# Patient Record
Sex: FEMALE | Race: WHITE | NOT HISPANIC OR LATINO | Employment: UNEMPLOYED | URBAN - METROPOLITAN AREA
[De-identification: names, ages, dates, MRNs, and addresses within clinical notes are randomized per-mention and may not be internally consistent; named-entity substitution may affect disease eponyms.]

---

## 2019-02-05 ENCOUNTER — HOSPITAL ENCOUNTER (EMERGENCY)
Facility: HOSPITAL | Age: 44
Discharge: HOME | End: 2019-02-05
Attending: EMERGENCY MEDICINE
Payer: COMMERCIAL

## 2019-02-05 VITALS
DIASTOLIC BLOOD PRESSURE: 70 MMHG | SYSTOLIC BLOOD PRESSURE: 122 MMHG | RESPIRATION RATE: 18 BRPM | OXYGEN SATURATION: 95 % | TEMPERATURE: 99.3 F | HEART RATE: 92 BPM

## 2019-02-05 DIAGNOSIS — Z76.0 ENCOUNTER FOR MEDICATION REFILL: ICD-10-CM

## 2019-02-05 DIAGNOSIS — L02.419 AXILLARY ABSCESS: Primary | ICD-10-CM

## 2019-02-05 PROCEDURE — 99282 EMERGENCY DEPT VISIT SF MDM: CPT

## 2019-02-05 RX ORDER — TRAZODONE HYDROCHLORIDE 50 MG/1
50 TABLET ORAL
Qty: 14 TABLET | Refills: 0 | Status: SHIPPED | OUTPATIENT
Start: 2019-02-05 | End: 2019-03-08 | Stop reason: HOSPADM

## 2019-02-05 RX ORDER — SULFAMETHOXAZOLE AND TRIMETHOPRIM 800; 160 MG/1; MG/1
1 TABLET ORAL 2 TIMES DAILY
Qty: 14 TABLET | Refills: 0 | Status: SHIPPED | OUTPATIENT
Start: 2019-02-05 | End: 2019-02-12

## 2019-02-05 RX ORDER — GABAPENTIN 300 MG/1
300 CAPSULE ORAL 3 TIMES DAILY
Qty: 42 CAPSULE | Refills: 0 | Status: SHIPPED | OUTPATIENT
Start: 2019-02-05 | End: 2019-03-08 | Stop reason: HOSPADM

## 2019-02-05 RX ORDER — CEPHALEXIN 500 MG/1
500 CAPSULE ORAL 4 TIMES DAILY
Qty: 28 CAPSULE | Refills: 0 | Status: SHIPPED | OUTPATIENT
Start: 2019-02-05 | End: 2019-02-12

## 2019-02-05 RX ORDER — ARIPIPRAZOLE 5 MG/1
5 TABLET ORAL 2 TIMES DAILY
Qty: 28 TABLET | Refills: 0 | Status: SHIPPED | OUTPATIENT
Start: 2019-02-05 | End: 2019-02-19

## 2019-02-05 NOTE — ED PROVIDER NOTES
History  Chief Complaint   Patient presents with    Abscess     Pt states that she has abscesses on the right underarm  Pt is concerned for staff     44year old female presenting for evaluation of multiple lumps in the right axilla  Patient states that she 1st noticed a small red bump in the right axilla 4 days ago  She states that she thought that she had an ingrown hair but then noticed to more red lumps appear over next 2 days  She reports she has been using warm compresses with only minimal relief  She states that she has had similar bumps that turned into abscesses on the previously  She denies any fevers chills or sweats  She denies any drainage of the abscesses  No sob, cough or wheezing  In addition patient recently moved here from [place] and is requesting medication refill  Patient denies any SI HI auditory visual hallucinations  None       Past Medical History:   Diagnosis Date    Psychiatric disorder     anxiety, depression, PTSD       History reviewed  No pertinent surgical history  History reviewed  No pertinent family history  I have reviewed and agree with the history as documented  Social History   Substance Use Topics    Smoking status: Current Every Day Smoker    Smokeless tobacco: Never Used    Alcohol use Yes      Comment: SOCIALLY         Review of Systems   All other systems reviewed and are negative  Physical Exam  Physical Exam   Constitutional: She is oriented to person, place, and time  She appears well-developed and well-nourished  No distress  HENT:   Head: Normocephalic and atraumatic  Eyes: Conjunctivae are normal    EOM grossly intact   Neck: Normal range of motion  Neck supple  No JVD present  Cardiovascular: Normal rate  Pulmonary/Chest: Effort normal        Abdominal: Soft     Musculoskeletal:   FROM, steady gait, cap refill brisk, strength and sensation grossly intact throughout   Neurological: She is alert and oriented to person, place, and time  Skin: Skin is warm and dry  Capillary refill takes less than 2 seconds  Psychiatric: She has a normal mood and affect  Her behavior is normal    Nursing note and vitals reviewed  Vital Signs  ED Triage Vitals [02/05/19 1402]   Temperature Pulse Respirations Blood Pressure SpO2   99 3 °F (37 4 °C) 92 18 122/70 95 %      Temp Source Heart Rate Source Patient Position - Orthostatic VS BP Location FiO2 (%)   Oral Monitor Sitting Right arm --      Pain Score       4           Vitals:    02/05/19 1402   BP: 122/70   Pulse: 92   Patient Position - Orthostatic VS: Sitting       Visual Acuity      ED Medications  Medications - No data to display    Diagnostic Studies  Results Reviewed     None                 No orders to display              Procedures  Procedures       Phone Contacts  ED Phone Contact    ED Course                               MDM  Number of Diagnoses or Management Options  Axillary abscess:   Encounter for medication refill:   Diagnosis management comments: 71-year-old female presenting with concern for abscess in the right axilla, patient does have 3 area is less than 0 5 cm non fluctuant, will not be completing incision and drainage at this time, will start patient on Bactrim and Keflex to cover for MRSA, in addition patient is requesting medication refill, will give her 2 weeks wet that she finds a PCP and psych as she is new to the area, otherwise patient appears well nontoxic appearing no acute distress afebrile today, follow up with PCP as needed outpatient for further management    strict return to ED precautions discussed  Pt verbalizes understanding and agrees with plan  Pt is stable for discharge    Portions of the record may have been created with voice recognition software  Occasional wrong word or "sound a like" substitutions may have occurred due to the inherent limitations of voice recognition software   Read the chart carefully and recognize, using context, where substitutions have occurred  Disposition  Final diagnoses:   Axillary abscess   Encounter for medication refill     Time reflects when diagnosis was documented in both MDM as applicable and the Disposition within this note     Time User Action Codes Description Comment    2/5/2019  2:21 PM Ainsley Espinal Add [L02 419] Axillary abscess     2/5/2019  2:21 PM Ainsley Espinal Add [Z76 0] Encounter for medication refill       ED Disposition     ED Disposition Condition Date/Time Comment    Discharge  Tue Feb 5, 2019  2:22 PM Pablo Cunningham discharge to home/self care  Condition at discharge: Good        Follow-up Information     Follow up With Specialties Details Why Contact Info Additional [address] Family Medicine Schedule an appointment as soon as possible for a visit As needed [address]  [facility] [address] [id number]  [address], [address] [facility],[address] [place], [place], [address]          Patient's Medications   Discharge Prescriptions    ARIPIPRAZOLE (ABILIFY) 5 MG TABLET    Take 1 tablet (5 mg total) by mouth 2 (two) times a day for 14 days       Start Date: 2/5/2019  End Date: 2/19/2019       Order Dose: 5 mg       Quantity: 28 tablet    Refills: 0    CEPHALEXIN (KEFLEX) 500 MG CAPSULE    Take 1 capsule (500 mg total) by mouth 4 (four) times a day for 7 days       Start Date: 2/5/2019  End Date: 2/12/2019       Order Dose: 500 mg       Quantity: 28 capsule    Refills: 0    GABAPENTIN (NEURONTIN) 300 MG CAPSULE    Take 1 capsule (300 mg total) by mouth 3 (three) times a day for 14 days For post-herpetic neuralgia: Take 1 tablet on day 1,  Then take 2 tablets on day 2, Then take 3 tablets on day 3 and every day after that as instructed by your doctor         Start Date: 2/5/2019  End Date: 2/19/2019       Order Dose: 300 mg       Quantity: 42 capsule    Refills: 0    SULFAMETHOXAZOLE-TRIMETHOPRIM (BACTRIM DS) 800-160 MG PER TABLET    Take 1 tablet by mouth 2 (two) times a day for 7 days smx-tmp DS (BACTRIM) 800-160 mg tabs (1tab q12 D10)       Start Date: 2/5/2019  End Date: 2/12/2019       Order Dose: 1 tablet       Quantity: 14 tablet    Refills: 0    TRAZODONE (DESYREL) 50 MG TABLET    Take 1 tablet (50 mg total) by mouth daily at bedtime for 14 days       Start Date: 2/5/2019  End Date: 2/19/2019       Order Dose: 50 mg       Quantity: 14 tablet    Refills: 0     No discharge procedures on file      ED Provider  Electronically Signed by           Migdalia Michelle PA-C  02/05/19 1814

## 2019-02-05 NOTE — DISCHARGE INSTRUCTIONS
Abscess   WHAT YOU NEED TO KNOW:   A warm compress may help your abscess drain  Your healthcare provider may make a cut in the abscess so it can drain  You may need surgery to remove an abscess that is on your hands or buttocks  DISCHARGE INSTRUCTIONS:   Return to the emergency department if:   · The area around your abscess becomes very painful, warm, or has red streaks  · You have a fever and chills  · Your heart is beating faster than usual      · You feel faint or confused  Contact your healthcare provider if:   · Your abscess gets bigger or does not get better  · Your abscess returns  · You have questions or concerns about your condition or care  Medicines: You may  need any of the following:  · Antibiotics  help treat a bacterial infection  · Acetaminophen  decreases pain and fever  It is available without a doctor's order  Ask how much to take and how often to take it  Follow directions  Acetaminophen can cause liver damage if not taken correctly  · NSAIDs , such as ibuprofen, help decrease swelling, pain, and fever  This medicine is available with or without a doctor's order  NSAIDs can cause stomach bleeding or kidney problems in certain people  If you take blood thinner medicine, always ask your healthcare provider if NSAIDs are safe for you  Always read the medicine label and follow directions  · Take your medicine as directed  Contact your healthcare provider if you think your medicine is not helping or if you have side effects  Tell him or her if you are allergic to any medicine  Keep a list of the medicines, vitamins, and herbs you take  Include the amounts, and when and why you take them  Bring the list or the pill bottles to follow-up visits  Carry your medicine list with you in case of an emergency  Self-care:   · Apply a warm compress to your abscess  This will help it open and drain  Wet a washcloth in warm, but not hot, water  Apply the compress for 10 minutes  Repeat this 4 times each day  Do not  press on an abscess or try to open it with a needle  You may push the bacteria deeper or into your blood  · Do not share your clothes, towels, or sheets with anyone  This can spread the infection to others  · Wash your hands often  This can help prevent the spread of germs  Use soap and water or an alcohol-based hand rub  Care for your wound after it is drained:   · Care for your wound as directed  If your healthcare provider says it is okay, carefully remove the bandage and gauze packing  You may need to soak the gauze to get it out of your wound  Clean your wound and the area around it as directed  Dry the area and put on new, clean bandages  Change your bandages when they get wet or dirty  · Ask your healthcare provider how to change the gauze in your wound  Keep track of how many pieces of gauze are placed inside the wound  Do not put too much packing in the wound  Do not pack the gauze too tightly in your wound  Follow up with your healthcare provider in 1 to 3 days: You may need to have your packing removed or your bandage changed  Write down your questions so you remember to ask them during your visits  © 2017 [address]  Information is for End User's use only and may not be sold, redistributed or otherwise used for commercial purposes  All illustrations and images included in CareNotes® are the copyrighted property of [employer] , [employer]  or Raudel Oliveira  The above information is an  only  It is not intended as medical advice for individual conditions or treatments  Talk to your doctor, nurse or pharmacist before following any medical regimen to see if it is safe and effective for you

## 2019-02-19 ENCOUNTER — HOSPITAL ENCOUNTER (INPATIENT)
Facility: HOSPITAL | Age: 44
LOS: 17 days | Discharge: HOME | DRG: 885 | End: 2019-03-08
Attending: HOSPITALIST | Admitting: HOSPITALIST
Payer: COMMERCIAL

## 2019-02-19 ENCOUNTER — HOSPITAL ENCOUNTER (EMERGENCY)
Facility: HOSPITAL | Age: 44
End: 2019-02-19
Attending: EMERGENCY MEDICINE
Payer: COMMERCIAL

## 2019-02-19 VITALS
WEIGHT: 170 LBS | RESPIRATION RATE: 18 BRPM | SYSTOLIC BLOOD PRESSURE: 115 MMHG | BODY MASS INDEX: 31.28 KG/M2 | DIASTOLIC BLOOD PRESSURE: 69 MMHG | OXYGEN SATURATION: 97 % | HEIGHT: 62 IN | TEMPERATURE: 97.9 F | HEART RATE: 75 BPM

## 2019-02-19 DIAGNOSIS — Z00.00 PHYSICAL EXAM: ICD-10-CM

## 2019-02-19 DIAGNOSIS — R45.851 SUICIDAL IDEATIONS: ICD-10-CM

## 2019-02-19 DIAGNOSIS — F31.81 BIPOLAR II DISORDER (HCC): Primary | ICD-10-CM

## 2019-02-19 DIAGNOSIS — Z00.00 PHYSICAL EXAM: Primary | ICD-10-CM

## 2019-02-19 LAB
ALBUMIN SERPL BCP-MCNC: 4.3 G/DL (ref 3.5–5.7)
ALP SERPL-CCNC: 85 U/L (ref 40–150)
ALT SERPL W P-5'-P-CCNC: 11 U/L (ref 7–52)
AMPHETAMINES SERPL QL SCN: NEGATIVE
ANION GAP SERPL CALCULATED.3IONS-SCNC: 10 MMOL/L (ref 4–13)
AST SERPL W P-5'-P-CCNC: 11 U/L (ref 13–39)
BARBITURATES UR QL: NEGATIVE
BASOPHILS # BLD AUTO: 0.1 THOUSANDS/ΜL (ref 0–0.1)
BASOPHILS NFR BLD AUTO: 1 % (ref 0–1)
BENZODIAZ UR QL: NEGATIVE
BILIRUB SERPL-MCNC: 0.2 MG/DL (ref 0.2–1)
BUN SERPL-MCNC: 13 MG/DL (ref 7–25)
CALCIUM SERPL-MCNC: 9.3 MG/DL (ref 8.6–10.5)
CHLORIDE SERPL-SCNC: 104 MMOL/L (ref 98–107)
CO2 SERPL-SCNC: 23 MMOL/L (ref 21–31)
COCAINE UR QL: NEGATIVE
CREAT SERPL-MCNC: 0.71 MG/DL (ref 0.6–1.2)
EOSINOPHIL # BLD AUTO: 0.4 THOUSAND/ΜL (ref 0–0.61)
EOSINOPHIL NFR BLD AUTO: 5 % (ref 0–6)
ERYTHROCYTE [DISTWIDTH] IN BLOOD BY AUTOMATED COUNT: 14.1 % (ref 11.6–15.1)
ETHANOL SERPL-MCNC: <10 MG/DL
EXT PREG TEST URINE: NEGATIVE
GFR SERPL CREATININE-BSD FRML MDRD: 105 ML/MIN/1.73SQ M
GLUCOSE SERPL-MCNC: 125 MG/DL (ref 65–140)
HCT VFR BLD AUTO: 40.9 % (ref 37–47)
HGB BLD-MCNC: 13.7 G/DL (ref 11.5–15.4)
LYMPHOCYTES # BLD AUTO: 3 THOUSANDS/ΜL (ref 0.6–4.47)
LYMPHOCYTES NFR BLD AUTO: 32 % (ref 14–44)
MCH RBC QN AUTO: 30.9 PG (ref 26.8–34.3)
MCHC RBC AUTO-ENTMCNC: 33.6 G/DL (ref 31.4–37.4)
MCV RBC AUTO: 92 FL (ref 82–98)
METHADONE UR QL: NEGATIVE
MONOCYTES # BLD AUTO: 0.6 THOUSAND/ΜL (ref 0.17–1.22)
MONOCYTES NFR BLD AUTO: 6 % (ref 4–12)
NEUTROPHILS # BLD AUTO: 5.3 THOUSANDS/ΜL (ref 1.85–7.62)
NEUTS SEG NFR BLD AUTO: 56 % (ref 43–75)
NRBC BLD AUTO-RTO: 0 /100 WBCS
OPIATES UR QL SCN: NEGATIVE
PCP UR QL: NEGATIVE
PLATELET # BLD AUTO: 373 THOUSANDS/UL (ref 149–390)
PMV BLD AUTO: 7.2 FL (ref 8.9–12.7)
POTASSIUM SERPL-SCNC: 4.3 MMOL/L (ref 3.5–5.5)
PROT SERPL-MCNC: 6.9 G/DL (ref 6.4–8.9)
RBC # BLD AUTO: 4.44 MILLION/UL (ref 3.81–5.12)
SODIUM SERPL-SCNC: 137 MMOL/L (ref 134–143)
THC UR QL: NEGATIVE
TSH SERPL DL<=0.05 MIU/L-ACNC: 3.23 UIU/ML (ref 0.45–5.33)
WBC # BLD AUTO: 9.3 THOUSAND/UL (ref 4.31–10.16)

## 2019-02-19 PROCEDURE — 80320 DRUG SCREEN QUANTALCOHOLS: CPT | Performed by: EMERGENCY MEDICINE

## 2019-02-19 PROCEDURE — 85025 COMPLETE CBC W/AUTO DIFF WBC: CPT | Performed by: EMERGENCY MEDICINE

## 2019-02-19 PROCEDURE — 80053 COMPREHEN METABOLIC PANEL: CPT | Performed by: EMERGENCY MEDICINE

## 2019-02-19 PROCEDURE — 99285 EMERGENCY DEPT VISIT HI MDM: CPT

## 2019-02-19 PROCEDURE — 36415 COLL VENOUS BLD VENIPUNCTURE: CPT | Performed by: EMERGENCY MEDICINE

## 2019-02-19 PROCEDURE — 80307 DRUG TEST PRSMV CHEM ANLYZR: CPT | Performed by: EMERGENCY MEDICINE

## 2019-02-19 PROCEDURE — 84443 ASSAY THYROID STIM HORMONE: CPT | Performed by: EMERGENCY MEDICINE

## 2019-02-19 PROCEDURE — 81025 URINE PREGNANCY TEST: CPT | Performed by: EMERGENCY MEDICINE

## 2019-02-19 RX ORDER — HALOPERIDOL 5 MG
5 TABLET ORAL EVERY 6 HOURS PRN
Status: DISCONTINUED | OUTPATIENT
Start: 2019-02-19 | End: 2019-03-08 | Stop reason: HOSPADM

## 2019-02-19 RX ORDER — ACETAMINOPHEN 325 MG/1
650 TABLET ORAL ONCE
Status: COMPLETED | OUTPATIENT
Start: 2019-02-19 | End: 2019-02-19

## 2019-02-19 RX ORDER — NICOTINE 21 MG/24HR
1 PATCH, TRANSDERMAL 24 HOURS TRANSDERMAL DAILY
Status: CANCELLED | OUTPATIENT
Start: 2019-02-19

## 2019-02-19 RX ORDER — LORAZEPAM 2 MG/ML
2 INJECTION INTRAMUSCULAR EVERY 6 HOURS PRN
Status: CANCELLED | OUTPATIENT
Start: 2019-02-19

## 2019-02-19 RX ORDER — ARIPIPRAZOLE 10 MG/1
10 TABLET ORAL DAILY
COMMUNITY
End: 2019-03-08 | Stop reason: HOSPADM

## 2019-02-19 RX ORDER — ACETAMINOPHEN 325 MG/1
975 TABLET ORAL EVERY 6 HOURS PRN
Status: CANCELLED | OUTPATIENT
Start: 2019-02-19

## 2019-02-19 RX ORDER — LORAZEPAM 2 MG/ML
2 INJECTION INTRAMUSCULAR EVERY 6 HOURS PRN
Status: DISCONTINUED | OUTPATIENT
Start: 2019-02-19 | End: 2019-02-25

## 2019-02-19 RX ORDER — ACETAMINOPHEN 325 MG/1
975 TABLET ORAL EVERY 6 HOURS PRN
Status: DISCONTINUED | OUTPATIENT
Start: 2019-02-19 | End: 2019-03-08 | Stop reason: HOSPADM

## 2019-02-19 RX ORDER — HYDROXYZINE HYDROCHLORIDE 25 MG/1
50 TABLET, FILM COATED ORAL EVERY 6 HOURS PRN
Status: CANCELLED | OUTPATIENT
Start: 2019-02-19

## 2019-02-19 RX ORDER — BENZTROPINE MESYLATE 1 MG/ML
1 INJECTION INTRAMUSCULAR; INTRAVENOUS EVERY 6 HOURS PRN
Status: DISCONTINUED | OUTPATIENT
Start: 2019-02-19 | End: 2019-02-25

## 2019-02-19 RX ORDER — NICOTINE 21 MG/24HR
1 PATCH, TRANSDERMAL 24 HOURS TRANSDERMAL DAILY
Status: DISCONTINUED | OUTPATIENT
Start: 2019-02-20 | End: 2019-02-26

## 2019-02-19 RX ORDER — RISPERIDONE 1 MG/1
1 TABLET, ORALLY DISINTEGRATING ORAL
Status: DISCONTINUED | OUTPATIENT
Start: 2019-02-19 | End: 2019-03-08 | Stop reason: HOSPADM

## 2019-02-19 RX ORDER — BENZTROPINE MESYLATE 1 MG/ML
1 INJECTION INTRAMUSCULAR; INTRAVENOUS EVERY 6 HOURS PRN
Status: CANCELLED | OUTPATIENT
Start: 2019-02-19

## 2019-02-19 RX ORDER — HALOPERIDOL 5 MG/ML
5 INJECTION INTRAMUSCULAR EVERY 6 HOURS PRN
Status: DISCONTINUED | OUTPATIENT
Start: 2019-02-19 | End: 2019-02-25

## 2019-02-19 RX ORDER — RISPERIDONE 0.5 MG/1
1 TABLET, ORALLY DISINTEGRATING ORAL
Status: CANCELLED | OUTPATIENT
Start: 2019-02-19

## 2019-02-19 RX ORDER — ACETAMINOPHEN 325 MG/1
650 TABLET ORAL EVERY 4 HOURS PRN
Status: CANCELLED | OUTPATIENT
Start: 2019-02-19

## 2019-02-19 RX ORDER — HYDROXYZINE HYDROCHLORIDE 25 MG/1
50 TABLET, FILM COATED ORAL EVERY 6 HOURS PRN
Status: DISCONTINUED | OUTPATIENT
Start: 2019-02-19 | End: 2019-02-25

## 2019-02-19 RX ORDER — BENZTROPINE MESYLATE 1 MG/1
1 TABLET ORAL EVERY 6 HOURS PRN
Status: DISCONTINUED | OUTPATIENT
Start: 2019-02-19 | End: 2019-03-08 | Stop reason: HOSPADM

## 2019-02-19 RX ORDER — ACETAMINOPHEN 325 MG/1
650 TABLET ORAL EVERY 4 HOURS PRN
Status: DISCONTINUED | OUTPATIENT
Start: 2019-02-19 | End: 2019-03-08 | Stop reason: HOSPADM

## 2019-02-19 RX ORDER — TRAZODONE HYDROCHLORIDE 50 MG/1
50 TABLET ORAL
Status: CANCELLED | OUTPATIENT
Start: 2019-02-19

## 2019-02-19 RX ORDER — TRAZODONE HYDROCHLORIDE 50 MG/1
50 TABLET ORAL
Status: DISCONTINUED | OUTPATIENT
Start: 2019-02-19 | End: 2019-02-20

## 2019-02-19 RX ORDER — MAGNESIUM HYDROXIDE/ALUMINUM HYDROXICE/SIMETHICONE 120; 1200; 1200 MG/30ML; MG/30ML; MG/30ML
30 SUSPENSION ORAL EVERY 4 HOURS PRN
Status: CANCELLED | OUTPATIENT
Start: 2019-02-19

## 2019-02-19 RX ORDER — HALOPERIDOL 5 MG/ML
5 INJECTION INTRAMUSCULAR EVERY 6 HOURS PRN
Status: CANCELLED | OUTPATIENT
Start: 2019-02-19

## 2019-02-19 RX ORDER — BENZTROPINE MESYLATE 0.5 MG/1
1 TABLET ORAL EVERY 6 HOURS PRN
Status: CANCELLED | OUTPATIENT
Start: 2019-02-19

## 2019-02-19 RX ORDER — ACETAMINOPHEN 325 MG/1
650 TABLET ORAL EVERY 6 HOURS PRN
Status: DISCONTINUED | OUTPATIENT
Start: 2019-02-19 | End: 2019-03-08 | Stop reason: HOSPADM

## 2019-02-19 RX ORDER — HALOPERIDOL 5 MG
5 TABLET ORAL EVERY 6 HOURS PRN
Status: CANCELLED | OUTPATIENT
Start: 2019-02-19

## 2019-02-19 RX ORDER — ACETAMINOPHEN 325 MG/1
650 TABLET ORAL EVERY 6 HOURS PRN
Status: CANCELLED | OUTPATIENT
Start: 2019-02-19

## 2019-02-19 RX ORDER — MAGNESIUM HYDROXIDE/ALUMINUM HYDROXICE/SIMETHICONE 120; 1200; 1200 MG/30ML; MG/30ML; MG/30ML
30 SUSPENSION ORAL EVERY 4 HOURS PRN
Status: DISCONTINUED | OUTPATIENT
Start: 2019-02-19 | End: 2019-03-08 | Stop reason: HOSPADM

## 2019-02-19 RX ADMIN — RISPERIDONE 1 MG: 1 TABLET, ORALLY DISINTEGRATING ORAL at 20:12

## 2019-02-19 RX ADMIN — ACETAMINOPHEN 650 MG: 325 TABLET ORAL at 14:16

## 2019-02-19 RX ADMIN — HYDROXYZINE HYDROCHLORIDE 50 MG: 25 TABLET ORAL at 20:12

## 2019-02-19 NOTE — ED NOTES
Call placed to [facility] at [phone]  Patient is currently fee for service    Patient believes that she had insurance, call placed to [facility]  Spoke with Prasanna Blum who stated patient insurance termed with [facility] on Jan 31, 2019 and will be active with [address] [place] [address] on March 1, 2019

## 2019-02-19 NOTE — ED PROVIDER NOTES
History  Chief Complaint   Patient presents with    Psychiatric Evaluation     Patient reports SI with no plan, has been about 1 week but worse with SI thoughts in last 3 days  Patient 71-year-old female with a psychiatric history was been off her medications for approximately 2 weeks  Patient reports that since she has been off her medication she has been feeling suicidal   Patient reports having thoughts of suicide but no plan  Patient states she does not feel she would go through with a suicide attempt but it concerned her that these thoughts keep coming in her mind  She denies any difficulty sleeping or eating  Patient denies any alcohol or drug use  Prior to Admission Medications   Prescriptions Last Dose Informant Patient Reported? Taking? ARIPiprazole (ABILIFY) 10 mg tablet   Yes Yes   Sig: Take 10 mg by mouth daily   DULoxetine HCl (CYMBALTA PO)   Yes Yes   Sig: Take by mouth daily Patient unsure of dosage  gabapentin (NEURONTIN) 300 mg capsule   No Yes   Sig: Take 1 capsule (300 mg total) by mouth 3 (three) times a day for 14 days For post-herpetic neuralgia: Take 1 tablet on day 1,  Then take 2 tablets on day 2, Then take 3 tablets on day 3 and every day after that as instructed by your doctor  traZODone (DESYREL) 50 mg tablet   No Yes   Sig: Take 1 tablet (50 mg total) by mouth daily at bedtime for 14 days      Facility-Administered Medications: None       Past Medical History:   Diagnosis Date    Anxiety     Major depressive disorder     Psychiatric disorder     anxiety, depression, PTSD    PTSD (post-traumatic stress disorder)        History reviewed  No pertinent surgical history  History reviewed  No pertinent family history  I have reviewed and agree with the history as documented      Social History     Tobacco Use    Smoking status: Current Every Day Smoker     Packs/day: 0 50     Types: Cigarettes    Smokeless tobacco: Never Used   Substance Use Topics    Alcohol use: Yes     Comment: SOCIALLY     Drug use: No        Review of Systems   Constitutional: Negative  HENT: Negative  Respiratory: Negative  Cardiovascular: Negative  Gastrointestinal: Negative  Musculoskeletal: Negative  Skin: Negative  Psychiatric/Behavioral: Positive for dysphoric mood and suicidal ideas  Negative for behavioral problems, confusion and hallucinations  All other systems reviewed and are negative  Physical Exam  Physical Exam   Constitutional: She is oriented to person, place, and time  She appears well-developed and well-nourished  HENT:   Head: Normocephalic and atraumatic  Eyes: Pupils are equal, round, and reactive to light  EOM are normal    Cardiovascular: Normal rate and regular rhythm  Pulmonary/Chest: Effort normal and breath sounds normal    Neurological: She is alert and oriented to person, place, and time  Skin: Skin is dry  Capillary refill takes less than 2 seconds  Psychiatric: She has a normal mood and affect  Her behavior is normal    Nursing note and vitals reviewed        Vital Signs  ED Triage Vitals [02/19/19 1037]   Temperature Pulse Respirations Blood Pressure SpO2   (!) 97 1 °F (36 2 °C) 90 18 118/61 98 %      Temp Source Heart Rate Source Patient Position - Orthostatic VS BP Location FiO2 (%)   Tympanic Monitor Sitting Left arm --      Pain Score       No Pain           Vitals:    02/19/19 1037   BP: 118/61   Pulse: 90   Patient Position - Orthostatic VS: Sitting       Visual Acuity      ED Medications  Medications - No data to display    Diagnostic Studies  Results Reviewed     Procedure Component Value Units Date/Time    TSH [[id number]]  (Normal) Collected:  02/19/19 1047    Lab Status:  Final result Specimen:  Blood from Arm, Left Updated:  02/19/19 1154     TSH 3RD GENERATON 3 230 uIU/mL     Narrative:       Patients undergoing fluorescein dye angiography may retain small amounts of fluorescein in the body for 48-72 hours post procedure  Samples containing fluorescein can produce falsely depressed TSH values  If the patient had this procedure,a specimen should be resubmitted post fluorescein clearance  Comprehensive metabolic panel [[id number]]  (Abnormal) Collected:  02/19/19 1047    Lab Status:  Final result Specimen:  Blood from Arm, Left Updated:  02/19/19 1139     Sodium 137 mmol/L      Potassium 4 3 mmol/L      Chloride 104 mmol/L      CO2 23 mmol/L      ANION GAP 10 mmol/L      BUN 13 mg/dL      Creatinine 0 71 mg/dL      Glucose 125 mg/dL      Calcium 9 3 mg/dL      AST 11 U/L      ALT 11 U/L      Alkaline Phosphatase 85 U/L      Total Protein 6 9 g/dL      Albumin 4 3 g/dL      Total Bilirubin 0 20 mg/dL      eGFR 105 ml/min/1 73sq m     Narrative:       National Kidney Disease Education Program recommendations are as follows:  GFR calculation is accurate only with a steady state creatinine  Chronic Kidney disease less than 60 ml/min/1 73 sq  meters  Kidney failure less than 15 ml/min/1 73 sq  meters  Ethanol [[id number]]  (Normal) Collected:  02/19/19 1047    Lab Status:  Final result Specimen:  Blood from Arm, Left Updated:  02/19/19 1139     Ethanol Lvl <10 mg/dL     Rapid drug screen, urine [[id number]]  (Normal) Collected:  02/19/19 1100    Lab Status:  Final result Specimen:  Urine, Clean Catch Updated:  02/19/19 1123     Amph/Meth UR Negative     Barbiturate Ur Negative     Benzodiazepine Urine Negative     Cocaine Urine Negative     Methadone Urine Negative     Opiate Urine Negative     PCP Ur Negative     THC Urine Negative    Narrative:       FOR MEDICAL PURPOSES ONLY  IF CONFIRMATION NEEDED PLEASE CONTACT THE LAB WITHIN 5 DAYS    Drug Screen Cutoff Levels:  AMPHETAMINE/METHAMPHETAMINES  1000 ng/mL  BARBITURATES     200 ng/mL  BENZODIAZEPINES     200 ng/mL  COCAINE      300 ng/mL  METHADONE      300 ng/mL  OPIATES      300 ng/mL  PHENCYCLIDINE     25 ng/mL  THC       50 ng/mL    CBC and differential [[id number]] (Abnormal) Collected:  02/19/19 1047    Lab Status:  Final result Specimen:  Blood from Arm, Left Updated:  02/19/19 1109     WBC 9 30 Thousand/uL      RBC 4 44 Million/uL      Hemoglobin 13 7 g/dL      Hematocrit 40 9 %      MCV 92 fL      MCH 30 9 pg      MCHC 33 6 g/dL      RDW 14 1 %      MPV 7 2 fL      Platelets 688 Thousands/uL      nRBC 0 /100 WBCs      Neutrophils Relative 56 %      Lymphocytes Relative 32 %      Monocytes Relative 6 %      Eosinophils Relative 5 %      Basophils Relative 1 %      Neutrophils Absolute 5 30 Thousands/µL      Lymphocytes Absolute 3 00 Thousands/µL      Monocytes Absolute 0 60 Thousand/µL      Eosinophils Absolute 0 40 Thousand/µL      Basophils Absolute 0 10 Thousands/µL     POCT pregnancy, urine [[id number]]  (Normal) Resulted:  02/19/19 1100    Lab Status:  Final result Updated:  02/19/19 1100     EXT PREG TEST UR (Ref: Negative) negative                 No orders to display              Procedures  Procedures       Phone Contacts  ED Phone Contact    ED Course      patient is medically cleared for psychiatric evaluation and inpatient psychiatric treatment                          MDM    Disposition  Final diagnoses:   None     ED Disposition     None      MD Documentation      Most Recent Value   Sending MD Dr Mariela Mccann    None         Patient's Medications   Discharge Prescriptions    No medications on file     No discharge procedures on file      ED Provider  Electronically Signed by           Yasmin Lima MD  02/19/19 1635

## 2019-02-19 NOTE — ED NOTES
Pt states that she is having discomfort in her chest but she does not want to refer to it as chest pains  Nurse aware at this time       Latasha Merritt  02/19/19 2764

## 2019-02-19 NOTE — LETTER
[address]  [address] [place] [address]  [phone]  Dept: [address]  CONSENT    NAME Aubry Phoenix                                         1975                              MRN [id number]    I have been informed of my rights regarding examination, treatment, and transfer   by Dr Gui Jaramillo MD    Benefits: Specialized equipment and/or services available at the receiving facility (Include comment)________________________    Risks: Potential for delay in receiving treatment      Transfer Request   I acknowledge that my medical condition has been evaluated and explained to me by the emergency department physician or other qualified medical person and/or my attending physician who has recommended and offered to me further medical examination and treatment  I understand the Hospital's obligation with respect to the treatment and stabilization of my emergency medical condition  I nevertheless request to be transferred  I release the Hospital, the doctor, and any other persons caring for me from all responsibility or liability for any injury or ill effects that may result from my transfer and agree to accept all responsibility for the consequences of my choice to transfer, rather than receive stabilizing treatment at the Hospital  I understand that because the transfer is my request, my insurance may not provide reimbursement for the services  The Hospital will assist and direct me and my family in how to make arrangements for transfer, but the hospital is not liable for any fees charged by the transport service  In spite of this understanding, I refuse to consent to further medical examination and treatment which has been offered to me, and request transfer to [address] [place] [address] Name, [facility] : Jesse Steele   I authorize the performance of emergency medical procedures and treatments upon me in both transit and upon arrival at the receiving facility  Additionally, I authorize the release of any and all medical records to the receiving facility and request they be transported with me, if possible  I authorize the performance of emergency medical procedures and treatments upon me in both transit and upon arrival at the receiving facility  Additionally, I authorize the release of any and all medical records to the receiving facility and request they be transported with me, if possible  I understand that the safest mode of transportation during a medical emergency is an ambulance and that the Hospital advocates the use of this mode of transport  Risks of traveling to the receiving facility by car, including absence of medical control, life sustaining equipment, such as oxygen, and medical personnel has been explained to me and I fully understand them  (JASVIR CORRECT BOX BELOW)  [  ]  I consent to the stated transfer and to be transported by ambulance/helicopter  [  ]  I consent to the stated transfer, but refuse transportation by ambulance and accept full responsibility for my transportation by car  I understand the risks of non-ambulance transfers and I exonerate the Hospital and its staff from any deterioration in my condition that results from this refusal     X___________________________________________    DATE  19  TIME________  Signature of patient or legally responsible individual signing on patient behalf           RELATIONSHIP TO PATIENT_________________________          Provider Certification    NAME Yaya Ann                                        [facility] 1975                              MRN [id number]    A medical screening exam was performed on the above named patient    Based on the examination:    Condition Necessitating Transfer The encounter diagnosis was Physical exam     Patient Condition: The patient has been stabilized such that within reasonable medical probability, no material deterioration of the patient condition or the condition of the unborn child(mariana) is likely to result from the transfer    Reason for Transfer: Level of Care needed not available at this facility    Transfer Requirements: [address]   · Space available and qualified personnel available for treatment as acknowledged by Thomas Olea MA  · Agreed to accept transfer and to provide appropriate medical treatment as acknowledged by       Tita Whitman  · Appropriate medical records of the examination and treatment of the patient are provided at the time of transfer   [address] [facility], [address] _______  · Transfer will be performed by qualified personnel from [address] [place] [address]   and appropriate transfer equipment as required, including the use of necessary and appropriate life support measures  Provider Certification: I have examined the patient and explained the following risks and benefits of being transferred/refusing transfer to the patient/family:  General risk, such as traffic hazards, adverse weather conditions, rough terrain or turbulence, possible failure of equipment (including vehicle or aircraft), or consequences of actions of persons outside the control of the transport personnel      Based on these reasonable risks and benefits to the patient and/or the unborn child(mariana), and based upon the information available at the time of the patients examination, I certify that the medical benefits reasonably to be expected from the provision of appropriate medical treatments at another medical facility outweigh the increasing risks, if any, to the individuals medical condition, and in the case of labor to the unborn child, from effecting the transfer      X____________________________________________ DATE 02/19/19        TIME_______      ORIGINAL - SEND TO MEDICAL RECORDS   COPY - SEND WITH PATIENT DURING TRANSFER

## 2019-02-19 NOTE — ED NOTES
Patient is accepted at [facility]  Patient is accepted by Biju thomason [facility] - SANTIAGO ASHER  [facility]          Nurse report is to be called to [phone] prior to patient transfer

## 2019-02-19 NOTE — EMTALA/ACUTE CARE TRANSFER
[facility] 1076  [address] [place] [address]  [phone]  Dept: [address] [place] [address] CONSENT    NAME Lesley Lucero                                         1975                              MRN [id number]    I have been informed of my rights regarding examination, treatment, and transfer   by Dr Jeff Graham MD    Benefits: Specialized equipment and/or services available at the receiving facility (Include comment)________________________specialized inpatient care    Risks: Potential for delay in receiving treatment  Transport related    Transfer Request   I acknowledge that my medical condition has been evaluated and explained to me by the emergency department physician or other qualified medical person and/or my attending physician who has recommended and offered to me further medical examination and treatment  I understand the Hospital's obligation with respect to the treatment and stabilization of my emergency medical condition  I nevertheless request to be transferred  I release the Hospital, the doctor, and any other persons caring for me from all responsibility or liability for any injury or ill effects that may result from my transfer and agree to accept all responsibility for the consequences of my choice to transfer, rather than receive stabilizing treatment at the Hospital  I understand that because the transfer is my request, my insurance may not provide reimbursement for the services  The Hospital will assist and direct me and my family in how to make arrangements for transfer, but the hospital is not liable for any fees charged by the transport service  In spite of this understanding, I refuse to consent to further medical examination and treatment which has been offered to me, and request transfer to [address] [place] [address] Name, [facility] : Prudence Nobles   I authorize the performance of emergency medical procedures and treatments upon me in both transit and upon arrival at the receiving facility  Additionally, I authorize the release of any and all medical records to the receiving facility and request they be transported with me, if possible  I authorize the performance of emergency medical procedures and treatments upon me in both transit and upon arrival at the receiving facility  Additionally, I authorize the release of any and all medical records to the receiving facility and request they be transported with me, if possible  I understand that the safest mode of transportation during a medical emergency is an ambulance and that the Hospital advocates the use of this mode of transport  Risks of traveling to the receiving facility by car, including absence of medical control, life sustaining equipment, such as oxygen, and medical personnel has been explained to me and I fully understand them  (JASVIR CORRECT BOX BELOW)  [  ]  I consent to the stated transfer and to be transported by ambulance/helicopter  [  ]  I consent to the stated transfer, but refuse transportation by ambulance and accept full responsibility for my transportation by car  I understand the risks of non-ambulance transfers and I exonerate the Hospital and its staff from any deterioration in my condition that results from this refusal     X___________________________________________    DATE  19  TIME________  Signature of patient or legally responsible individual signing on patient behalf           RELATIONSHIP TO PATIENT_________________________          Provider Certification    NAME Ted Salas                                         1975                              MRN [id number]    A medical screening exam was performed on the above named patient    Based on the examination:    Condition Necessitating Transfer The primary encounter diagnosis was Physical exam  A diagnosis of Suicidal ideations was also pertinent to this visit  Patient Condition: The patient has been stabilized such that within reasonable medical probability, no material deterioration of the patient condition or the condition of the unborn child(mariana) is likely to result from the transfer    Reason for Transfer: Level of Care needed not available at this facility    Transfer Requirements: [address]   · Space available and qualified personnel available for treatment as acknowledged by Paula Allen MA  · Agreed to accept transfer and to provide appropriate medical treatment as acknowledged by       Barbi Howell  · Appropriate medical records of the examination and treatment of the patient are provided at the time of transfer   [address] [facility], [address] _______  · Transfer will be performed by qualified personnel from [address] [place] [address]   and appropriate transfer equipment as required, including the use of necessary and appropriate life support measures  Provider Certification: I have examined the patient and explained the following risks and benefits of being transferred/refusing transfer to the patient/family:  General risk, such as traffic hazards, adverse weather conditions, rough terrain or turbulence, possible failure of equipment (including vehicle or aircraft), or consequences of actions of persons outside the control of the transport personnel      Based on these reasonable risks and benefits to the patient and/or the unborn child(mariana), and based upon the information available at the time of the patients examination, I certify that the medical benefits reasonably to be expected from the provision of appropriate medical treatments at another medical facility outweigh the increasing risks, if any, to the individuals medical condition, and in the case of labor to the unborn child, from effecting the transfer      X____________________________________________ DATE 02/19/19        TIME_______      ORIGINAL - SEND TO MEDICAL RECORDS   COPY - SEND WITH PATIENT DURING TRANSFER

## 2019-02-19 NOTE — ED NOTES
Transportation is arranged with [employer] is scheduled for 1800   Patient may go to the floor at 1800          Nurse report is to be called to [phone] prior to patient transfer

## 2019-02-19 NOTE — ED NOTES
Met with patient and completed the crisis intake assessment  Patient arrived private vehicle  Patient appears depressed and tearful  Patient admits to SI (worsening over the last 2 days) no plan  Disturbances with sleep and appetite( no weight loss noted) as well as lack of concentration and motivation  " I have no energy, to complete my daily activities" Recent move to area and out of medications x 3 days  Patient recently moved with friends, lacks support, no contact with her children ( ages 25 and 12 for 2 years both in foster care), father passed away in August and since her relationship with her mother has worsened  Patient signed a voluntary admission  Bed search and insurance authorization in progress

## 2019-02-20 PROBLEM — F33.2 SEVERE EPISODE OF RECURRENT MAJOR DEPRESSIVE DISORDER (HCC): Status: ACTIVE | Noted: 2019-02-20

## 2019-02-20 LAB
ATRIAL RATE: 72 BPM
CHOLEST SERPL-MCNC: 193 MG/DL (ref 0–200)
HDLC SERPL-MCNC: 28 MG/DL (ref 40–60)
LDLC SERPL CALC-MCNC: 100 MG/DL (ref 75–193)
NONHDLC SERPL-MCNC: 165 MG/DL
P AXIS: 40 DEGREES
PR INTERVAL: 142 MS
QRS AXIS: 4 DEGREES
QRSD INTERVAL: 100 MS
QT INTERVAL: 402 MS
QTC INTERVAL: 440 MS
T WAVE AXIS: 18 DEGREES
TRIGL SERPL-MCNC: 323 MG/DL (ref 44–166)
VENTRICULAR RATE: 72 BPM

## 2019-02-20 PROCEDURE — 80061 LIPID PANEL: CPT | Performed by: NURSE PRACTITIONER

## 2019-02-20 PROCEDURE — 93005 ELECTROCARDIOGRAM TRACING: CPT

## 2019-02-20 PROCEDURE — 93010 ELECTROCARDIOGRAM REPORT: CPT | Performed by: INTERNAL MEDICINE

## 2019-02-20 PROCEDURE — 86592 SYPHILIS TEST NON-TREP QUAL: CPT | Performed by: NURSE PRACTITIONER

## 2019-02-20 PROCEDURE — 99222 1ST HOSP IP/OBS MODERATE 55: CPT | Performed by: HOSPITALIST

## 2019-02-20 RX ORDER — DULOXETIN HYDROCHLORIDE 30 MG/1
30 CAPSULE, DELAYED RELEASE ORAL DAILY
Status: DISCONTINUED | OUTPATIENT
Start: 2019-02-20 | End: 2019-02-21

## 2019-02-20 RX ORDER — TRAZODONE HYDROCHLORIDE 50 MG/1
50 TABLET ORAL
Status: DISCONTINUED | OUTPATIENT
Start: 2019-02-20 | End: 2019-02-22

## 2019-02-20 RX ORDER — ARIPIPRAZOLE 2 MG/1
2 TABLET ORAL DAILY
Status: DISCONTINUED | OUTPATIENT
Start: 2019-02-20 | End: 2019-02-22

## 2019-02-20 RX ORDER — GABAPENTIN 300 MG/1
300 CAPSULE ORAL 3 TIMES DAILY
Status: DISCONTINUED | OUTPATIENT
Start: 2019-02-20 | End: 2019-02-24

## 2019-02-20 RX ADMIN — GABAPENTIN 300 MG: 300 CAPSULE ORAL at 21:00

## 2019-02-20 RX ADMIN — GABAPENTIN 300 MG: 300 CAPSULE ORAL at 16:45

## 2019-02-20 RX ADMIN — ARIPIPRAZOLE 2 MG: 2 TABLET ORAL at 09:10

## 2019-02-20 RX ADMIN — DULOXETINE HYDROCHLORIDE 30 MG: 30 CAPSULE, DELAYED RELEASE ORAL at 09:10

## 2019-02-20 RX ADMIN — RISPERIDONE 1 MG: 1 TABLET, ORALLY DISINTEGRATING ORAL at 21:00

## 2019-02-20 RX ADMIN — HYDROXYZINE HYDROCHLORIDE 50 MG: 25 TABLET ORAL at 21:00

## 2019-02-20 RX ADMIN — GABAPENTIN 300 MG: 300 CAPSULE ORAL at 09:10

## 2019-02-20 NOTE — CONSULTS
Consultation - Ministerio Cross 44 y o  female MRN: [id number]    Unit/Bed#: [facility] 251-01 Encounter: [id number]      Assessment/Plan     Assessment/Plan:  Anxiety, major depressive disorder, PTSD  - patient will be treated and evaluated by behavior health while inpatient      History of Present Illness     HPI: Ministerio Cross is a 44y o  year old female who presented to the emergency department, with suicidal ideations without a plan  Patient claims that she stopped taking her medication approximately 2 weeks ago prior to arrival  Patient admits to intermittent substernal chest pain  That has been going on for weeks  It comes on spontaneously and go away on its own as well  Patient has been attributing it to indigestion  No cardiac history but does have family history of HTN  I advised if pain returns, to follow up with PCP outpatient  If any worsening of symptoms or if in any distress, please let nursing know right away and it will be addressed while in patient  Inpatient consult for [facility]  Consult performed by: Fara Francis PA-C  Consult ordered by: SAMUEL Billings          Review of Systems   Constitutional: Negative for fever  HENT: Negative for ear discharge, sinus pressure, sinus pain and sore throat  Respiratory: Negative for cough  Gastrointestinal: Negative for abdominal pain, diarrhea, nausea and vomiting  Genitourinary: Negative for dysuria, hematuria and urgency  Historical Information   Past Medical History:   Diagnosis Date    Anxiety     Major depressive disorder     Psychiatric disorder     anxiety, depression, PTSD    PTSD (post-traumatic stress disorder)      No past surgical history on file    Social History   Social History     Substance and Sexual Activity   Alcohol Use Yes    Frequency: Monthly or less    Drinks per session: 3 or 4    Binge frequency: Less than monthly    Comment: SOCIALLY      Social History     Substance and Sexual Activity   Drug Use No     Social History     Tobacco Use   Smoking Status Current Every Day Smoker    Packs/day: 0 50    Types: Cigarettes   Smokeless Tobacco Never Used     Family History:   Family History   Problem Relation Age of Onset    No Known Problems Mother     No Known Problems Father     No Known Problems Sister     No Known Problems Brother     No Known Problems Maternal Aunt     No Known Problems Paternal Aunt     No Known Problems Maternal Uncle     No Known Problems Paternal Uncle     No Known Problems Maternal Grandfather     No Known Problems Maternal Grandmother     No Known Problems Paternal Grandfather     No Known Problems Paternal Grandmother     No Known Problems Cousin     ADD / ADHD Neg Hx     Alcohol abuse Neg Hx     Anxiety disorder Neg Hx     Bipolar disorder Neg Hx     Completed Suicide  Neg Hx     Dementia Neg Hx     Depression Neg Hx     Drug abuse Neg Hx     OCD Neg Hx     Psychiatric Illness Neg Hx     Psychosis Neg Hx     Schizoaffective Disorder  Neg Hx     Schizophrenia Neg Hx     Self-Injury Neg Hx     Suicide Attempts Neg Hx        Meds/Allergies   current meds:   Current Facility-Administered Medications   Medication Dose Route Frequency    acetaminophen (TYLENOL) tablet 650 mg  650 mg Oral Q6H PRN    acetaminophen (TYLENOL) tablet 650 mg  650 mg Oral Q4H PRN    acetaminophen (TYLENOL) tablet 975 mg  975 mg Oral Q6H PRN    aluminum-magnesium hydroxide-simethicone (MYLANTA) 200-200-20 mg/5 mL oral suspension 30 mL  30 mL Oral Q4H PRN    ARIPiprazole (ABILIFY) tablet 2 mg  2 mg Oral Daily    benztropine (COGENTIN) injection 1 mg  1 mg Intramuscular Q6H PRN    benztropine (COGENTIN) tablet 1 mg  1 mg Oral Q6H PRN    DULoxetine (CYMBALTA) delayed release capsule 30 mg  30 mg Oral Daily    gabapentin (NEURONTIN) capsule 300 mg  300 mg Oral TID    haloperidol (HALDOL) tablet 5 mg  5 mg Oral Q6H PRN    haloperidol lactate (HALDOL) injection 5 mg  5 mg Intramuscular Q6H PRN    hydrOXYzine HCL (ATARAX) tablet 50 mg  50 mg Oral Q6H PRN    LORazepam (ATIVAN) 2 mg/mL injection 2 mg  2 mg Intramuscular Q6H PRN    magnesium hydroxide (MILK OF MAGNESIA) 400 mg/5 mL oral suspension 30 mL  30 mL Oral Daily PRN    nicotine (NICODERM CQ) 21 mg/24 hr TD 24 hr patch 1 patch  1 patch Transdermal Daily    risperiDONE (RisperDAL M-TABS) dispersible tablet 1 mg  1 mg Oral Q3H PRN    traZODone (DESYREL) tablet 50 mg  50 mg Oral HS    and PTA meds:   Prior to Admission Medications   Prescriptions Last Dose Informant Patient Reported? Taking? ARIPiprazole (ABILIFY) 10 mg tablet   Yes Yes   Sig: Take 10 mg by mouth daily   DULoxetine HCl (CYMBALTA PO)   Yes Yes   Sig: Take by mouth daily Patient unsure of dosage  gabapentin (NEURONTIN) 300 mg capsule   No Yes   Sig: Take 1 capsule (300 mg total) by mouth 3 (three) times a day for 14 days For post-herpetic neuralgia: Take 1 tablet on day 1,  Then take 2 tablets on day 2, Then take 3 tablets on day 3 and every day after that as instructed by your doctor  traZODone (DESYREL) 50 mg tablet   No Yes   Sig: Take 1 tablet (50 mg total) by mouth daily at bedtime for 14 days      Facility-Administered Medications: None     No Known Allergies    Objective   Vitals: Blood pressure 90/52, pulse 66, temperature 97 8 °F (36 6 °C), temperature source Temporal, resp  rate 16, height 5' 2" (1 575 m), weight 90 3 kg (199 lb), SpO2 95 %, not currently breastfeeding  No intake or output data in the 24 hours ending 02/20/19 1010  Invasive Devices          None          Physical Exam   Constitutional: She is oriented to person, place, and time  She appears well-developed and well-nourished  No distress  HENT:   Head: Normocephalic and atraumatic  Cardiovascular: Normal rate, regular rhythm and normal heart sounds  Pulmonary/Chest: Effort normal and breath sounds normal  No respiratory distress  She has no wheezes   She has no rales  Neurological: She is alert and oriented to person, place, and time  No cranial nerve deficit or sensory deficit  She exhibits normal muscle tone  5/5 motor, nl sens   Skin: Skin is warm and dry  She is not diaphoretic  Psychiatric: She has a normal mood and affect  Her behavior is normal    Nursing note and vitals reviewed  Lab Results: I have personally reviewed pertinent reports  Imaging Studies: I have personally reviewed pertinent reports  EKG, Pathology, and Other Studies: I have personally reviewed pertinent reports  VTE Prophylaxis: Reason for no pharmacologic prophylaxis Ambulatory    Code Status: Level 1 - Full Code  Advance Directive and Living Will:      Power of :    POLST:      Counseling / Coordination of Care  Total floor / unit time spent today 30 minutes  Greater than 50% of total time was spent with the patient and / or family counseling and / or coordination of care
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
## 2019-02-20 NOTE — PROGRESS NOTES
Adjusting to unit well  Patient observed sleeping during most Q 7 minute safety checks (second portion of shift)  No suicidal ideations, acting out or homicidal behaviors  No change in medical condition or complaints voiced  Fluids maintained at bedside to promote hydration

## 2019-02-20 NOTE — PROGRESS NOTES
Patient alert ate breakfast then back to bed after eating, to read  Patient came to nursing station for ordered medications when prompted and was compliant and then attended group  She is tearful when speaking to this nurse, denies si hi and hallucinations  Patient tells this nurse reason for admission is because she is suffering from uncontrolled depression and doesn't even "want to bother anymore" she feels hopeless and helpless and has a very negative outlook on the her future  She has suffered from depression for many years and states some medications can help to keep it somewhat controlled but she never feels 100% better and she has no doctor to get her meds filled  She was also seeing a therapist but just recent;y obtained insurance so hopes to set up an appointment  Big stressors are her 2 children in foster care that will not speak with her and she has no place to live and is staying with a friend  Will maintain on safety precautions and 7 minute checks, no needs identified

## 2019-02-20 NOTE — PROGRESS NOTES
Adjusting well to unit  Atarax 50 mg and Risperdal 1 mg given at 2012 for anxiety and agitation  Effective at present  Remains on q 7 minute safety checks

## 2019-02-20 NOTE — PLAN OF CARE
PT attended the morning art therapy group where she elected to sit with peers and did engage in group directive   PT was mostly quiet until prompted, was cooperative

## 2019-02-20 NOTE — NURSING NOTE
Patient admitted to [facility], [address] [place] [address] from [address]  with a valid 201  Physical assessment completed, no wounds or weapons noted  Shower, no signs of infestation noted  Bill of Rights and HIPPA regulations reviewed  Admission medication and diet ordered  Oriented to unit  Started on Q 7 minute safety checks  Fluids and food given  Appetite fair  Personal property recorded  Affect flat  Appears depressed

## 2019-02-20 NOTE — PLAN OF CARE
Problem: Risk for Self Injury/Neglect  Goal: Treatment Goal: Remain safe during length of stay, learn and adopt new coping skills, and be free of self-injurious ideation, impulses and acts at the time of discharge  Outcome: Progressing  Goal: Verbalize thoughts and feelings  Description  Interventions:  - Assess and re-assess patient's lethality and potential for self-injury  - Engage patient in 1:1 interactions, daily, for a minimum of 15 minutes  - Encourage patient to express feelings, fears, frustrations, hopes  - Establish rapport/trust with patient   Outcome: Progressing  Goal: Refrain from harming self  Description  Interventions:  - Monitor patient closely, per order  - Develop a trusting relationship  - Supervise medication ingestion, monitor effects and side effects   Outcome: Progressing  Goal: Attend and participate in unit activities, including therapeutic, recreational, and educational groups  Description  Interventions:  - Provide therapeutic and educational activities daily, encourage attendance and participation, and document same in the medical record  - Obtain collateral information, encourage visitation and family involvement in care   Outcome: Progressing  Goal: Recognize maladaptive responses and adopt new coping mechanisms  Outcome: Progressing  Goal: Complete daily ADLs, including personal hygiene independently, as able  Description  Interventions:  - Observe, teach, and assist patient with ADLS  - Monitor and promote a balance of rest/activity, with adequate nutrition and elimination  Outcome: Progressing     Problem: Depression  Goal: Treatment Goal: Demonstrate behavioral control of depressive symptoms, verbalize feelings of improved mood/affect, and adopt new coping skills prior to discharge  Outcome: Progressing  Goal: Verbalize thoughts and feelings  Description  Interventions:  - Assess and re-assess patient's level of risk   - Engage patient in 1:1 interactions, daily, for a minimum of 15 minutes   - Encourage patient to express feelings, fears, frustrations, hopes   Outcome: Progressing  Goal: Refrain from harming self  Description  Interventions:  - Monitor patient closely, per order   - Supervise medication ingestion, monitor effects and side effects   Outcome: Progressing  Goal: Refrain from isolation  Description  Interventions:  - Develop a trusting relationship   - Encourage socialization   Outcome: Progressing  Goal: Refrain from self-neglect  Outcome: Progressing  Goal: Attend and participate in unit activities, including therapeutic, recreational, and educational groups  Description  Interventions:  - Provide therapeutic and educational activities daily, encourage attendance and participation, and document same in the medical record   Outcome: Progressing  Goal: Complete daily ADLs, including personal hygiene independently, as able  Description  Interventions:  - Observe, teach, and assist patient with ADLS  -  Monitor and promote a balance of rest/activity, with adequate nutrition and elimination   Outcome: Progressing

## 2019-02-21 LAB — RPR SER QL: NORMAL

## 2019-02-21 PROCEDURE — 99232 SBSQ HOSP IP/OBS MODERATE 35: CPT | Performed by: NURSE PRACTITIONER

## 2019-02-21 RX ORDER — DULOXETIN HYDROCHLORIDE 60 MG/1
60 CAPSULE, DELAYED RELEASE ORAL DAILY
Status: DISCONTINUED | OUTPATIENT
Start: 2019-02-22 | End: 2019-02-25

## 2019-02-21 RX ADMIN — ARIPIPRAZOLE 2 MG: 2 TABLET ORAL at 08:32

## 2019-02-21 RX ADMIN — GABAPENTIN 300 MG: 300 CAPSULE ORAL at 21:33

## 2019-02-21 RX ADMIN — GABAPENTIN 300 MG: 300 CAPSULE ORAL at 08:32

## 2019-02-21 RX ADMIN — GABAPENTIN 300 MG: 300 CAPSULE ORAL at 16:59

## 2019-02-21 RX ADMIN — HYDROXYZINE HYDROCHLORIDE 50 MG: 25 TABLET ORAL at 21:34

## 2019-02-21 RX ADMIN — RISPERIDONE 1 MG: 1 TABLET, ORALLY DISINTEGRATING ORAL at 21:34

## 2019-02-21 RX ADMIN — DULOXETINE HYDROCHLORIDE 30 MG: 30 CAPSULE, DELAYED RELEASE ORAL at 08:32

## 2019-02-21 RX ADMIN — TRAZODONE HYDROCHLORIDE 50 MG: 50 TABLET ORAL at 22:55

## 2019-02-21 NOTE — PROGRESS NOTES
Progress Note - Behavioral Health     Kathy Heredia 44 y o  female MRN: [id number]   Unit/Bed#: [facility] 251-01 Encounter: [id number]    Behavior over the last 24 hours: Isabel was seen for follow-up inpatient psychiatric visit this date  She relates she is still feeling extremely depressed and anxious and expressed that she still feels at times and she would be better off no longer living  She denies any plan  Per nursing report, she remains depressed and somewhat withdrawn  ROS: no complaints    Mental Status Evaluation:    Appearance:  disheveled   Behavior:  cooperative, calm   Speech:  slow, soft   Mood:  depressed, dysphoric, anxious   Affect:  flat   Thought Process:  coherent   Associations: intact associations   Thought Content:  ruminating thoughts   Perceptual Disturbances: none   Risk Potential: Suicidal ideation - yes; fleeting death wishes without plan  Homicidal ideation - None  Potential for aggression - No   Sensorium:  oriented to person, place and time/date   Memory:  recent and remote memory grossly intact   Consciousness:  alert and awake   Attention: decreased concentration and decreased attention span   Insight:  fair   Judgment: fair   Gait/Station: normal gait/station and normal balance   Motor Activity: no abnormal movements     Vital signs in last 24 hours:    Temp:  [98 3 °F (36 8 °C)-98 8 °F (37 1 °C)] 98 8 °F (37 1 °C)  HR:  [75-78] 78  Resp:  [16] 16  BP: ()/(48-80) 117/54    Laboratory results:  I have personally reviewed all pertinent laboratory/tests results  Progress Toward Goals: progressing    Assessment/Plan   Principal Problem:    Severe episode of recurrent major depressive disorder (HCC)    Recommended Treatment:   Will increase Cymbalta to 60 mg daily and continue other medications as prescribed  Continue to monitor patient and adjust medications as needed    Discharge disposition and planning are ongoing; however, Isabel requires continued hospitalization until her mood stabilizes due to profound depression and suicidal ideation  All current active medications have been reviewed  Encourage group therapy, milieu therapy and occupational therapy  [address] checks every 7 minutes      Current Facility-Administered Medications:  acetaminophen 650 mg Oral Q6H PRN Oksana Flynn, CRNP   acetaminophen 650 mg Oral Q4H PRN Oksana Isidroics, CRNP   acetaminophen 975 mg Oral Q6H PRN Oksana Flynn, CRNP   aluminum-magnesium hydroxide-simethicone 30 mL Oral Q4H PRN Oksana Flynn, CRNP   ARIPiprazole 2 mg Oral Daily Viktoria Pierce MD   benztropine 1 mg Intramuscular Q6H PRN Oksana Flynn, CRNP   benztropine 1 mg Oral Q6H PRN Oksana Flynn, CRNP   [START ON 2/22/2019] DULoxetine 60 mg Oral Daily Oksana Flynn, CRNP   gabapentin 300 mg Oral TID Viktoria Pierce MD   haloperidol 5 mg Oral Q6H PRN Oksana Flynn, CRNP   haloperidol lactate 5 mg Intramuscular Q6H PRN Oksana Flynn, CRNP   hydrOXYzine HCL 50 mg Oral Q6H PRN Oksana Flynn, CRNP   LORazepam 2 mg Intramuscular Q6H PRN Oksana Flynn, CRNP   magnesium hydroxide 30 mL Oral Daily PRN Oksana Flynn, CRNP   nicotine 1 patch Transdermal Daily Oksana Flynn, CRNP   risperiDONE 1 mg Oral Q3H PRN Oksana Flynn, CRNP   traZODone 50 mg Oral HS Viktoria Pierce MD       Risks / Benefits of Treatment:    Risks, benefits, and possible side effects of medications explained to patient and patient verbalizes understanding and agreement for treatment  Counseling / Coordination of Care:      Patient's progress discussed with staff in treatment team meeting  Medications, treatment progress and treatment plan reviewed with patient
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
## 2019-02-21 NOTE — PROGRESS NOTES
Patient pleasant during assessment  Anxiety has decreased tonight, however she remains depressed  Occasional suicidal ideations noted, this have decreased since admission yesterday  Withdraws to room to read book  Refused Trazodone tonight  Prefer to take Risperdal 1 mg for mild agitation and Atarax 50 mg for anxiety, both at [facility]  "They were both effective last night " Agreed to come to staff if she started feeling suicidal again or if she cannot go to sleep

## 2019-02-21 NOTE — PROGRESS NOTES
Clinical Pharmacy Note: Antipsychotic Monitoring Requirements     Marcio Todd is a 44 y o  female who presents with  depression, anxiety, unstable mood and suicidal ideation and is currently taking  aripiprazole 2 mg once daily  Performing metabolic monitoring on patients receiving any antipsychotics is a Centers for [employer] (CMS) requirement  Antipsychotic treatment increases the risk of developing type 2 diabetes mellitus, hypertension, and hyperlipidemia  According to the [facility]  [facility] (NICE) guidelines, baseline weight, waist circumference, pulse, blood pressure, fasting blood glucose, hemoglobin A1c, lipid profile, and prolactin level (if initiating risperidone or paliperidone) should be collected  These guidelines coincide with Centers and Medicare & Medicaid Services (CMS) requirements including baseline Body Mass Index, blood pressure, fasting glucose, hemoglobin A1c, and fasting lipid panel  CMS states laboratory values collected 12 months from discharge date are acceptable for evaluation  CMS Checklist:     BMI: yes  BP: yes  Fasting glucose: yes  A1c within last 12 months: no   Lipid panel within last 12 months: yes  Nicotine Replacement Therapy: yes    The following values have been collected:  Value Status Result    BMI Body mass index is 36 4 kg/m²  Blood pressure BP (!) 86/48 (BP Location: Left arm)   Pulse 75   Temp 98 6 °F (37 °C) (Temporal)   Resp 16   Ht 5' 2" (1 575 m)   Wt 90 3 kg (199 lb)   LMP  (LMP Unknown)   SpO2 95%   Breastfeeding?  No   BMI 36 40 kg/m²    Fasting glucose 0   Lab Value Date/Time    GLUC 125 02/19/2019 1047        Hemoglobin A1c No results found for: HGBA1C   Lipid panel 0   Lab Value Date/Time    CHOLESTEROL 193 02/20/2019 1313       0   Lab Value Date/Time    HDL 28 (L) 02/20/2019 1313       0   Lab Value Date/Time    LDLCALC 100 02/20/2019 1313        0   Lab Value Date/Time TRIG 323 (H) 02/20/2019 1313          ASCVD risk score: 3 6%  History of ACS, MI, stable angina, stroke, TIA, PAD, coronary/aterial revascularization:  no  LDL cholesterol =>190 mg/dL: no  Age: 44 y o  Diabetes: no  Sex: female  Race: other  HDL Cholesterol: 28  Systolic Blood Pressure: 86  Treatment for Hypertension: no  Smoker: yes  (score reflects the risk of cardiovascular events such as stroke or myocardial infarction in the next 10 years)    Recommendations    Based on the results of hemoglobin A1c, lipid panel, and blood pressure monitoring, Isabel is an potential candidate for no pharmacological interventions and routine monitoring based on  ASCVD<7 5% and BP within goal       Based on the above information, pharmacy recommends the following: Continue yearly metabolic monitoring         Pharmacy will continue to follow patient with team   Electronically signed by: Anahi Dean, Pharmacist BCPP

## 2019-02-21 NOTE — PROGRESS NOTES
Clinical Pharmacy Note: Medication Education Group     Intervention:  Open Forum    Length of Group: 45 min     Methods/resources used: verbal discussion     Topics Discussed: Medication adherence, side effect management, nonpharmacologic strategies, medication effectiveness and indications      Time spent in group: Presented late to group      Patient Specific concerns: Sunsonny presented to group today dressed in street clothing and appeared somewhat disheveled and alert and oriented to person, place and time  Sunshine seemed dysphoric and anxious  Patient's affect was mainly incongruent and she actively participated in group today  Patient was respectful of others throughout and interacted appropriately with peers  Edelstein did have specific concerns about treatments for depression  She stated concern and frustration about running out of pharmaceutical options since the team currently wants her to try a different regimen that has kept her stable for years  She stated that getting a terminal illness would actually be a relief since she would be able to live how she wanted to live (I e  Self-treating with recreational drugs)  She has a lot of anxiety about switching medications and wishes to increase her duloxetine instead  She referred to her aripiprazole as her "don't kill myself pill "  Writer discussed various options to treat depression with the patient and group, emphasizing different pharmalogical and non-pharmalogical options including bupropion, mirtazapine, antipsychotic supplementation, ECT, and [facility]  Writer also discussed how typically 60 mg is thought to be the maximum dose of duloxetine, but for some people, higher doses do provide benefit  However, informed the patient that a plateau effect is not uncommon in depression which could be due to her body adjusting to the medications or her illness worsening    Edelstein seemed to understand information, but still looked frustrated and exhausted      Patient understood counseling: yes     Electronically signed by: Anita Montiel, Pharmacist

## 2019-02-21 NOTE — TREATMENT PLAN
TREATMENT PLAN REVIEW - [address] [facility] 44 y o  1975 female MRN: [id number]    [address] Room / Bed: [address] 920-12 Encounter: [id number]          Admit Date/Time:  2/19/2019  7:19 PM    Treatment Team: Attending Provider: Elsie Stevenson MD; Patient Care Assistant: Lázaro Cueva; Registered Nurse: Trace Alvarez RN; Patient Care Assistant: Elizabeth Cook; Patient Care Assistant: Marilee Medina; Patient Care Assistant: Esau Ramírez    Diagnosis: Principal Problem:    Severe episode of recurrent major depressive disorder [facility])      Patient Strengths/Assets: good past treatment response, motivation for treatment/growth, patient is on a voluntary commitment    Patient Barriers/Limitations: homeless, lack of financial means, lack of social/family support    Short Term Goals: decrease in depressive symptoms, decrease in anxiety symptoms, sleep improvement    Long Term Goals: resolution of depressive symptoms, stable living arrangements upon discharge, resolve employment issues    Progress Towards Goals: starting psychiatric medications as prescribed    Recommended Treatment: medication management, patient medication education, group therapy, milieu therapy, continued Behavioral Health psychiatric evaluation/assessment process    Treatment Frequency: daily medication monitoring, group and milieu therapy daily, monitoring through interdisciplinary rounds, monitoring through weekly patient care conferences    Expected Discharge Date:  2/25/19    Discharge Plan: placement in alternative living arrangement, referral for outpatient medication management with a psychiatrist, referral for outpatient psychotherapy    Treatment Plan Created/Updated By: Elsie Stevenson MD

## 2019-02-21 NOTE — PLAN OF CARE
PT attended her first art therapy group this morning since the time she was admitted to the unit  PT came to group late, then elected to sit separately from peers and chose to work on a project of choice  PT was mostly quiet and withdrawn until prompted

## 2019-02-22 PROCEDURE — 99232 SBSQ HOSP IP/OBS MODERATE 35: CPT | Performed by: NURSE PRACTITIONER

## 2019-02-22 RX ORDER — ARIPIPRAZOLE 5 MG/1
5 TABLET ORAL DAILY
Status: DISCONTINUED | OUTPATIENT
Start: 2019-02-23 | End: 2019-02-23

## 2019-02-22 RX ORDER — TRAZODONE HYDROCHLORIDE 150 MG/1
150 TABLET ORAL
Status: DISCONTINUED | OUTPATIENT
Start: 2019-02-22 | End: 2019-02-26

## 2019-02-22 RX ADMIN — RISPERIDONE 1 MG: 1 TABLET, ORALLY DISINTEGRATING ORAL at 20:49

## 2019-02-22 RX ADMIN — GABAPENTIN 300 MG: 300 CAPSULE ORAL at 16:50

## 2019-02-22 RX ADMIN — GABAPENTIN 300 MG: 300 CAPSULE ORAL at 20:50

## 2019-02-22 RX ADMIN — ARIPIPRAZOLE 2 MG: 2 TABLET ORAL at 08:14

## 2019-02-22 RX ADMIN — DULOXETINE HYDROCHLORIDE 60 MG: 60 CAPSULE, DELAYED RELEASE ORAL at 08:14

## 2019-02-22 RX ADMIN — GABAPENTIN 300 MG: 300 CAPSULE ORAL at 08:14

## 2019-02-22 RX ADMIN — HYDROXYZINE HYDROCHLORIDE 50 MG: 25 TABLET ORAL at 20:49

## 2019-02-22 NOTE — PLAN OF CARE
PT attends about 50% of offered art therapy groups where she usually comes to the start of group and listens to group opening and directive  PT appears very withdrawn and guarded, with minimal social interactions until prompted  PT does seems to enjoy working with offered art materials

## 2019-02-22 NOTE — PROGRESS NOTES
Progress Note - Behavioral Health     Beulah Heart 44 y o  female MRN: [id number]   Unit/Bed#: [facility] 251-01 Encounter: [id number]    Behavior over the last 24 hours:  Isabel was seen for an inpatient follow-up psychiatric visit this date  She relates she is still feeling very depressed but denies any suicidal or homicidal ideation at this time  Per nursing report, she is more visible in the community but remains dysphoric  At today's visit, she relates she is not in a good place yet but notes slight improvement  ROS: no complaints    Mental Status Evaluation:    Appearance:  casually dressed, dressed appropriately   Behavior:  cooperative   Speech:  normal rate and volume   Mood:  depressed, dysphoric   Affect:  flat   Thought Process:  logical, coherent   Associations: intact associations   Thought Content:  no overt delusions   Perceptual Disturbances: none   Risk Potential: Suicidal ideation - None at present  Homicidal ideation - None  Potential for aggression - No   Sensorium:  oriented to person, place and time/date   Memory:  recent and remote memory grossly intact   Consciousness:  alert and awake   Attention: poor concentration and poor attention span   Insight:  fair   Judgment: fair   Gait/Station: normal gait/station and normal balance   Motor Activity: no abnormal movements     Vital signs in last 24 hours:    Temp:  [98 6 °F (37 °C)-99 3 °F (37 4 °C)] 99 3 °F (37 4 °C)  HR:  [75-85] 75  Resp:  [16] 16  BP: (100-110)/(58-62) 110/62    Laboratory results:  I have personally reviewed all pertinent laboratory/tests results  Progress Toward Goals: progressing    Assessment/Plan   Principal Problem:    Severe episode of recurrent major depressive disorder (HCC)    Recommended Treatment:   Abilify increased to 5 mg daily  Will continue all other psychiatric medications as prescribed  Discharge disposition and planning are ongoing  All current active medications have been reviewed    Encourage group therapy, milieu therapy and occupational therapy  Behavioral Health checks every 7 minutes      Current Facility-Administered Medications:  acetaminophen 650 mg Oral Q6H PRN Oksana Isidroics, CRNP   acetaminophen 650 mg Oral Q4H PRN Oksana Isidroics, CRNP   acetaminophen 975 mg Oral Q6H PRN Oksana Isidroics, CRNP   aluminum-magnesium hydroxide-simethicone 30 mL Oral Q4H PRN Harrison Flynn, CRNP   [START ON 2/23/2019] ARIPiprazole 5 mg Oral Daily Oksana Flynn, CRNP   benztropine 1 mg Intramuscular Q6H PRN Oksana Flynn, CRNP   benztropine 1 mg Oral Q6H PRN Oksana Flynn, CRNP   DULoxetine 60 mg Oral Daily Oksana Flynn, CRNP   gabapentin 300 mg Oral TID Dima See MD   haloperidol 5 mg Oral Q6H PRN Oksana Isidroics, CRNP   haloperidol lactate 5 mg Intramuscular Q6H PRN Oksana Flynn, CRNP   hydrOXYzine HCL 50 mg Oral Q6H PRN Oksana Flynn, CRNP   LORazepam 2 mg Intramuscular Q6H PRN Oksana Flynn, CRNP   magnesium hydroxide 30 mL Oral Daily PRN Oksana Flynn, CRNP   nicotine 1 patch Transdermal Daily Oksana Flynn, CRNP   risperiDONE 1 mg Oral Q3H PRN Oksana Flynn, CRNP   traZODone 150 mg Oral HS Oksana Flynn, CRNP       Risks / Benefits of Treatment:    Risks, benefits, and possible side effects of medications explained to patient and patient verbalizes understanding and agreement for treatment  Counseling / Coordination of Care:      Patient's progress discussed with staff in treatment team meeting  Medications, treatment progress and treatment plan reviewed with patient

## 2019-02-22 NOTE — PROGRESS NOTES
Anxiety has lessoned  Maintained on Q 7 minute safety checks  No acting out, suicidal ideations, and/or homicidal behaviors  No changes in medical condition or complaints voiced  Fluids maintained at bedside to promote hydration

## 2019-02-22 NOTE — PROGRESS NOTES
Patient took Trazodone 50 mg at [facility] for complaints of insomnia  Effective at present  Patient observed sleeping during most Q 7 minute safety checks (second portion of shift)  No suicidal ideations, acting out or homicidal behaviors  No change in medical condition or further complaints  Fluids maintained at bedside to promote hydration

## 2019-02-22 NOTE — PLAN OF CARE
Need for ongoing medication management and therapy but first pt needs to determine where she will [place] living [place]

## 2019-02-23 PROBLEM — F33.2 SEVERE EPISODE OF RECURRENT MAJOR DEPRESSIVE DISORDER (HCC): Chronic | Status: ACTIVE | Noted: 2019-02-20

## 2019-02-23 PROCEDURE — 99232 SBSQ HOSP IP/OBS MODERATE 35: CPT | Performed by: NURSE PRACTITIONER

## 2019-02-23 RX ORDER — ARIPIPRAZOLE 10 MG/1
10 TABLET ORAL DAILY
Status: DISCONTINUED | OUTPATIENT
Start: 2019-02-24 | End: 2019-02-27

## 2019-02-23 RX ADMIN — ARIPIPRAZOLE 5 MG: 5 TABLET ORAL at 09:18

## 2019-02-23 RX ADMIN — TRAZODONE HYDROCHLORIDE 150 MG: 150 TABLET ORAL at 21:44

## 2019-02-23 RX ADMIN — GABAPENTIN 300 MG: 300 CAPSULE ORAL at 09:18

## 2019-02-23 RX ADMIN — HYDROXYZINE HYDROCHLORIDE 50 MG: 25 TABLET ORAL at 23:01

## 2019-02-23 RX ADMIN — GABAPENTIN 300 MG: 300 CAPSULE ORAL at 17:35

## 2019-02-23 RX ADMIN — DULOXETINE HYDROCHLORIDE 60 MG: 60 CAPSULE, DELAYED RELEASE ORAL at 09:17

## 2019-02-23 RX ADMIN — RISPERIDONE 1 MG: 1 TABLET, ORALLY DISINTEGRATING ORAL at 23:01

## 2019-02-23 RX ADMIN — GABAPENTIN 300 MG: 300 CAPSULE ORAL at 21:44

## 2019-02-23 NOTE — PLAN OF CARE
Problem: Risk for Self Injury/Neglect  Goal: Treatment Goal: Remain safe during length of stay, learn and adopt new coping skills, and be free of self-injurious ideation, impulses and acts at the time of discharge  Outcome: Progressing  Goal: Recognize maladaptive responses and adopt new coping mechanisms  Outcome: Progressing     Problem: Depression  Goal: Treatment Goal: Demonstrate behavioral control of depressive symptoms, verbalize feelings of improved mood/affect, and adopt new coping skills prior to discharge  Outcome: Progressing  Goal: Verbalize thoughts and feelings  Description  Interventions:  - Assess and re-assess patient's level of risk   - Engage patient in 1:1 interactions, daily, for a minimum of 15 minutes   - Encourage patient to express feelings, fears, frustrations, hopes   Outcome: Progressing  Goal: Refrain from harming self  Description  Interventions:  - Monitor patient closely, per order   - Supervise medication ingestion, monitor effects and side effects   Outcome: Progressing  Goal: Refrain from isolation  Description  Interventions:  - Develop a trusting relationship   - Encourage socialization   Outcome: Progressing  Goal: Refrain from self-neglect  Outcome: Progressing  Goal: Complete daily ADLs, including personal hygiene independently, as able  Description  Interventions:  - Observe, teach, and assist patient with ADLS  -  Monitor and promote a balance of rest/activity, with adequate nutrition and elimination   Outcome: Progressing     Problem: Anxiety  Goal: Anxiety is at manageable level  Description  Interventions:  - Assess and monitor patient's anxiety level  - Monitor for signs and symptoms of anxiety both physical and emotional (heart palpitations, chest pain, shortness of breath, headaches, nausea, feeling jumpy, restlessness, irritable, apprehensive)  - Collaborate with interdisciplinary team and initiate plan and interventions as ordered    - [place] patient to unit/surroundings  - Explain treatment plan  - Encourage participation in care  - Encourage verbalization of concerns/fears  - Identify coping mechanisms  - Assist in developing anxiety-reducing skills  - Administer/offer alternative therapies  - Limit or eliminate stimulants  Outcome: Progressing     Problem: Ineffective Coping  Goal: Participates in unit activities  Description  Interventions:  - Provide therapeutic environment   - Provide required programming   - Redirect inappropriate behaviors   Outcome: Progressing     Problem: DISCHARGE PLANNING - CARE MANAGEMENT  Goal: Discharge to post-acute care or home with appropriate resources  Description  INTERVENTIONS:  - Conduct assessment to determine patient/family and health care team treatment goals, and need for post-acute services based on payer coverage, community resources, and patient preferences, and barriers to discharge  - Address psychosocial, clinical, and financial barriers to discharge as identified in assessment in conjunction with the patient/family and health care team  - Arrange appropriate level of post-acute services according to patient's   needs and preference and payer coverage in collaboration with the physician and health care team  - Communicate with and update the patient/family, physician, and health care team regarding progress on the discharge plan  - Arrange appropriate transportation to post-acute venues   Outcome: Progressing     Problem: Nutrition/Hydration-ADULT  Goal: Nutrient/Hydration intake appropriate for improving, restoring or maintaining nutritional needs  Description  Monitor and assess patient's nutrition/hydration status for malnutrition (ex- brittle hair, bruises, dry skin, pale skin and conjunctiva, muscle wasting, smooth red tongue, and disorientation)  Collaborate with interdisciplinary team and initiate plan and interventions as ordered  Monitor patient's weight and dietary intake as ordered or per policy  Utilize nutrition screening tool and intervene per policy  Determine patient's food preferences and provide high-protein, high-caloric foods as appropriate       INTERVENTIONS:  - Monitor oral intake, urinary output, labs, and treatment plans  - Assess nutrition and hydration status and recommend course of action  - Evaluate amount of meals eaten  - Assist patient with eating if necessary   - Allow adequate time for meals  - Recommend/ encourage appropriate diets, oral nutritional supplements, and vitamin/mineral supplements  - Order, calculate, and assess calorie counts as needed  - Recommend, monitor, and adjust tube feedings and TPN/PPN based on assessed needs  - Assess need for intravenous fluids  - Provide specific nutrition/hydration education as appropriate  - Include patient/family/caregiver in decisions related to nutrition  Outcome: Progressing

## 2019-02-23 NOTE — PROGRESS NOTES
Pt minimally visible on unit  Present for meals  Spends most of day reading in room  Guarded; scant in conversation  Reports stressors, "everything in my life " Denies current SI, HI, A/T/V  Compliant with meds  No behavioral issues  Monitoring continues

## 2019-02-23 NOTE — PROGRESS NOTES
Pt was visible on the unit  Non-social with peers  Quietly walking in the halls  Pt reports that she does have passive SI,  Pt states she has thoughts of "what is the point of going on"  Pt states the only things that help with these thoughts are reading or doing things to occupy her mind  The minute she is not busy these thoughts return  Pt states she is not in a good place in her life  Denies HI/AH/VH  Able to make needs known  Will continue to monitor

## 2019-02-23 NOTE — PROGRESS NOTES
Progress Note - Behavioral Health   Jimmye Merlin 44 y o  female MRN: [id number]  Unit/Bed#: [facility] 251-01 Encounter: [id number]    The patient was seen for continuing care and reviewed with treatment team     Mental Status Evaluation:  Appearance:  Marginal/poor hygiene and Poor eye contact   Behavior:  cooperative   Mood:  depressed   Affect: blunted and Flat   Speech: Normal rate and Normal volume   Thought Process:  Goal directed and coherent   Thought Content:  Does not verbalize delusional material   Perceptual Disturbances: Denies hallucinations and does not appear to be responding to internal stimuli   Risk Potential: Suicidal Ideations without plan and Hopeless with death wishes   Attention/Concentration attention span and concentration were age appropriate   Orientation:   Oriented x 3   Gait/Station: normal gait/station and normal balance   Motor Activity: No abnormal movement noted     Progress Toward Goals: Patient requesting an increase in her Abilify and frustrated because she is still not feeling any better  Continues with some intermittent suicidal thoughts without plan or intent, and does feels safe here in the hospital   Will increase her Abilify to start tomorrow, she is tolerating so far  Assessment/Plan    Principal Problem:    Severe episode of recurrent major depressive disorder ([facility] [address] )      Recommended Treatment: Continue with pharmacotherapy, group therapy, milieu therapy and occupational therapy    The patient will be maintained on the following medications:    Current Facility-Administered Medications:  acetaminophen 650 mg Oral Q6H PRN SAMUEL Nunez   acetaminophen 650 mg Oral Q4H PRN SAMUEL Nunez   acetaminophen 975 mg Oral Q6H PRN SAMUEL Nunez   aluminum-magnesium hydroxide-simethicone 30 mL Oral Q4H PRN SAMUEL Nunez   [START ON 2/24/2019] ARIPiprazole 10 mg Oral Daily SAMUEL Leyva   benztropine 1 mg Intramuscular Q6H PRN Idalia Orosco Gee, ZACKNP   benztropine 1 mg Oral Q6H PRN Oksana Flynn, ZACKNP   DULoxetine 60 mg Oral Daily Oksana Flynn, ZACKNP   gabapentin 300 mg Oral TID Vanessa Eduardo MD   haloperidol 5 mg Oral Q6H PRN Oksana Flynn, CRNP   haloperidol lactate 5 mg Intramuscular Q6H PRN Oksana Flynn, CRNP   hydrOXYzine HCL 50 mg Oral Q6H PRN Oksana Flynn, ZACKNP   LORazepam 2 mg Intramuscular Q6H PRN Oksana Flynn, ZACKNP   magnesium hydroxide 30 mL Oral Daily PRN Oksana Flynn, CRNP   nicotine 1 patch Transdermal Daily Oksana Flynn, CRNP   risperiDONE 1 mg Oral Q3H PRN Oksana Flynn, ZACKNP   traZODone 150 mg Oral HS Oksana Flynn, SAMUEL       Risks, benefits and possible side effects of Medications:   Risks, benefits, and possible side effects of medications explained to patient and patient verbalizes understanding

## 2019-02-23 NOTE — PROGRESS NOTES
Pt denies any current si/hi at this time, pt remains pleasant and cooperative, pt still appears flat at this time  Pt has been medication and meal compliant  Pt denies any additional issues at this time

## 2019-02-23 NOTE — PROGRESS NOTES
Pt received PRN Risperdal and atarax prior to sleep for agitation and anxiety  Medication appeared effective and pt has been sleeping comfortably through out the night  No distress noted on visual assessment  Will continue to monitor

## 2019-02-24 PROCEDURE — 99232 SBSQ HOSP IP/OBS MODERATE 35: CPT | Performed by: NURSE PRACTITIONER

## 2019-02-24 RX ORDER — GABAPENTIN 300 MG/1
300 CAPSULE ORAL 2 TIMES DAILY
Status: DISCONTINUED | OUTPATIENT
Start: 2019-02-24 | End: 2019-03-08 | Stop reason: HOSPADM

## 2019-02-24 RX ORDER — GABAPENTIN 300 MG/1
600 CAPSULE ORAL
Status: DISCONTINUED | OUTPATIENT
Start: 2019-02-24 | End: 2019-03-08 | Stop reason: HOSPADM

## 2019-02-24 RX ADMIN — TRAZODONE HYDROCHLORIDE 150 MG: 150 TABLET ORAL at 22:01

## 2019-02-24 RX ADMIN — DULOXETINE HYDROCHLORIDE 60 MG: 60 CAPSULE, DELAYED RELEASE ORAL at 09:13

## 2019-02-24 RX ADMIN — ARIPIPRAZOLE 10 MG: 10 TABLET ORAL at 09:13

## 2019-02-24 RX ADMIN — GABAPENTIN 600 MG: 300 CAPSULE ORAL at 22:01

## 2019-02-24 RX ADMIN — GABAPENTIN 300 MG: 300 CAPSULE ORAL at 16:00

## 2019-02-24 RX ADMIN — GABAPENTIN 300 MG: 300 CAPSULE ORAL at 09:13

## 2019-02-24 NOTE — PROGRESS NOTES
Progress Note - Behavioral Health   Ami Aguilera 44 y o  female MRN: [id number]  Unit/Bed#: [facility] 251-01 Encounter: [id number]    The patient was seen for continuing care and reviewed with treatment team     Mental Status Evaluation:  Appearance:  Adequate hygiene and grooming   Behavior:  cooperative   Mood:  anxious and depressed   Affect: Flat   Speech: Normal rate and Normal volume   Thought Process:  Goal directed and coherent   Thought Content:  Does not verbalize delusional material   Perceptual Disturbances: Denies hallucinations and does not appear to be responding to internal stimuli   Risk Potential: Suicidal Ideations without plan   Attention/Concentration attention span and concentration were age appropriate   Orientation:   Oriented x 3   Gait/Station: normal gait/station and normal balance   Motor Activity: No abnormal movement noted     Progress Toward Goals: Patient continues to have trouble sleeping at night, cannot stop the worrying thoughts  Will increase her HS dose of gabapentin  She did require PRN Risperdal last night which was effective - but she is already on scheduled Abilify 10 mg  She still feels "no better"  Her increased dose of Abilify started this morning  Continues with intermittent passive SI  She keeps to herself and is reading in her room  Assessment/Plan    Principal Problem:    Severe episode of recurrent major depressive disorder, without psychotic features ([facility] [address] )      Recommended Treatment: Continue with pharmacotherapy, group therapy, milieu therapy and occupational therapy    The patient will be maintained on the following medications:    Current Facility-Administered Medications:  acetaminophen 650 mg Oral Q6H PRN Oksana Flynn, CRNP   acetaminophen 650 mg Oral Q4H PRN Oksana Flynn, CRNP   acetaminophen 975 mg Oral Q6H PRN Oksana Flynn, CRNP   aluminum-magnesium hydroxide-simethicone 30 mL Oral Q4H PRN Oksana Flynn, CRNP   ARIPiprazole 10 mg Oral Daily Kelly Paul, CRNP   benztropine 1 mg Intramuscular Q6H PRN Oksana Flynn, CRNP   benztropine 1 mg Oral Q6H PRN Courtney Flynn, CRNP   DULoxetine 60 mg Oral Daily Oksana Flynn, CRNP   gabapentin 300 mg Oral BID Kellyfernando Paul, CRNP   gabapentin 600 mg Oral HS Kelly Paul, CRNP   haloperidol 5 mg Oral Q6H PRN Oksana Flynn, CRNP   haloperidol lactate 5 mg Intramuscular Q6H PRN Oksana Flynn, CRNP   hydrOXYzine HCL 50 mg Oral Q6H PRN Oksana Flynn, CRNP   LORazepam 2 mg Intramuscular Q6H PRN Oksana Flynn, CRNP   magnesium hydroxide 30 mL Oral Daily PRN kOsana Flynn, CRNP   nicotine 1 patch Transdermal Daily Oksana Flynn, CRNP   risperiDONE 1 mg Oral Q3H PRN Oksana Flynn, CRNP   traZODone 150 mg Oral HS Oksana Flynn, CRNP       Risks, benefits and possible side effects of Medications:   Risks, benefits, and possible side effects of medications explained to patient and patient verbalizes understanding

## 2019-02-24 NOTE — PROGRESS NOTES
Patient withdrawn to room but positive for meals  Patient rates anxiety and depression 4/10  Denies SI and HI currently  Flat affect and poor eye contact  Patient encouraged to attend group but responded "people irritate me so I rather not " Poor insight, unaware where she's going upon discharge and does not have a job to return to  Pt is dismissive when answering questions, vague answers  Poor motivation, encouraged patient to shower and participate in ADLs, patient declined  Patient cooperative with medication schedule, no behavioral problems    Patient able to make needs known  No signs or symptoms of distress  Alert and oriented  SP 1 and Q 7 minute checks will be maintained for patient safety  Will continue to monitor

## 2019-02-24 NOTE — PROGRESS NOTES
Patient has been in and out of her room for most of evening shift  Reports she has been having passive s/i but the frequency is decreasing  Contracts for safety  Appears flat and depressed but brightens on approach  Says she has noticed some improvement since being admitted  Reports her anxiety has remained controlled with distraction  Identified reading as a distraction technique  Asked patient if she has considered talking with the other patients as a distraction technique but she says she tends to get irritated easily and at this point she feels better keeping to herself  Took HS meds at 2144 and retired to her room  At 2300 she came out and reported mild anxiety and agitation and requested prn risperdal and atarax  Prns provided    Currently sleeping

## 2019-02-24 NOTE — PROGRESS NOTES
Patient withdrawn to room for most of the day but positive for meals and group  Poor eye contact  Patient encouraged to shower but declined  Cooperative with medication schedule  Will continue to monitor

## 2019-02-24 NOTE — PLAN OF CARE
Problem: Risk for Self Injury/Neglect  Goal: Treatment Goal: Remain safe during length of stay, learn and adopt new coping skills, and be free of self-injurious ideation, impulses and acts at the time of discharge  Outcome: Progressing  Goal: Recognize maladaptive responses and adopt new coping mechanisms  Outcome: Progressing     Problem: Depression  Goal: Treatment Goal: Demonstrate behavioral control of depressive symptoms, verbalize feelings of improved mood/affect, and adopt new coping skills prior to discharge  Outcome: Progressing  Goal: Verbalize thoughts and feelings  Description  Interventions:  - Assess and re-assess patient's level of risk   - Engage patient in 1:1 interactions, daily, for a minimum of 15 minutes   - Encourage patient to express feelings, fears, frustrations, hopes   Outcome: Progressing  Goal: Refrain from harming self  Description  Interventions:  - Monitor patient closely, per order   - Supervise medication ingestion, monitor effects and side effects   Outcome: Progressing  Goal: Refrain from isolation  Description  Interventions:  - Develop a trusting relationship   - Encourage socialization   Outcome: Progressing  Goal: Refrain from self-neglect  Outcome: Progressing  Goal: Complete daily ADLs, including personal hygiene independently, as able  Description  Interventions:  - Observe, teach, and assist patient with ADLS  -  Monitor and promote a balance of rest/activity, with adequate nutrition and elimination   Outcome: Progressing     Problem: Anxiety  Goal: Anxiety is at manageable level  Description  Interventions:  - Assess and monitor patient's anxiety level  - Monitor for signs and symptoms of anxiety both physical and emotional (heart palpitations, chest pain, shortness of breath, headaches, nausea, feeling jumpy, restlessness, irritable, apprehensive)  - Collaborate with interdisciplinary team and initiate plan and interventions as ordered    - [place] patient to unit/surroundings  - Explain treatment plan  - Encourage participation in care  - Encourage verbalization of concerns/fears  - Identify coping mechanisms  - Assist in developing anxiety-reducing skills  - Administer/offer alternative therapies  - Limit or eliminate stimulants  Outcome: Progressing     Problem: Ineffective Coping  Goal: Participates in unit activities  Description  Interventions:  - Provide therapeutic environment   - Provide required programming   - Redirect inappropriate behaviors   Outcome: Progressing     Problem: DISCHARGE PLANNING - CARE MANAGEMENT  Goal: Discharge to post-acute care or home with appropriate resources  Description  INTERVENTIONS:  - Conduct assessment to determine patient/family and health care team treatment goals, and need for post-acute services based on payer coverage, community resources, and patient preferences, and barriers to discharge  - Address psychosocial, clinical, and financial barriers to discharge as identified in assessment in conjunction with the patient/family and health care team  - Arrange appropriate level of post-acute services according to patient's   needs and preference and payer coverage in collaboration with the physician and health care team  - Communicate with and update the patient/family, physician, and health care team regarding progress on the discharge plan  - Arrange appropriate transportation to post-acute venues   Outcome: Progressing

## 2019-02-25 PROCEDURE — 99232 SBSQ HOSP IP/OBS MODERATE 35: CPT | Performed by: NURSE PRACTITIONER

## 2019-02-25 RX ORDER — HYDROXYZINE HYDROCHLORIDE 25 MG/1
50 TABLET, FILM COATED ORAL EVERY 4 HOURS PRN
Status: DISCONTINUED | OUTPATIENT
Start: 2019-02-25 | End: 2019-03-08 | Stop reason: HOSPADM

## 2019-02-25 RX ORDER — HYDROXYZINE HYDROCHLORIDE 25 MG/1
25 TABLET, FILM COATED ORAL EVERY 6 HOURS PRN
Status: DISCONTINUED | OUTPATIENT
Start: 2019-02-25 | End: 2019-03-08 | Stop reason: HOSPADM

## 2019-02-25 RX ORDER — HYDROXYZINE HYDROCHLORIDE 25 MG/1
50 TABLET, FILM COATED ORAL EVERY 6 HOURS PRN
Status: DISCONTINUED | OUTPATIENT
Start: 2019-02-25 | End: 2019-03-08 | Stop reason: HOSPADM

## 2019-02-25 RX ORDER — OLANZAPINE 10 MG/1
10 INJECTION, POWDER, LYOPHILIZED, FOR SOLUTION INTRAMUSCULAR
Status: DISCONTINUED | OUTPATIENT
Start: 2019-02-25 | End: 2019-03-08 | Stop reason: HOSPADM

## 2019-02-25 RX ADMIN — GABAPENTIN 300 MG: 300 CAPSULE ORAL at 10:00

## 2019-02-25 RX ADMIN — DULOXETINE HYDROCHLORIDE 90 MG: 60 CAPSULE, DELAYED RELEASE ORAL at 13:43

## 2019-02-25 RX ADMIN — ARIPIPRAZOLE 10 MG: 10 TABLET ORAL at 10:00

## 2019-02-25 RX ADMIN — GABAPENTIN 600 MG: 300 CAPSULE ORAL at 20:59

## 2019-02-25 RX ADMIN — TRAZODONE HYDROCHLORIDE 150 MG: 150 TABLET ORAL at 21:00

## 2019-02-25 RX ADMIN — GABAPENTIN 300 MG: 300 CAPSULE ORAL at 17:00

## 2019-02-25 NOTE — PROGRESS NOTES
Patient is more visible on the unit today than previous days  Continues to have poor eye contact but attending groups  Patient denies SI and HI currently, no behavioral problems  Patient verbalized feeling a little better on the medications but states " I probably need them increase still "  Patient slightly more social  Affect flat  Patient shower and participating in ADLs  Alert and oriented  Able to make needs known  No signs or symptoms of distress  SP 1 and Q 7 minute checks will be maintained for patient safety  Will continue to monitor

## 2019-02-25 NOTE — PROGRESS NOTES
Patient has been in and out of her room this evening  Spent a little more time in the community than she did on previous night's shift  Denies any current s/i but rates depression as 6/10  Anxiety has been controlled with continued use of coping skills  No observed interactions with peers

## 2019-02-25 NOTE — PLAN OF CARE
Problem: Depression  Goal: Treatment Goal: Demonstrate behavioral control of depressive symptoms, verbalize feelings of improved mood/affect, and adopt new coping skills prior to discharge  Outcome: Progressing  Goal: Verbalize thoughts and feelings  Description  Interventions:  - Assess and re-assess patient's level of risk   - Engage patient in 1:1 interactions, daily, for a minimum of 15 minutes   - Encourage patient to express feelings, fears, frustrations, hopes   Outcome: Progressing  Goal: Refrain from harming self  Description  Interventions:  - Monitor patient closely, per order   - Supervise medication ingestion, monitor effects and side effects   Outcome: Progressing  Goal: Refrain from isolation  Description  Interventions:  - Develop a trusting relationship   - Encourage socialization   Outcome: Progressing  Goal: Refrain from self-neglect  Outcome: Progressing  Goal: Complete daily ADLs, including personal hygiene independently, as able  Description  Interventions:  - Observe, teach, and assist patient with ADLS  -  Monitor and promote a balance of rest/activity, with adequate nutrition and elimination   Outcome: Progressing     Problem: Anxiety  Goal: Anxiety is at manageable level  Description  Interventions:  - Assess and monitor patient's anxiety level  - Monitor for signs and symptoms of anxiety both physical and emotional (heart palpitations, chest pain, shortness of breath, headaches, nausea, feeling jumpy, restlessness, irritable, apprehensive)  - Collaborate with interdisciplinary team and initiate plan and interventions as ordered    - [place] patient to unit/surroundings  - Explain treatment plan  - Encourage participation in care  - Encourage verbalization of concerns/fears  - Identify coping mechanisms  - Assist in developing anxiety-reducing skills  - Administer/offer alternative therapies  - Limit or eliminate stimulants  Outcome: Progressing     Problem: Ineffective Coping  Goal: Participates in unit activities  Description  Interventions:  - Provide therapeutic environment   - Provide required programming   - Redirect inappropriate behaviors   Outcome: Progressing     Problem: DISCHARGE PLANNING - CARE MANAGEMENT  Goal: Discharge to post-acute care or home with appropriate resources  Description  INTERVENTIONS:  - Conduct assessment to determine patient/family and health care team treatment goals, and need for post-acute services based on payer coverage, community resources, and patient preferences, and barriers to discharge  - Address psychosocial, clinical, and financial barriers to discharge as identified in assessment in conjunction with the patient/family and health care team  - Arrange appropriate level of post-acute services according to patient's   needs and preference and payer coverage in collaboration with the physician and health care team  - Communicate with and update the patient/family, physician, and health care team regarding progress on the discharge plan  - Arrange appropriate transportation to post-acute venues   Outcome: Progressing

## 2019-02-25 NOTE — PROGRESS NOTES
Progress Note - Behavioral Health     Mouna Nazario 44 y o  female MRN: [id number]   Unit/Bed#: [facility] 251-01 Encounter: [id number]    Behavior over the last 24 hours:  Otto Andrea was seen for an inpatient follow-up psychiatric visit this date  She relates she is still feeling depressed, hopeless, and is not ready to be discharged  She feels her medications are somewhat helpful but still need to be adjusted  Per nursing report, she has been withdrawn, depressed, and vague  At today's visit, medications as well as treatment plan was discussed  She continues to require inpatient hospitalization due to feelings of hopelessness and depression with change in medication  ROS: no complaints    Mental Status Evaluation:    Appearance:  casually dressed, dressed appropriately   Behavior:  cooperative, calm   Speech:  normal rate and volume   Mood:  depressed, dysphoric   Affect:  flat   Thought Process:  coherent   Associations: intact associations   Thought Content:  normal, no overt delusions   Perceptual Disturbances: none   Risk Potential: Suicidal ideation - Yes, fleeting suicidal thoughts  Homicidal ideation - None  Potential for aggression - No   Sensorium:  oriented to person, place and time/date   Memory:  recent and remote memory grossly intact   Consciousness:  alert and awake   Attention: decreased concentration and decreased attention span   Insight:  fair   Judgment: fair   Gait/Station: normal gait/station and normal balance   Motor Activity: no abnormal movements     Vital signs in last 24 hours:    Temp:  [98 7 °F (37 1 °C)-98 9 °F (37 2 °C)] 98 9 °F (37 2 °C)  HR:  [60-83] 60  Resp:  [16] 16  BP: (109)/(51-55) 109/55    Laboratory results:  I have personally reviewed all pertinent laboratory/tests results      Progress Toward Goals: progressing    Assessment/Plan   Principal Problem:    Severe episode of recurrent major depressive disorder, without psychotic features ([facility]    Recommended Treatment: Cymbalta increased to 90 mg daily for continued depression  Continue other psychiatric medications as prescribed and adjust as necessary  Discharge disposition and planning are ongoing  All current active medications have been reviewed  Encourage group therapy, milieu therapy and occupational therapy  [address] checks every 7 minutes      Current Facility-Administered Medications:  acetaminophen 650 mg Oral Q6H PRN Oksana N Lodics, CRNP   acetaminophen 650 mg Oral Q4H PRN Oksana N Lodics, CRNP   acetaminophen 975 mg Oral Q6H PRN Oksana N Dwaineics, CRNP   aluminum-magnesium hydroxide-simethicone 30 mL Oral Q4H PRN Oksana N Lodics, CRNP   ARIPiprazole 10 mg Oral Daily Michael Allison, CRNP   benztropine 1 mg Intramuscular Q6H PRN Oksana RAMSEY Lodics, CRNP   benztropine 1 mg Oral Q6H PRN Alma Isidroics, CRNP   DULoxetine 90 mg Oral Daily Daniel Dhaliwal MD   gabapentin 300 mg Oral BID Michael Allison, CRNP   gabapentin 600 mg Oral HS Michael Cooper, CRNP   haloperidol 5 mg Oral Q6H PRN Oksana N Lodics, CRNP   haloperidol lactate 5 mg Intramuscular Q6H PRN Oksana N Lodics, CRNP   hydrOXYzine HCL 50 mg Oral Q6H PRN Oksana N Lodics, CRNP   LORazepam 2 mg Intramuscular Q6H PRN Oksana N Lodics, CRNP   magnesium hydroxide 30 mL Oral Daily PRN Oksana N Lodics, CRNP   nicotine 1 patch Transdermal Daily Oksana Isidroics, CRNP   risperiDONE 1 mg Oral Q3H PRN Oksana N Lodics, CRNP   traZODone 150 mg Oral HS Oksana Flynn, CRNP       Risks / Benefits of Treatment:    Risks, benefits, and possible side effects of medications explained to patient and patient verbalizes understanding and agreement for treatment  Counseling / Coordination of Care:      Patient's progress discussed with staff in treatment team meeting  Medications, treatment progress and treatment plan reviewed with patient

## 2019-02-26 PROCEDURE — 99232 SBSQ HOSP IP/OBS MODERATE 35: CPT | Performed by: PSYCHIATRY & NEUROLOGY

## 2019-02-26 RX ORDER — TRAZODONE HYDROCHLORIDE 50 MG/1
100 TABLET ORAL
Status: DISCONTINUED | OUTPATIENT
Start: 2019-02-26 | End: 2019-03-02

## 2019-02-26 RX ORDER — MIRTAZAPINE 15 MG/1
7.5 TABLET, FILM COATED ORAL
Status: DISCONTINUED | OUTPATIENT
Start: 2019-02-26 | End: 2019-02-27

## 2019-02-26 RX ADMIN — GABAPENTIN 600 MG: 300 CAPSULE ORAL at 21:48

## 2019-02-26 RX ADMIN — GABAPENTIN 300 MG: 300 CAPSULE ORAL at 16:59

## 2019-02-26 RX ADMIN — HYDROXYZINE HYDROCHLORIDE 50 MG: 25 TABLET ORAL at 02:23

## 2019-02-26 RX ADMIN — ARIPIPRAZOLE 10 MG: 10 TABLET ORAL at 08:31

## 2019-02-26 RX ADMIN — DULOXETINE HYDROCHLORIDE 90 MG: 60 CAPSULE, DELAYED RELEASE ORAL at 08:31

## 2019-02-26 RX ADMIN — MIRTAZAPINE 7.5 MG: 15 TABLET, FILM COATED ORAL at 21:49

## 2019-02-26 RX ADMIN — GABAPENTIN 300 MG: 300 CAPSULE ORAL at 08:31

## 2019-02-26 NOTE — PROGRESS NOTES
Patient has been visible in the community  Attended and participated in evening unit activities  She has been socializing with peers which she had previously not been  Seems much improved  Brighter with each passing day  Insight has improved greatly since Friday

## 2019-02-26 NOTE — PROGRESS NOTES
Patient out at approximately 0230 with complaints about her roommate  Anxiety at 8/10  Received prn atarax 50mg  Medication effective  Currently sleeping

## 2019-02-26 NOTE — PROGRESS NOTES
Pt was seen on the unit at breakfast, pt made fleeting eye contact, but appeared more cheerful at this interview  Pt denies current si/hi att his time  Pt has been able to let her needs be n=known to staff  Will continue to monitor

## 2019-02-26 NOTE — SOCIAL WORK
Aftercare Planning:     [facility]: message left  Dr Ozzy Oneill: line busy  Dr Anna Francisco: out of the office for today

## 2019-02-26 NOTE — PROGRESS NOTES
Progress Note - Behavioral Health     Javier Points 44 y o  female MRN: [id number]   Unit/Bed#: -01 Encounter: [id number]    Behavior over the last 24 hours: improved  Sunshine is looking a little bit better today  She credits the medication adjustments, the milieu, nursing support and being able to work with Case Management on an aftercare plan  She still struggles with sleep and anergia, and has a difficult time thinking about her future without overwhelming anxiety  Sleep: insomnia  Appetite: decreased  Medication side effects: No   ROS: no complaints    Mental Status Evaluation:    Appearance:  age appropriate   Behavior:  slow responses   Speech:  delayed   Mood:  depressed, dysphoric   Affect:  constricted   Thought Process:  decreased rate of thoughts   Associations: intact associations   Thought Content:  obsessions, negative thinking   Perceptual Disturbances: none   Risk Potential: Suicidal ideation - Yes, fleeting suicidal thoughts  Homicidal ideation - None  Potential for aggression - No   Sensorium:  oriented to person, place and time/date   Memory:  recent and remote memory grossly intact   Consciousness:  alert and awake   Attention: attention span and concentration are age appropriate   Insight:  age appropriate   Judgment: improving   Gait/Station: normal gait/station   Motor Activity: no abnormal movements     Vital signs in last 24 hours:    Temp:  [98 2 °F (36 8 °C)-98 7 °F (37 1 °C)] 98 7 °F (37 1 °C)  HR:  [61-74] 74  Resp:  [16] 16  BP: ()/(55) 106/55    Laboratory results:    I have personally reviewed all pertinent laboratory/tests results    Most Recent Labs:   Lab Results   Component Value Date    WBC 9 30 02/19/2019    RBC 4 44 02/19/2019    HGB 13 7 02/19/2019    HCT 40 9 02/19/2019     02/19/2019    RDW 14 1 02/19/2019    NEUTROABS 5 30 02/19/2019    SODIUM 137 02/19/2019    K 4 3 02/19/2019     02/19/2019    CO2 23 02/19/2019    BUN 13 02/19/2019 CREATININE 0 71 02/19/2019    GLUC 125 02/19/2019    CALCIUM 9 3 02/19/2019    AST 11 (L) 02/19/2019    ALT 11 02/19/2019    ALKPHOS 85 02/19/2019    TP 6 9 02/19/2019    ALB 4 3 02/19/2019    TBILI 0 20 02/19/2019    CHOLESTEROL 193 02/20/2019    HDL 28 (L) 02/20/2019    TRIG 323 (H) 02/20/2019    LDLCALC 100 02/20/2019    [place] 165 02/20/2019    [id number] 3 230 02/19/2019    RPR Non-Reactive 02/20/2019       Progress Toward Goals: improving    But still struggles to maintain sleep with trazodone  Feels that she is waking up overwhelmed about thoughts about her future  Will substitute mirtazapine  Continue trazodone as a prn if that fails  Also could use Atarax  But probably not all three  Assessment/Plan   Principal Problem:    Severe episode of recurrent major depressive disorder, without psychotic features ([facility] )    Recommended Treatment:     Planned medication and treatment changes:  As above  All current active medications have been reviewed    Encourage group therapy, milieu therapy and occupational therapy  [address] checks every 7 minutes      Current Facility-Administered Medications:  acetaminophen 650 mg Oral Q6H PRN Oksana N Lodics, CRNP   acetaminophen 650 mg Oral Q4H PRN Oksana N Lodics, CRNP   acetaminophen 975 mg Oral Q6H PRN Oksana N Lodics, CRNP   aluminum-magnesium hydroxide-simethicone 30 mL Oral Q4H PRN Oksana N Lodics, CRNP   ARIPiprazole 10 mg Oral Daily Gera Backer, CRNP   benztropine 1 mg Oral Q6H PRN Micah Oziel Lodics, CRNP   DULoxetine 90 mg Oral Daily Bao Beck MD   gabapentin 300 mg Oral BID Gera Backer, CRNP   gabapentin 600 mg Oral HS Gera Backer, CRNP   haloperidol 5 mg Oral Q6H PRN Oksana N Lodics, CRNP   hydrOXYzine HCL 25 mg Oral Q6H PRN Oksana N Lodics, CRNP   hydrOXYzine HCL 50 mg Oral Q4H PRN Oksana N Lodics, CRNP   hydrOXYzine HCL 50 mg Oral Q6H PRN Oksana N Lodics, CRNP   magnesium hydroxide 30 mL Oral Daily PRN Micah Arciniegaz SAMUEL Flynn   mirtazapine 7 5 mg Oral HS Sherita Meigs, MD   OLANZapine 10 mg Intramuscular Q3H PRN SAMUEL Nunez   risperiDONE 1 mg Oral Q3H PRN SAMUEL Nunez   traZODone 100 mg Oral HS PRN Sherita Meigs, MD       Risks / Benefits of Treatment:    Risks, benefits, and possible side effects of medications explained to patient and patient verbalizes understanding and agreement for treatment  Counseling / Coordination of Care:    Patient's progress discussed with staff in treatment team meeting  Medications, treatment progress and treatment plan reviewed with patient      Sherita Meigs, MD 02/26/19

## 2019-02-27 PROCEDURE — 99231 SBSQ HOSP IP/OBS SF/LOW 25: CPT | Performed by: PSYCHIATRY & NEUROLOGY

## 2019-02-27 RX ORDER — MIRTAZAPINE 15 MG/1
7.5 TABLET, FILM COATED ORAL
Status: DISCONTINUED | OUTPATIENT
Start: 2019-02-27 | End: 2019-03-01

## 2019-02-27 RX ADMIN — ARIPIPRAZOLE 10 MG: 10 TABLET ORAL at 08:45

## 2019-02-27 RX ADMIN — GABAPENTIN 300 MG: 300 CAPSULE ORAL at 08:45

## 2019-02-27 RX ADMIN — GABAPENTIN 300 MG: 300 CAPSULE ORAL at 16:53

## 2019-02-27 RX ADMIN — MIRTAZAPINE 7.5 MG: 15 TABLET, FILM COATED ORAL at 21:13

## 2019-02-27 RX ADMIN — DULOXETINE HYDROCHLORIDE 90 MG: 60 CAPSULE, DELAYED RELEASE ORAL at 08:45

## 2019-02-27 RX ADMIN — GABAPENTIN 600 MG: 300 CAPSULE ORAL at 21:12

## 2019-02-27 NOTE — PLAN OF CARE
PT actively participates in art therapy groups and has positive social interactions with peers   During group processing, PT does display fair insight into her illness and recovery

## 2019-02-27 NOTE — CASE MANAGEMENT
CM met with patient to review the Treatment Plan  The patient was observed in her room, reading; responded readily  Stated her depression has been improving, rating the level at 5 or 6 out of 10 as compared to 10 out of 10 on admission  She did state that her anxiety was more likely to rise, especially when thinking about d/c  Currently uncertain if she will stay with the gentleman with whom she had been living, or relocate to [facility] with another "old friend from school " She stated that she needs assistance as provided by a  in "getting back on my feet  Patient informed that her residence would determine what area would be contacted for a referral  Current MA had been transferred to [facility] when she moved; another transfer would be required if moving to [facility]  Patient reported understanding and stated she would need to return to her current residence at least initially but would work on making a firm decision  She denied psychotic sx  Stated she had not had suicidal thoughts for "2 whole days " Mood more stable, with no periods of tearfullness  Has been involved in groups and activities  Goals and Objectives of the Treatment Plan reviewed  Patient indicated agreement and signed the Treatment Plan

## 2019-02-27 NOTE — PLAN OF CARE
Problem: Depression  Goal: Refrain from harming self  Description  Interventions:  - Monitor patient closely, per order   - Supervise medication ingestion, monitor effects and side effects   Outcome: Completed  Goal: Refrain from isolation  Description  Interventions:  - Develop a trusting relationship   - Encourage socialization   Outcome: Completed  Goal: Refrain from self-neglect  Outcome: Completed  Goal: Complete daily ADLs, including personal hygiene independently, as able  Description  Interventions:  - Observe, teach, and assist patient with ADLS  -  Monitor and promote a balance of rest/activity, with adequate nutrition and elimination   Outcome: Completed   Pt has displayed only safe behaviors on the unit  Pt reports she has not experienced thoughts of SI for 2 days now      Problem: Depression  Goal: Treatment Goal: Demonstrate behavioral control of depressive symptoms, verbalize feelings of improved mood/affect, and adopt new coping skills prior to discharge  Outcome: Progressing  Goal: Verbalize thoughts and feelings  Description  Interventions:  - Assess and re-assess patient's level of risk   - Engage patient in 1:1 interactions, daily, for a minimum of 15 minutes   - Encourage patient to express feelings, fears, frustrations, hopes   Outcome: Progressing     Problem: Anxiety  Goal: Anxiety is at manageable level  Description  Interventions:  - Assess and monitor patient's anxiety level  - Monitor for signs and symptoms of anxiety both physical and emotional (heart palpitations, chest pain, shortness of breath, headaches, nausea, feeling jumpy, restlessness, irritable, apprehensive)  - Collaborate with interdisciplinary team and initiate plan and interventions as ordered    - [place] patient to unit/surroundings  - Explain treatment plan  - Encourage participation in care  - Encourage verbalization of concerns/fears  - Identify coping mechanisms  - Assist in developing anxiety-reducing skills  - Administer/offer alternative therapies  - Limit or eliminate stimulants  Outcome: Progressing     Problem: Ineffective Coping  Goal: Participates in unit activities  Description  Interventions:  - Provide therapeutic environment   - Provide required programming   - Redirect inappropriate behaviors   Outcome: Progressing     Problem: DISCHARGE PLANNING - CARE MANAGEMENT  Goal: Discharge to post-acute care or home with appropriate resources  Description  INTERVENTIONS:  - Conduct assessment to determine patient/family and health care team treatment goals, and need for post-acute services based on payer coverage, community resources, and patient preferences, and barriers to discharge  - Address psychosocial, clinical, and financial barriers to discharge as identified in assessment in conjunction with the patient/family and health care team  - Arrange appropriate level of post-acute services according to patient's   needs and preference and payer coverage in collaboration with the physician and health care team  - Communicate with and update the patient/family, physician, and health care team regarding progress on the discharge plan  - Arrange appropriate transportation to post-acute venues   Outcome: Progressing   Pt continues to work on coping with anxiety/depression   No change in plan at this time

## 2019-02-27 NOTE — PROGRESS NOTES
Patient has been sleeping well through the night  No changes or problems  Q 7 minute safety checks maintained

## 2019-02-27 NOTE — PROGRESS NOTES
Progress Note - Behavioral Health     Irene Petit 44 y o  female MRN: [id number]   Unit/Bed#: -01 Encounter: [id number]    Behavior over the last 24 hours: improved  [place] continues to benefit from milieu support, medication adjustments and aftercare considerations  We increased her medications again to optimize her antidepressant effect  Sleep: improved  Appetite: increased  Medication side effects: sedation and weight gain   ROS: no complaints    Mental Status Evaluation:    Appearance:  age appropriate   Behavior:  calm   Speech:  slow, scant   Mood:  improved   Affect:  brighter   Thought Process:  goal directed   Associations: intact associations   Thought Content:  negative thoughts   Perceptual Disturbances: none   Risk Potential: Suicidal ideation - Yes, without plan  Homicidal ideation - None  Potential for aggression - No   Sensorium:  oriented to person, place and time/date   Memory:  recent and remote memory grossly intact   Consciousness:  alert and awake   Attention: attention span and concentration are age appropriate   Insight:  age appropriate   Judgment: improving   Gait/Station: normal gait/station   Motor Activity: no abnormal movements     Vital signs in last 24 hours:    Temp:  [97 9 °F (36 6 °C)-99 2 °F (37 3 °C)] 97 9 °F (36 6 °C)  HR:  [70-84] 70  Resp:  [18] 18  BP: ()/(57-62) 128/62    Laboratory results:    I have personally reviewed all pertinent laboratory/tests results    Most Recent Labs:   Lab Results   Component Value Date    WBC 9 30 02/19/2019    RBC 4 44 02/19/2019    HGB 13 7 02/19/2019    HCT 40 9 02/19/2019     02/19/2019    RDW 14 1 02/19/2019    NEUTROABS 5 30 02/19/2019    SODIUM 137 02/19/2019    K 4 3 02/19/2019     02/19/2019    CO2 23 02/19/2019    BUN 13 02/19/2019    CREATININE 0 71 02/19/2019    GLUC 125 02/19/2019    CALCIUM 9 3 02/19/2019    AST 11 (L) 02/19/2019    ALT 11 02/19/2019    ALKPHOS 85 02/19/2019    TP 6 9 02/19/2019    ALB 4 3 02/19/2019    TBILI 0 20 02/19/2019    CHOLESTEROL 193 02/20/2019    HDL 28 (L) 02/20/2019    TRIG 323 (H) 02/20/2019    LDLCALC 100 02/20/2019    [place] 165 02/20/2019    [id number] 3 230 02/19/2019    RPR Non-Reactive 02/20/2019       Progress Toward Goals: improving    Assessment/Plan   Principal Problem:    Severe episode of recurrent major depressive disorder, without psychotic features ([facility] [address] )    Recommended Treatment:     Planned medication and treatment changes: All current active medications have been reviewed  Encourage group therapy, milieu therapy and occupational therapy  Behavioral Health checks every 7 minutes    Titrate aripiprazole as tolerated         Current Facility-Administered Medications:  acetaminophen 650 mg Oral Q6H PRN Oksana Flynn, CRNP   acetaminophen 650 mg Oral Q4H PRN Oksana Flynn, CRNP   acetaminophen 975 mg Oral Q6H PRN Oksana Flynn, CRNP   aluminum-magnesium hydroxide-simethicone 30 mL Oral Q4H PRN Rikki Drew Lodics, CRNP   [START ON 2/28/2019] ARIPiprazole 12 5 mg Oral Daily Aretha Carbone MD   benztropine 1 mg Oral Q6H PRN Rikki Drew Lodics, SAMUEL   DULoxetine 90 mg Oral Daily Aretha Carbone MD   gabapentin 300 mg Oral BID Maryann Valery, CRNP   gabapentin 600 mg Oral HS Maryann Valery, CRNP   haloperidol 5 mg Oral Q6H PRN Oksana Flynn, ZACKNP   hydrOXYzine HCL 25 mg Oral Q6H PRN Oksana Flynn, CRNP   hydrOXYzine HCL 50 mg Oral Q4H PRN Oksana Flynn, CRNP   hydrOXYzine HCL 50 mg Oral Q6H PRN Oksana Flynn, CRNP   magnesium hydroxide 30 mL Oral Daily PRN Oksana Flynn, ZACKNP   mirtazapine 7 5 mg Oral HS Aretha Carbone MD   OLANZapine 10 mg Intramuscular Q3H PRN Oksana Flynn, CRNP   risperiDONE 1 mg Oral Q3H PRN Oksana Flynn, ZACKNP   traZODone 100 mg Oral HS PRN Aretha Carbone MD       Risks / Benefits of Treatment:    Risks, benefits, and possible side effects of medications explained to patient and patient verbalizes understanding and agreement for treatment  Counseling / Coordination of Care:    Patient's progress discussed with staff in treatment team meeting  Medications, treatment progress and treatment plan reviewed with patient      Cherelle Rollins MD 02/27/19

## 2019-02-27 NOTE — PLAN OF CARE
Remains compliant with participation in aftercare planning  Continues to express motivation in recovery

## 2019-02-27 NOTE — SOCIAL WORK
SW met with pt to discuss case management referral   Pt signed referral for [facility] and then informed SW that she still doesn't know what she is doing  Pt is still considering moving to [facility]  SW explained to pt that if she moves out of [facility] it will be difficult to set up services as her insurance will not be accepted in that county  SW explained that they offer the same type of services in each county  SW informed pt that she should be close to discharge and therefore she needs to make a decision soon so we know how to proceed; pt agreed  [facility] referral faxed to [facility]

## 2019-02-27 NOTE — PROGRESS NOTES
Patient has been visible on the unit this evening  Bright, pleasant and social with peers  Denies any SI/HI/AH/VH  Interacts appropriately with peers  Med compliant

## 2019-02-27 NOTE — PROGRESS NOTES
Pt presents for assessment following am meal  Roused from nap in bed  Presents for assessment clean but disheveled  Offering fleeting eye contact  Affect is flat but brightens on approach and at times during assessment  Pt reports overall improvement in condition since admission  Does continue to report anxiety and depression but that both have decreased with medication  Pt also states, "This is my second day without suicidal thoughts"  Pt seems reluctant to feel good about feeling better  Does reports she will require outpatient services after d/c in order to be successful-treatment team made aware  Pt is compliant with medication and denies questions  Agrees to make needs known through the day   Will continue to monitor

## 2019-02-28 PROCEDURE — 99231 SBSQ HOSP IP/OBS SF/LOW 25: CPT | Performed by: NURSE PRACTITIONER

## 2019-02-28 RX ADMIN — GABAPENTIN 600 MG: 300 CAPSULE ORAL at 21:04

## 2019-02-28 RX ADMIN — MIRTAZAPINE 7.5 MG: 15 TABLET, FILM COATED ORAL at 21:04

## 2019-02-28 RX ADMIN — GABAPENTIN 300 MG: 300 CAPSULE ORAL at 18:08

## 2019-02-28 RX ADMIN — ARIPIPRAZOLE 12.5 MG: 10 TABLET ORAL at 08:13

## 2019-02-28 RX ADMIN — DULOXETINE HYDROCHLORIDE 90 MG: 60 CAPSULE, DELAYED RELEASE ORAL at 08:13

## 2019-02-28 RX ADMIN — GABAPENTIN 300 MG: 300 CAPSULE ORAL at 08:13

## 2019-02-28 NOTE — PROGRESS NOTES
Patient has been visible on unit  Social and interactive with select peers  In attendance for group and snack  Reports feeling improvement since admission  Rates depression 5/10 and anxiety 7/10  Reports anxiety is worse at night than during the day  Denies SI reporting "this is Day 3 with no thoughts " Patient appears hopeful when stating this  Reports working with CM on outside resources  Denies any needs at this time  Will continue to monitor

## 2019-02-28 NOTE — PROGRESS NOTES
Pt approached for the day's assessment  Pt reports she is having a "down day" but that overall everything is still ok  Pt denies any specific reason for this change aside from being anxious about uncertainties regarding discharge  Pt denies any active SI  Contracts for safety on the unit  Pt does discuss options for discharge and though still uncertain, shares that she feels her best option will be to return to the home she came from that "isn't ideal" but that will allow her to start receiving services in [facility]AL [facility]ICINE [facility] then possibly move out of [facility]  Pt denies acute concerns at this time  Informs she continues to use reading as a distraction from anxiety  Agrees to report changes in needs   Will continue to monitor

## 2019-02-28 NOTE — PLAN OF CARE
CM referral submitted  Pt will stay in BE[facility]AL MEDICINE AT [facility] so SW can make referral for psych/therapy

## 2019-02-28 NOTE — PLAN OF CARE
Problem: Depression  Goal: Treatment Goal: Demonstrate behavioral control of depressive symptoms, verbalize feelings of improved mood/affect, and adopt new coping skills prior to discharge  Outcome: Progressing  Goal: Verbalize thoughts and feelings  Description  Interventions:  - Assess and re-assess patient's level of risk   - Engage patient in 1:1 interactions, daily, for a minimum of 15 minutes   - Encourage patient to express feelings, fears, frustrations, hopes   Outcome: Progressing     Problem: Anxiety  Goal: Anxiety is at manageable level  Description  Interventions:  - Assess and monitor patient's anxiety level  - Monitor for signs and symptoms of anxiety both physical and emotional (heart palpitations, chest pain, shortness of breath, headaches, nausea, feeling jumpy, restlessness, irritable, apprehensive)  - Collaborate with interdisciplinary team and initiate plan and interventions as ordered    - [place] patient to unit/surroundings  - Explain treatment plan  - Encourage participation in care  - Encourage verbalization of concerns/fears  - Identify coping mechanisms  - Assist in developing anxiety-reducing skills  - Administer/offer alternative therapies  - Limit or eliminate stimulants  Outcome: Progressing     Problem: Ineffective Coping  Goal: Participates in unit activities  Description  Interventions:  - Provide therapeutic environment   - Provide required programming   - Redirect inappropriate behaviors   Outcome: Progressing     Problem: DISCHARGE PLANNING - CARE MANAGEMENT  Goal: Discharge to post-acute care or home with appropriate resources  Description  INTERVENTIONS:  - Conduct assessment to determine patient/family and health care team treatment goals, and need for post-acute services based on payer coverage, community resources, and patient preferences, and barriers to discharge  - Address psychosocial, clinical, and financial barriers to discharge as identified in assessment in conjunction with the patient/family and health care team  - Arrange appropriate level of post-acute services according to patient's   needs and preference and payer coverage in collaboration with the physician and health care team  - Communicate with and update the patient/family, physician, and health care team regarding progress on the discharge plan  - Arrange appropriate transportation to post-acute venues   Outcome: Progressing   Pt continues to show progress with the above listed goals   No change in plan at this time

## 2019-02-28 NOTE — PROGRESS NOTES
Presents for assessment after am meal  Offers flat affect, irritable mood and fleeting eye contact  Accepted scheduled medication  With conversation, pt informed she is not a morning person and, "I hate everyone and everything in the morning"  This writer asked patient about current suicidality and mood  Pt replied, "like I said, I am not a morning person"  Pt ultimately requested to revisit her assessment later today  On approach to encourage patient to attend morning pharmacy group, pt declined while informing she has already had that group and remained in bed   Will continue to monitor for change

## 2019-03-01 PROCEDURE — 99232 SBSQ HOSP IP/OBS MODERATE 35: CPT | Performed by: PSYCHIATRY & NEUROLOGY

## 2019-03-01 RX ORDER — DULOXETIN HYDROCHLORIDE 60 MG/1
60 CAPSULE, DELAYED RELEASE ORAL DAILY
Status: DISCONTINUED | OUTPATIENT
Start: 2019-03-02 | End: 2019-03-05

## 2019-03-01 RX ORDER — MIRTAZAPINE 15 MG/1
15 TABLET, FILM COATED ORAL
Status: DISCONTINUED | OUTPATIENT
Start: 2019-03-01 | End: 2019-03-02

## 2019-03-01 RX ORDER — LITHIUM CARBONATE 300 MG/1
300 TABLET, FILM COATED, EXTENDED RELEASE ORAL EVERY 12 HOURS SCHEDULED
Status: DISCONTINUED | OUTPATIENT
Start: 2019-03-01 | End: 2019-03-08 | Stop reason: HOSPADM

## 2019-03-01 RX ADMIN — MIRTAZAPINE 15 MG: 15 TABLET, FILM COATED ORAL at 21:02

## 2019-03-01 RX ADMIN — ARIPIPRAZOLE 12.5 MG: 10 TABLET ORAL at 08:18

## 2019-03-01 RX ADMIN — GABAPENTIN 600 MG: 300 CAPSULE ORAL at 21:02

## 2019-03-01 RX ADMIN — GABAPENTIN 300 MG: 300 CAPSULE ORAL at 08:19

## 2019-03-01 RX ADMIN — DULOXETINE HYDROCHLORIDE 90 MG: 60 CAPSULE, DELAYED RELEASE ORAL at 08:18

## 2019-03-01 RX ADMIN — GABAPENTIN 300 MG: 300 CAPSULE ORAL at 16:54

## 2019-03-01 RX ADMIN — LITHIUM CARBONATE EXTENDED-RELEASE TABLET 300 MG: 300 TABLET, FILM COATED, EXTENDED RELEASE ORAL at 21:02

## 2019-03-01 NOTE — PLAN OF CARE
Problem: Depression  Goal: Treatment Goal: Demonstrate behavioral control of depressive symptoms, verbalize feelings of improved mood/affect, and adopt new coping skills prior to discharge  Outcome: Progressing  Goal: Verbalize thoughts and feelings  Description  Interventions:  - Assess and re-assess patient's level of risk   - Engage patient in 1:1 interactions, daily, for a minimum of 15 minutes   - Encourage patient to express feelings, fears, frustrations, hopes   Outcome: Progressing     Problem: Anxiety  Goal: Anxiety is at manageable level  Description  Interventions:  - Assess and monitor patient's anxiety level  - Monitor for signs and symptoms of anxiety both physical and emotional (heart palpitations, chest pain, shortness of breath, headaches, nausea, feeling jumpy, restlessness, irritable, apprehensive)  - Collaborate with interdisciplinary team and initiate plan and interventions as ordered    - [place] patient to unit/surroundings  - Explain treatment plan  - Encourage participation in care  - Encourage verbalization of concerns/fears  - Identify coping mechanisms  - Assist in developing anxiety-reducing skills  - Administer/offer alternative therapies  - Limit or eliminate stimulants  Outcome: Progressing     Problem: Ineffective Coping  Goal: Participates in unit activities  Description  Interventions:  - Provide therapeutic environment   - Provide required programming   - Redirect inappropriate behaviors   Outcome: Progressing     Problem: DISCHARGE PLANNING - CARE MANAGEMENT  Goal: Discharge to post-acute care or home with appropriate resources  Description  INTERVENTIONS:  - Conduct assessment to determine patient/family and health care team treatment goals, and need for post-acute services based on payer coverage, community resources, and patient preferences, and barriers to discharge  - Address psychosocial, clinical, and financial barriers to discharge as identified in assessment in conjunction with the patient/family and health care team  - Arrange appropriate level of post-acute services according to patient's   needs and preference and payer coverage in collaboration with the physician and health care team  - Communicate with and update the patient/family, physician, and health care team regarding progress on the discharge plan  - Arrange appropriate transportation to post-acute venues   Outcome: Progressing   Pt continues to report anxiety and depression but that are manageable  Continues to display appropriate use of coping skills

## 2019-03-01 NOTE — PROGRESS NOTES
Patient observed social on unit  Admits to depression but no suicidal ideations  Complaint with medications  Anxiety remains, however she admits that it is controlled  Stated the two previous days were much better than today  She would not explain this statement  States that she is tired from reading all day  Had snack and drink  Maintained on q 7 minutes

## 2019-03-01 NOTE — SOCIAL WORK
CM placed a call to Dr Cori Fish office  to schedule a follow up psych appointment  CM spoke with March 13th at 2:15pm  PT to arrive 13 early, bring picture ID and insurance card  [place] [address] [facility] [address]  CM called and followed up with [facility] at [phone]  Spoke with Daniel Durbin, transferred to [place]  Keisha indicated she did not receive the fax, CM to refax to   Att  Keisha and include the psych eval      CM refaxed case management referral to [place] with [facility]  CM placed call back to [place] with [facility] to confirm receipt of the fax  Left message requesting return call

## 2019-03-01 NOTE — PROGRESS NOTES
Progress Note - Behavioral Health     Shavonne Tirado 44 y o  female MRN: [id number]   Unit/Bed#: U 251-01 Encounter: [id number]    Behavior over the last 24 hours: regressed  Sunshine is feeling a bit worse, as she thinks about her extended separation from her children  Tearful, hopeless, depressed, demoralized, feeling that she would seriously regress and think of suicide when she leaves the hospital    She appears miserable and cries during the entire interview  We discuss how she was considered to have Bipolar depression as a child, and had a brief trial of lithium at that time  Sleep: insomnia  Appetite: poor  Medication side effects: sedation/activation   ROS: no complaints    Mental Status Evaluation:    Appearance:  disheveled   Behavior:  agitated   Speech:  hypertalkative   Mood:  anxious, irritable, angry   Affect:  constricted, tearful, labile   Thought Process:  racing of thoughts   Associations: tangential associations   Thought Content:  negative thinking   Perceptual Disturbances: none   Risk Potential: Suicidal ideation - Yes  Homicidal ideation - None  Potential for aggression - No   Sensorium:  oriented to person, place and time/date   Memory:  recent and remote memory grossly intact   Consciousness:  alert and awake   Attention: attention span and concentration appear shorter than expected for age   Insight:  partial   Judgment: partial   Gait/Station: normal gait/station   Motor Activity: no abnormal movements     Vital signs in last 24 hours:    Temp:  [98 °F (36 7 °C)-98 1 °F (36 7 °C)] 98 °F (36 7 °C)  HR:  [66-88] 88  Resp:  [16] 16  BP: ()/(56-64) 98/56    Laboratory results:    I have personally reviewed all pertinent laboratory/tests results    Most Recent Labs:   Lab Results   Component Value Date    WBC 9 30 02/19/2019    RBC 4 44 02/19/2019    HGB 13 7 02/19/2019    HCT 40 9 02/19/2019     02/19/2019    RDW 14 1 02/19/2019    NEUTROABS 5 30 02/19/2019 SODIUM 137 02/19/2019    K 4 3 02/19/2019     02/19/2019    CO2 23 02/19/2019    BUN 13 02/19/2019    CREATININE 0 71 02/19/2019    GLUC 125 02/19/2019    CALCIUM 9 3 02/19/2019    AST 11 (L) 02/19/2019    ALT 11 02/19/2019    ALKPHOS 85 02/19/2019    TP 6 9 02/19/2019    ALB 4 3 02/19/2019    TBILI 0 20 02/19/2019    CHOLESTEROL 193 02/20/2019    HDL 28 (L) 02/20/2019    TRIG 323 (H) 02/20/2019    LDLCALC 100 02/20/2019    [place] 165 02/20/2019    WLB4RWCDDPLL 3 230 02/19/2019    RPR Non-Reactive 02/20/2019       Progress Toward Goals: regressed    Assessment/Plan   Principal Problem:    Severe episode of recurrent major depressive disorder, without psychotic features ([facility] [address] )    Recommended Treatment:     Planned medication and treatment changes: All current active medications have been reviewed  Encourage group therapy, milieu therapy and occupational therapy  Behavioral Health checks every 7 minutes    Consider trial of lithium  Decrease duloxetine  Adjust mirtazapine         Current Facility-Administered Medications:  acetaminophen 650 mg Oral Q6H PRN Oksana N Lodics, CRNP   acetaminophen 650 mg Oral Q4H PRN Oksana N Lodics, CRNP   acetaminophen 975 mg Oral Q6H PRN Oksana N Lodics, CRNP   aluminum-magnesium hydroxide-simethicone 30 mL Oral Q4H PRN Oksana N Lodics, CRNP   ARIPiprazole 12 5 mg Oral Daily Lynn Galarza MD   benztropine 1 mg Oral Q6H PRN Samantha Chelsi Lodics, CRNP   DULoxetine 90 mg Oral Daily Lynn Galarza MD   gabapentin 300 mg Oral BID [place] Angelica, CRNP   gabapentin 600 mg Oral HS [place] Angelica, CRNP   haloperidol 5 mg Oral Q6H PRN Oksana N Lodics, CRNP   hydrOXYzine HCL 25 mg Oral Q6H PRN Oksana N Lodics, CRNP   hydrOXYzine HCL 50 mg Oral Q4H PRN Oksana N Lodics, CRNP   hydrOXYzine HCL 50 mg Oral Q6H PRN Oksana N Lodics, CRNP   magnesium hydroxide 30 mL Oral Daily PRN Oksana N Lodics, CRNP   mirtazapine 7 5 mg Oral HS Lynn Galarza MD   OLANZapine 10 mg Intramuscular Q3H PRN SAMUEL Nunez   risperiDONE 1 mg Oral Q3H PRN SAMUEL Nunez   traZODone 100 mg Oral HS PRN Cherelle Rollins MD       Risks / Benefits of Treatment:    Risks, benefits, and possible side effects of medications explained to patient and patient verbalizes understanding and agreement for treatment  Counseling / Coordination of Care:    Patient's progress discussed with staff in treatment team meeting  Medications, treatment progress and treatment plan reviewed with patient      Cherelle Rollins MD 03/01/19

## 2019-03-01 NOTE — SOCIAL WORK
LYUBOV placed follow up call with Keisha with [facility], left message requesting return call

## 2019-03-01 NOTE — PROGRESS NOTES
No issues with anxiety last night  Patient observed sleeping during most Q 7 minute safety checks (second portion of shift)  No suicidal ideations, acting out or homicidal behaviors  No change in medical condition or complaints voiced  Fluids maintained at bedside to promote hydration

## 2019-03-01 NOTE — PROGRESS NOTES
Pt presents for morning assessment clean but disheveled  Eye contact fleeting  Pt reports she is becoming increasingly anxious about pending discharge  Reassurance and positive feedback offered  Pt denies any active SI and contracts for safety  Remains med compliant with no concerns   Will continue to monitor

## 2019-03-01 NOTE — DISCHARGE INSTR - APPOINTMENTS
The treatment team recommends ongoing medication management upon discharge  A referral has been made on your behalf to Dr Tony Camacho MD located at [address] [place] [address],  Phone: [phone]  Your appointment is scheduled for 03/13/2019 at 2:15pm   Please plan to arrive 15 minutes prior to your scheduled appointment and bring your insurance card(s), photo ID  Your discharge will be faxed to this provider for continuity of care  A referral has been made to [facility] for case management services  Dexter Wolfe is the supervisor and is currently reviewing the referral, she will contact you to schedule intake  She may be reached directly at [address] ext  [phone]  You did not identify a Primary Care Physician, (PCP), during your hospital stay and declined a referral made on your behalf  You may call the number on the reverse of your insurance card for referral information  You may seek medical services at an [facility] if needed

## 2019-03-01 NOTE — DISCHARGE INSTR - OTHER ORDERS
You are being discharged to your home located at [facility] [address] [facility] [address], [address]  Triggers you have identified during your hospitalization that led to your suicidal ideation,  distressed mood, include limited community and mental health supports  Coping skills you have identified during your hospitalization include reading, coloring, and nature  If you are unable to deal with your distressed mood alone please contact your psychiatrist Dr Johan Okeefe MD at   If that is not effective and you continue to have suicidal ideation, distressed mood, overwhelmed, in crisis please contact [facility] at  [phone], call [phone], or go to the emergency room  *National Suicide Prevention Lifeline:  1-190.664.7976  *Peer Support Talk Line (Seven days a week, 1:00 PM  9:00 PM) Call: 679.951.2643 or Text: 967.623.7975  *Alcohol Anonymous: 820.981.7523  *[place] [employer] Commission: (310) 345-2193  [facility] on [address] [place] [address] ([facility]) HELPLINE: 154.417.6184/Website: www evelia Astrid  *Substance Abuse and [address] [facility]) [employer], which is a confidential, free, 24-hour-a-day, 365-day-a-year, information service for individuals and family members facing mental health and/or substance use disorders  This service provides referrals to local treatment facilities, support groups, and community-based organizations  Callers can also order free publications and other information  Call 8-219.221.5914/Website: www [facility] gov  *[facility] 2-1-1: This is a toll free, confidential, 24-hour-a-day service which connects you to a community  in your area who can help you find services and resources that are available to you locally and provide critical services that can improve and save lives    Call: 211  /Website: https://debbieKeepRecipesbria saini/

## 2019-03-02 PROCEDURE — 99231 SBSQ HOSP IP/OBS SF/LOW 25: CPT | Performed by: NURSE PRACTITIONER

## 2019-03-02 RX ORDER — TRAZODONE HYDROCHLORIDE 150 MG/1
150 TABLET ORAL
Status: DISCONTINUED | OUTPATIENT
Start: 2019-03-02 | End: 2019-03-08 | Stop reason: HOSPADM

## 2019-03-02 RX ADMIN — TRAZODONE HYDROCHLORIDE 150 MG: 150 TABLET ORAL at 21:25

## 2019-03-02 RX ADMIN — GABAPENTIN 300 MG: 300 CAPSULE ORAL at 16:48

## 2019-03-02 RX ADMIN — ARIPIPRAZOLE 12.5 MG: 10 TABLET ORAL at 08:12

## 2019-03-02 RX ADMIN — LITHIUM CARBONATE EXTENDED-RELEASE TABLET 300 MG: 300 TABLET, FILM COATED, EXTENDED RELEASE ORAL at 08:14

## 2019-03-02 RX ADMIN — GABAPENTIN 600 MG: 300 CAPSULE ORAL at 21:25

## 2019-03-02 RX ADMIN — DULOXETINE HYDROCHLORIDE 60 MG: 60 CAPSULE, DELAYED RELEASE ORAL at 08:14

## 2019-03-02 RX ADMIN — GABAPENTIN 300 MG: 300 CAPSULE ORAL at 08:14

## 2019-03-02 RX ADMIN — LITHIUM CARBONATE EXTENDED-RELEASE TABLET 300 MG: 300 TABLET, FILM COATED, EXTENDED RELEASE ORAL at 21:25

## 2019-03-02 NOTE — PROGRESS NOTES
Patient is bright, alert, ate breakfast with peers,visible on unit  Patient tells this nurse she was having suicidal thoughts yesterday but denies si hi and hallucinations today  She does tell this nurse" but it might be too early to tell " when asked if having thoughts to harm herself at this moment  Patient is compliant with medications and care and attends groups with participation  Will maintain on safety precautions and 7 minute checks, no needs identified

## 2019-03-02 NOTE — PLAN OF CARE
Problem: Depression  Goal: Treatment Goal: Demonstrate behavioral control of depressive symptoms, verbalize feelings of improved mood/affect, and adopt new coping skills prior to discharge  Outcome: Progressing  Goal: Verbalize thoughts and feelings  Description  Interventions:  - Assess and re-assess patient's level of risk   - Engage patient in 1:1 interactions, daily, for a minimum of 15 minutes   - Encourage patient to express feelings, fears, frustrations, hopes   Outcome: Progressing     Problem: Anxiety  Goal: Anxiety is at manageable level  Description  Interventions:  - Assess and monitor patient's anxiety level  - Monitor for signs and symptoms of anxiety both physical and emotional (heart palpitations, chest pain, shortness of breath, headaches, nausea, feeling jumpy, restlessness, irritable, apprehensive)  - Collaborate with interdisciplinary team and initiate plan and interventions as ordered    - [place] patient to unit/surroundings  - Explain treatment plan  - Encourage participation in care  - Encourage verbalization of concerns/fears  - Identify coping mechanisms  - Assist in developing anxiety-reducing skills  - Administer/offer alternative therapies  - Limit or eliminate stimulants  Outcome: Progressing     Problem: Ineffective Coping  Goal: Participates in unit activities  Description  Interventions:  - Provide therapeutic environment   - Provide required programming   - Redirect inappropriate behaviors   Outcome: Progressing     Problem: DISCHARGE PLANNING - CARE MANAGEMENT  Goal: Discharge to post-acute care or home with appropriate resources  Description  INTERVENTIONS:  - Conduct assessment to determine patient/family and health care team treatment goals, and need for post-acute services based on payer coverage, community resources, and patient preferences, and barriers to discharge  - Address psychosocial, clinical, and financial barriers to discharge as identified in assessment in conjunction with the patient/family and health care team  - Arrange appropriate level of post-acute services according to patient's   needs and preference and payer coverage in collaboration with the physician and health care team  - Communicate with and update the patient/family, physician, and health care team regarding progress on the discharge plan  - Arrange appropriate transportation to post-acute venues   Outcome: Progressing

## 2019-03-02 NOTE — PROGRESS NOTES
Progress Note - Behavioral Health   Lesley Lucero 44 y o  female MRN: [id number]  Unit/Bed#: [facility] 251-01 Encounter: [id number]    Assessment/Plan   Principal Problem:    Severe episode of recurrent major depressive disorder, without psychotic features ([address] )   Patient was seen for continuing care and treatment  Case was discussed with the nursing staff  There are no new clinical issues or concerns voiced by the patient today  She was recently started on lithium to help with mood instability and lability  No adverse effects noted  Denies any suicidal thoughts today  We will continue to monitor this patient as she has goes through the medication adjustments  She did report poor sleep and she is asking if she could resume trazodone  I have added the trazodone 150 mg at bedtime  Behavior over the last 24 hours:  unchanged  Sleep: normal  Appetite: normal  Medication side effects: No  ROS: no complaints    Mental Status Evaluation:  Appearance:  casually dressed   Behavior:  normal   Speech:  normal volume   Mood:  labile   Affect:  labile   Thought Process:  perserverative   Thought Content:  obsessions   Perceptual Disturbances: None   Risk Potential: Denies   Sensorium:  person and place   Cognition:  grossly intact   Consciousness:  alert and awake    Attention: attention span and concentration were age appropriate   Insight:  fair   Judgment: fair   Gait/Station: normal gait/station   Motor Activity: no abnormal movements     Progress Toward Goals: Remainds depressed    Recommended Treatment: Continue with group therapy, milieu therapy and occupational therapy  Risks, benefits and possible side effects of Medications:   Risks, benefits, and possible side effects of medications explained to patient and patient verbalizes understanding  Medications: continue current psychiatric medications  Labs: Reviewed    Counseling / Coordination of Care  Total floor / unit time spent today 15 minutes  Greater than 50% of total time was spent with the patient and / or family counseling and / or coordination of care   A description of the counseling / coordination of care: 10

## 2019-03-03 PROCEDURE — 99231 SBSQ HOSP IP/OBS SF/LOW 25: CPT | Performed by: NURSE PRACTITIONER

## 2019-03-03 RX ORDER — LANOLIN ALCOHOL/MO/W.PET/CERES
9 CREAM (GRAM) TOPICAL
Status: DISCONTINUED | OUTPATIENT
Start: 2019-03-03 | End: 2019-03-08 | Stop reason: HOSPADM

## 2019-03-03 RX ADMIN — TRAZODONE HYDROCHLORIDE 150 MG: 150 TABLET ORAL at 21:30

## 2019-03-03 RX ADMIN — LITHIUM CARBONATE EXTENDED-RELEASE TABLET 300 MG: 300 TABLET, FILM COATED, EXTENDED RELEASE ORAL at 21:30

## 2019-03-03 RX ADMIN — DULOXETINE HYDROCHLORIDE 60 MG: 60 CAPSULE, DELAYED RELEASE ORAL at 08:28

## 2019-03-03 RX ADMIN — GABAPENTIN 600 MG: 300 CAPSULE ORAL at 21:30

## 2019-03-03 RX ADMIN — GABAPENTIN 300 MG: 300 CAPSULE ORAL at 16:57

## 2019-03-03 RX ADMIN — GABAPENTIN 300 MG: 300 CAPSULE ORAL at 08:26

## 2019-03-03 RX ADMIN — MELATONIN TAB 3 MG 9 MG: 3 TAB at 21:30

## 2019-03-03 RX ADMIN — ARIPIPRAZOLE 12.5 MG: 10 TABLET ORAL at 08:26

## 2019-03-03 RX ADMIN — LITHIUM CARBONATE EXTENDED-RELEASE TABLET 300 MG: 300 TABLET, FILM COATED, EXTENDED RELEASE ORAL at 08:26

## 2019-03-03 NOTE — PLAN OF CARE
Problem: Depression  Goal: Treatment Goal: Demonstrate behavioral control of depressive symptoms, verbalize feelings of improved mood/affect, and adopt new coping skills prior to discharge  Outcome: Progressing  Goal: Verbalize thoughts and feelings  Description  Interventions:  - Assess and re-assess patient's level of risk   - Engage patient in 1:1 interactions, daily, for a minimum of 15 minutes   - Encourage patient to express feelings, fears, frustrations, hopes   Outcome: Progressing     Problem: Anxiety  Goal: Anxiety is at manageable level  Description  Interventions:  - Assess and monitor patient's anxiety level  - Monitor for signs and symptoms of anxiety both physical and emotional (heart palpitations, chest pain, shortness of breath, headaches, nausea, feeling jumpy, restlessness, irritable, apprehensive)  - Collaborate with interdisciplinary team and initiate plan and interventions as ordered    - [place] patient to unit/surroundings  - Explain treatment plan  - Encourage participation in care  - Encourage verbalization of concerns/fears  - Identify coping mechanisms  - Assist in developing anxiety-reducing skills  - Administer/offer alternative therapies  - Limit or eliminate stimulants  Outcome: Progressing     Problem: Ineffective Coping  Goal: Participates in unit activities  Description  Interventions:  - Provide therapeutic environment   - Provide required programming   - Redirect inappropriate behaviors   Outcome: Progressing     Problem: DISCHARGE PLANNING - CARE MANAGEMENT  Goal: Discharge to post-acute care or home with appropriate resources  Description  INTERVENTIONS:  - Conduct assessment to determine patient/family and health care team treatment goals, and need for post-acute services based on payer coverage, community resources, and patient preferences, and barriers to discharge  - Address psychosocial, clinical, and financial barriers to discharge as identified in assessment in conjunction with the patient/family and health care team  - Arrange appropriate level of post-acute services according to patient's   needs and preference and payer coverage in collaboration with the physician and health care team  - Communicate with and update the patient/family, physician, and health care team regarding progress on the discharge plan  - Arrange appropriate transportation to post-acute venues   Outcome: Progressing

## 2019-03-03 NOTE — PROGRESS NOTES
Patient bright, alert, awake, ate breakfast with peers, back to bed after eating, declined to attend group  Pt remains flat, selectively social with peers, compliant with medications  Pt tells this nurse " I had weird dreams and woke up a few times overnight despite the change back to trazadone"  She also tells me "Its so early, I  haven't had a chance to think about it yet" when asked if she was having suicidal ideation this am  Will maintain on safety precautions and 7 minute checks, no needs identified

## 2019-03-03 NOTE — PLAN OF CARE
PT continues to attend all art therapy groups, follows instructions and has positive social interactions

## 2019-03-03 NOTE — PROGRESS NOTES
Patient has been visible on the unit  Attending and participating in unit activities  Denies s/i but reports she has been "more depressed" over the past three days  She did state that today she was beginning to feel some improvement  She has been out and socializing with her peers which is a big improvement over last weekend  She did express concern over her dc and says she gets overwhelmed every time it is brought up

## 2019-03-03 NOTE — PROGRESS NOTES
Progress Note - Behavioral Health     Moses Min 44 y o  female MRN: [id number]   Unit/Bed#: [facility] 251-01 Encounter: [id number]    Behavior over the last 24 hours:  Tara Hernández was seen this day for an inpatient follow-up psychiatric visit  Per nursing report, she has been out in the milieu and more social   She has reported passive SI to staff but denies any suicidal or homicidal ideation at today's visit as well as any auditory or visual hallucinations  She complains that she is not sleeping too well  Her appetite is within normal limits  Her affect is somewhat brighter and she appears  overall improved  ROS: no complaints    Mental Status Evaluation:    Appearance:  casually dressed, dressed appropriately   Behavior:  pleasant, cooperative   Speech:  normal rate and volume   Mood:  Slightly dysphoric; improved   Affect:  appropriate   Thought Process:  organized, logical, coherent   Associations: intact associations   Thought Content:  normal   Perceptual Disturbances: none   Risk Potential: Suicidal ideation - None  Homicidal ideation - None  Potential for aggression - No   Sensorium:  oriented to person, place and time/date   Memory:  recent and remote memory grossly intact   Consciousness:  alert and awake   Attention: attention span and concentration are improving   Insight:  fair   Judgment: fair   Gait/Station: normal gait/station and normal balance   Motor Activity: no abnormal movements     Vital signs in last 24 hours:    Temp:  [98 °F (36 7 °C)-98 4 °F (36 9 °C)] 98 °F (36 7 °C)  HR:  [67-74] 67  Resp:  [16] 16  BP: (115-117)/(63-70) 115/63    Laboratory results:  I have personally reviewed all pertinent laboratory/tests results  Progress Toward Goals: progressing    Assessment/Plan   Principal Problem:    Severe episode of recurrent major depressive disorder, without psychotic [facility]    Recommended Treatment:   Will continue current medications as prescribed and add melatonin 9 mg q h s  For sleep improvement  Will continue to monitor patient and adjust medications as needed  Plan is for discharge early next week to partial program pending any change in patient's status  All current active medications have been reviewed  Encourage group therapy, milieu therapy and occupational therapy  [facility] checks every 7 minutes      Current Facility-Administered Medications:  acetaminophen 650 mg Oral Q6H PRN Oksana N Lodics, CRNP   acetaminophen 650 mg Oral Q4H PRN Oksana N Lodics, CRNP   acetaminophen 975 mg Oral Q6H PRN Oksana N Lodics, CRNP   aluminum-magnesium hydroxide-simethicone 30 mL Oral Q4H PRN Oksana N Lodics, CRNP   ARIPiprazole 12 5 mg Oral Daily David Van MD   benztropine 1 mg Oral Q6H PRN Patricia Flynn, CRNP   DULoxetine 60 mg Oral Daily David Van MD   gabapentin 300 mg Oral BID Comfort Ohm, CRNP   gabapentin 600 mg Oral HS Comfort Ohm, CRNP   haloperidol 5 mg Oral Q6H PRN Oksana RAMSEY Lodics, CRNP   hydrOXYzine HCL 25 mg Oral Q6H PRN Oksana RAMSEY Lodics, CRNP   hydrOXYzine HCL 50 mg Oral Q4H PRN Oksana RAMSEY Lodics, CRNP   hydrOXYzine HCL 50 mg Oral Q6H PRN Oksana RAMSEY Lodics, CRNP   lithium carbonate 300 mg Oral Q12H MD Fidelia   magnesium hydroxide 30 mL Oral Daily PRN Oksana RAMSEY Lodics, CRNP   OLANZapine 10 mg Intramuscular Q3H PRN Oksana N Lodics, CRNP   risperiDONE 1 mg Oral Q3H PRN [place] RAMSEY Lodics, CRNP   traZODone 150 mg Oral HS SAMUEL Sanders       Risks / Benefits of Treatment:    Risks, benefits, and possible side effects of medications explained to patient and patient verbalizes understanding and agreement for treatment  Counseling / Coordination of Care:      Patient's progress reviewed with nursing staff  Medications, treatment progress and treatment plan reviewed with patient

## 2019-03-04 PROCEDURE — 99232 SBSQ HOSP IP/OBS MODERATE 35: CPT | Performed by: NURSE PRACTITIONER

## 2019-03-04 RX ADMIN — LITHIUM CARBONATE EXTENDED-RELEASE TABLET 300 MG: 300 TABLET, FILM COATED, EXTENDED RELEASE ORAL at 08:38

## 2019-03-04 RX ADMIN — GABAPENTIN 300 MG: 300 CAPSULE ORAL at 16:20

## 2019-03-04 RX ADMIN — LITHIUM CARBONATE EXTENDED-RELEASE TABLET 300 MG: 300 TABLET, FILM COATED, EXTENDED RELEASE ORAL at 21:11

## 2019-03-04 RX ADMIN — ARIPIPRAZOLE 12.5 MG: 10 TABLET ORAL at 08:38

## 2019-03-04 RX ADMIN — TRAZODONE HYDROCHLORIDE 150 MG: 150 TABLET ORAL at 21:11

## 2019-03-04 RX ADMIN — MELATONIN TAB 3 MG 9 MG: 3 TAB at 21:11

## 2019-03-04 RX ADMIN — GABAPENTIN 600 MG: 300 CAPSULE ORAL at 21:10

## 2019-03-04 RX ADMIN — DULOXETINE HYDROCHLORIDE 60 MG: 60 CAPSULE, DELAYED RELEASE ORAL at 08:38

## 2019-03-04 RX ADMIN — GABAPENTIN 300 MG: 300 CAPSULE ORAL at 08:38

## 2019-03-04 NOTE — PROGRESS NOTES
Patient has been visible on the unit  Attended and participated in evening unit activities  Socializing with peers  Reports no real changes since previous night's assessment  States her depression "is still there" but indicates it is relatively controlled compared to when she first came in  Denies s/i  Said she is  Hoping to see more improvement in her mood and "feel more stable" prior to discharge

## 2019-03-04 NOTE — PLAN OF CARE
Problem: Depression  Goal: Treatment Goal: Demonstrate behavioral control of depressive symptoms, verbalize feelings of improved mood/affect, and adopt new coping skills prior to discharge  Outcome: Progressing  Goal: Verbalize thoughts and feelings  Description  Interventions:  - Assess and re-assess patient's level of risk   - Engage patient in 1:1 interactions, daily, for a minimum of 15 minutes   - Encourage patient to express feelings, fears, frustrations, hopes   Outcome: Progressing     Problem: Anxiety  Goal: Anxiety is at manageable level  Description  Interventions:  - Assess and monitor patient's anxiety level  - Monitor for signs and symptoms of anxiety both physical and emotional (heart palpitations, chest pain, shortness of breath, headaches, nausea, feeling jumpy, restlessness, irritable, apprehensive)  - Collaborate with interdisciplinary team and initiate plan and interventions as ordered    - [place] patient to unit/surroundings  - Explain treatment plan  - Encourage participation in care  - Encourage verbalization of concerns/fears  - Identify coping mechanisms  - Assist in developing anxiety-reducing skills  - Administer/offer alternative therapies  - Limit or eliminate stimulants  Outcome: Progressing     Problem: Ineffective Coping  Goal: Participates in unit activities  Description  Interventions:  - Provide therapeutic environment   - Provide required programming   - Redirect inappropriate behaviors   Outcome: Progressing     Problem: DISCHARGE PLANNING - CARE MANAGEMENT  Goal: Discharge to post-acute care or home with appropriate resources  Description  INTERVENTIONS:  - Conduct assessment to determine patient/family and health care team treatment goals, and need for post-acute services based on payer coverage, community resources, and patient preferences, and barriers to discharge  - Address psychosocial, clinical, and financial barriers to discharge as identified in assessment in conjunction with the patient/family and health care team  - Arrange appropriate level of post-acute services according to patient's   needs and preference and payer coverage in collaboration with the physician and health care team  - Communicate with and update the patient/family, physician, and health care team regarding progress on the discharge plan  - Arrange appropriate transportation to post-acute venues   Outcome: Progressing

## 2019-03-04 NOTE — PROGRESS NOTES
Progress Note - Behavioral Health     Storm Geri 44 y o  female MRN: [id number]   Unit/Bed#: [facility] 251-01 Encounter: [id number]    Behavior over the last 24 hours: Isabel was seen for an inpatient follow-up psychiatric visit this date  Per nursing report, she has been out of her room for groups and is eating and sleeping appropriately  She is taking her medications as prescribed and denies any side effects  At today's visit, she relates she feels ready to be discharged but would like to speak with case management to discuss post discharge plans  She denies any suicidal or homicidal ideation as well as any auditory or visual hallucinations  She relates she feels anxious about leaving but otherwise offers no psychiatric complaints  ROS: no complaints    Mental Status Evaluation:    Appearance:  casually dressed, dressed appropriately   Behavior:  normal, pleasant, cooperative, calm   Speech:  normal rate and volume   Mood:  normal   Affect:  normal range and intensity   Thought Process:  organized, logical, coherent   Associations: intact associations   Thought Content:  normal, no overt delusions   Perceptual Disturbances: none   Risk Potential: Suicidal ideation - None  Homicidal ideation - None  Potential for aggression - No   Sensorium:  oriented to person, place and time/date   Memory:  recent and remote memory grossly intact   Consciousness:  alert and awake   Attention: attention span and concentration are age appropriate   Insight:  fair   Judgment: fair   Gait/Station: normal gait/station and normal balance   Motor Activity: no abnormal movements     Vital signs in last 24 hours:    Temp:  [98 9 °F (37 2 °C)-99 °F (37 2 °C)] 98 9 °F (37 2 °C)  HR:  [59-62] 59  Resp:  [16-18] 18  BP: (103-113)/(56-58) 103/56    Laboratory results:  I have personally reviewed all pertinent laboratory/tests results      Progress Toward Goals: progressing    Assessment/Plan   Principal Problem:    Severe episode of recurrent major depressive disorder, without psychotic features ([facility] )    Recommended Treatment:   Continue current medications as prescribed  Plan is for discharge tomorrow pending any change in patient's status  Disposition and planning are ongoing  All current active medications have been reviewed  Encourage group therapy, milieu therapy and occupational therapy  [facility] checks every 7 minutes      Current Facility-Administered Medications:  acetaminophen 650 mg Oral Q6H PRN Oksana N Lodics, CRNP   acetaminophen 650 mg Oral Q4H PRN Oksana N Lodics, CRNP   acetaminophen 975 mg Oral Q6H PRN Oksana RAMSEY Lodics, CRNP   aluminum-magnesium hydroxide-simethicone 30 mL Oral Q4H PRN Oksana Isidroics, CRNP   ARIPiprazole 12 5 mg Oral Daily Bao Beck MD   benztropine 1 mg Oral Q6H PRN Micah Flynn, CRNP   DULoxetine 60 mg Oral Daily Bao Beck MD   gabapentin 300 mg Oral BID Gera Backer, CRNP   gabapentin 600 mg Oral HS Gera Backer, CRNP   haloperidol 5 mg Oral Q6H PRN Oksana RAMSEY Lodics, CRNP   hydrOXYzine HCL 25 mg Oral Q6H PRN Oksana RAMSEY Lodics, CRNP   hydrOXYzine HCL 50 mg Oral Q4H PRN Oksana Isidroics, CRNP   hydrOXYzine HCL 50 mg Oral Q6H PRN Oksana Isidroics, CRNP   lithium carbonate 300 mg Oral Q12H MD Fidelia   magnesium hydroxide 30 mL Oral Daily PRN Oksana Isidroics, CRNP   melatonin 9 mg Oral HS Oksana Flynn, CRNP   OLANZapine 10 mg Intramuscular Q3H PRN Oksana RAMSEY Lodics, CRNP   risperiDONE 1 mg Oral Q3H PRN Oksana Isidroics, CRNP   traZODone 150 mg Oral HS SAMUEL Sanders       Risks / Benefits of Treatment:    Risks, benefits, and possible side effects of medications explained to patient and patient verbalizes understanding and agreement for treatment  Counseling / Coordination of Care:      Patient's progress discussed with staff in treatment team meeting  Medications, treatment progress and treatment plan reviewed with patient

## 2019-03-04 NOTE — SOCIAL WORK
CM met with PT for PT check in  PT rated her anxiety a 9/10 and depression a 6/10  PT indicated she is nervous about D/C planning at onset of discussion, but reflected being more at ease after discussing support services that will be in place  PT thanked LYUBOV for referrals  PT confirmed that she is to be D/C to the [facility] on file  PT denies SI/HI/AH/VH  Reviewed D/C planning, PT in agreement for D/C on Wednesday

## 2019-03-04 NOTE — PROGRESS NOTES
Pt WD to room this AM  Present for meals and meds  Affect is flat and depressed with poor eye contact  Pt does not have any plans for what she will do after DC  Denies SI, HI, A/T/V  Behaviors are controlled  Monitoring continues

## 2019-03-04 NOTE — SOCIAL WORK
CM placed call to [facility] with [facility] to follow up on referral sent for case mangement services, left message requesting return call

## 2019-03-05 PROBLEM — F41.0 PANIC ATTACKS: Chronic | Status: ACTIVE | Noted: 2019-03-05

## 2019-03-05 PROBLEM — F31.81 BIPOLAR II DISORDER (HCC): Status: ACTIVE | Noted: 2019-02-20

## 2019-03-05 PROCEDURE — 99233 SBSQ HOSP IP/OBS HIGH 50: CPT | Performed by: PSYCHIATRY & NEUROLOGY

## 2019-03-05 RX ORDER — ARIPIPRAZOLE 15 MG/1
7.5 TABLET ORAL DAILY
Status: DISCONTINUED | OUTPATIENT
Start: 2019-03-06 | End: 2019-03-06

## 2019-03-05 RX ORDER — DULOXETIN HYDROCHLORIDE 60 MG/1
60 CAPSULE, DELAYED RELEASE ORAL DAILY
Status: DISCONTINUED | OUTPATIENT
Start: 2019-03-06 | End: 2019-03-08 | Stop reason: HOSPADM

## 2019-03-05 RX ORDER — ARIPIPRAZOLE 10 MG/1
10 TABLET ORAL DAILY
Status: DISCONTINUED | OUTPATIENT
Start: 2019-03-06 | End: 2019-03-05

## 2019-03-05 RX ORDER — RISPERIDONE 1 MG/1
1 TABLET, FILM COATED ORAL
Status: DISCONTINUED | OUTPATIENT
Start: 2019-03-05 | End: 2019-03-07

## 2019-03-05 RX ORDER — AMOXICILLIN 250 MG
1 CAPSULE ORAL
Status: DISCONTINUED | OUTPATIENT
Start: 2019-03-05 | End: 2019-03-08 | Stop reason: HOSPADM

## 2019-03-05 RX ADMIN — GABAPENTIN 300 MG: 300 CAPSULE ORAL at 16:55

## 2019-03-05 RX ADMIN — MELATONIN TAB 3 MG 9 MG: 3 TAB at 21:10

## 2019-03-05 RX ADMIN — SENNOSIDES AND DOCUSATE SODIUM 1 TABLET: 8.6; 5 TABLET ORAL at 21:11

## 2019-03-05 RX ADMIN — GABAPENTIN 300 MG: 300 CAPSULE ORAL at 08:14

## 2019-03-05 RX ADMIN — TRAZODONE HYDROCHLORIDE 150 MG: 150 TABLET ORAL at 21:11

## 2019-03-05 RX ADMIN — DULOXETINE HYDROCHLORIDE 60 MG: 60 CAPSULE, DELAYED RELEASE ORAL at 08:13

## 2019-03-05 RX ADMIN — LITHIUM CARBONATE EXTENDED-RELEASE TABLET 300 MG: 300 TABLET, FILM COATED, EXTENDED RELEASE ORAL at 21:10

## 2019-03-05 RX ADMIN — ARIPIPRAZOLE 12.5 MG: 10 TABLET ORAL at 08:12

## 2019-03-05 RX ADMIN — RISPERIDONE 1 MG: 1 TABLET ORAL at 21:11

## 2019-03-05 RX ADMIN — LITHIUM CARBONATE EXTENDED-RELEASE TABLET 300 MG: 300 TABLET, FILM COATED, EXTENDED RELEASE ORAL at 08:14

## 2019-03-05 RX ADMIN — GABAPENTIN 600 MG: 300 CAPSULE ORAL at 21:09

## 2019-03-05 NOTE — PROGRESS NOTES
Patient bright alert for breakfast, hygiene appears improved this morning  Tells this nurse her depression has improved since the weekend but she still wishes her depression would decrease more before she gets discharged home  Depression is 6/10 and denies si hi and hallucinations at this time    Compliant with medications and care, will maintain on safety precautions and 7 minute checks, no needs identified,

## 2019-03-05 NOTE — SOCIAL WORK
LYUBOV placed call to [facility] to follow up on referral, spoke with Aixa Guevara whom indicated Aixa Guevara is out of the office and in another location to call her at   LYUBOV then called Keisha at number provided, left message requesting return call on Eleanor

## 2019-03-05 NOTE — PROGRESS NOTES
Progress Note - Behavioral Health     Ministerio Cross 44 y o  female MRN: [id number]   Unit/Bed#: [facility] 251-01 Encounter: [id number]    Behavior over the last 24 hours: unchanged  Sunshine struggles with panic feelings, and "not feeling normal" such that she cannot contemplate leaving the hospital without anxiety and unwell feeling  We reviewed her treatment here  It appears that she had been feeling better during a time when she had been receiving risperidone  After all, given her panic and grief it might be that a more calming medication, ie non-dopaminergic might be a better choice with which to augment her depressive treatment  Sleep: insomnia  Appetite: poor  Medication side effects: sedation/activation   ROS: no complaints    Mental Status Evaluation:    Appearance:  disheveled, poor eye contact   Behavior:  guarded   Speech:  slow, delayed, soft   Mood:  depressed, dysphoric   Affect:  constricted   Thought Process:  negative thinking   Associations: perseverative   Thought Content:  obsessions, negative thinking, negative thoughts   Perceptual Disturbances: none   Risk Potential: Suicidal ideation - Yes, passive death wish, when she feels panicky  Homicidal ideation - None  Potential for aggression - No   Sensorium:  oriented to person, place and time/date   Memory:  recent and remote memory grossly intact   Consciousness:  alert and awake   Attention: attention span and concentration are age appropriate   Insight:  partial   Judgment: partial   Gait/Station: normal gait/station   Motor Activity: no abnormal movements     Vital signs in last 24 hours:    Temp:  [97 9 °F (36 6 °C)-99 3 °F (37 4 °C)] 99 3 °F (37 4 °C)  HR:  [71-88] 71  Resp:  [16] 16  BP: (114-130)/(57-76) 114/57    Laboratory results:    I have personally reviewed all pertinent laboratory/tests results    Most Recent Labs:   Lab Results   Component Value Date    WBC 9 30 02/19/2019    RBC 4 44 02/19/2019    HGB 13 7 02/19/2019 HCT 40 9 02/19/2019     02/19/2019    RDW 14 1 02/19/2019    NEUTROABS 5 30 02/19/2019    SODIUM 137 02/19/2019    K 4 3 02/19/2019     02/19/2019    CO2 23 02/19/2019    BUN 13 02/19/2019    CREATININE 0 71 02/19/2019    GLUC 125 02/19/2019    CALCIUM 9 3 02/19/2019    AST 11 (L) 02/19/2019    ALT 11 02/19/2019    ALKPHOS 85 02/19/2019    TP 6 9 02/19/2019    ALB 4 3 02/19/2019    TBILI 0 20 02/19/2019    CHOLESTEROL 193 02/20/2019    HDL 28 (L) 02/20/2019    TRIG 323 (H) 02/20/2019    LDLCALC 100 02/20/2019    [place] 165 02/20/2019    [id number] 3 230 02/19/2019    RPR Non-Reactive 02/20/2019       Progress Toward Goals: Progress is stalled  Isabel felt that she was doing better several days ago  Now she is overwhelmed with panic and anxiety when she thinks about what happens after the hospital    Her initial disposition is not optimal due to relationship issues with the acquaintance who has offered her a place  On review of the chart, it looks like Isabel was feeling better after several nights of risperidone and Atarax  It could be that this is a better choice to augment her antidepressant treatment than aripiprazole, which is a dopamine partial agonist and might not be a good choice for someone with panic  Assessment/Plan   Principal Problem:    Bipolar II disorder (HCC)  Active Problems:    Panic attacks    Recommended Treatment:     Planned medication and treatment changes: All current active medications have been reviewed  Encourage group therapy, milieu therapy and occupational therapy  Behavioral Health checks every 7 minutes    Retrial risperidone and Atarax  Cross-taper aripiprazole  Ongoing discharge planning         Current Facility-Administered Medications:  acetaminophen 650 mg Oral Q6H PRN Oksana N Lodics, CRNP   acetaminophen 650 mg Oral Q4H PRN Oksana N Lodics, CRNP   acetaminophen 975 mg Oral Q6H PRN Oksana N Lodics, CRNP   aluminum-magnesium hydroxide-simethicone 30 mL Oral Q4H PRN Mar Flynn, CRNP   [START ON 3/6/2019] ARIPiprazole 7 5 mg Oral Daily Manas Glover MD   benztropine 1 mg Oral Q6H PRN Mar Flynn, CRNP   [START ON 3/6/2019] DULoxetine 60 mg Oral Daily Manas Glover MD   gabapentin 300 mg Oral BID Laura Smith, CRNP   gabapentin 600 mg Oral HS Elberta Smith, CRNP   haloperidol 5 mg Oral Q6H PRN Oksana Flynn, CRNP   hydrOXYzine HCL 25 mg Oral Q6H PRN Oksana Flynn, CRNP   hydrOXYzine HCL 50 mg Oral Q4H PRN Oksana Flynn, CRNP   hydrOXYzine HCL 50 mg Oral Q6H PRN Oksana Flynn, CRNP   lithium carbonate 300 mg Oral Q12H MD Fidelia   magnesium hydroxide 30 mL Oral Daily PRN Oksana Flynn, CRNP   melatonin 9 mg Oral HS Oksana Flynn, CRNP   OLANZapine 10 mg Intramuscular Q3H PRN Oksana Flynn, CRNP   risperiDONE 1 mg Oral Q3H PRN Oksana Flynn, CRNP   risperiDONE 1 mg Oral HS Manas Glover MD   senna-docusate sodium 1 tablet Oral HS Manas Glover MD   traZODone 150 mg Oral HS SMAUEL Sanders       Risks / Benefits of Treatment:    Risks, benefits, and possible side effects of medications explained to patient and patient verbalizes understanding and agreement for treatment  Counseling / Coordination of Care:    Patient's progress discussed with staff in treatment team meeting  Medications, treatment progress and treatment plan reviewed with patient      Manas Glover MD 03/05/19

## 2019-03-05 NOTE — PROGRESS NOTES
Patient has been visible on the unit  Attending and participating in evening unit activities  Social with peers  Continues to deny s/i  Reports she had a productive meeting with SW today and was pleased with the outcome  She again stated that she had been hoping to experience more relief from her depression before she would be considered for discharge  Encouraged patient to recognize how much progress she has made over the past two weeks

## 2019-03-06 PROCEDURE — 99232 SBSQ HOSP IP/OBS MODERATE 35: CPT | Performed by: PSYCHIATRY & NEUROLOGY

## 2019-03-06 RX ORDER — ARIPIPRAZOLE 5 MG/1
5 TABLET ORAL DAILY
Status: DISCONTINUED | OUTPATIENT
Start: 2019-03-07 | End: 2019-03-07

## 2019-03-06 RX ADMIN — LITHIUM CARBONATE EXTENDED-RELEASE TABLET 300 MG: 300 TABLET, FILM COATED, EXTENDED RELEASE ORAL at 21:12

## 2019-03-06 RX ADMIN — SENNOSIDES AND DOCUSATE SODIUM 1 TABLET: 8.6; 5 TABLET ORAL at 21:13

## 2019-03-06 RX ADMIN — ARIPIPRAZOLE 7.5 MG: 15 TABLET ORAL at 08:11

## 2019-03-06 RX ADMIN — GABAPENTIN 300 MG: 300 CAPSULE ORAL at 08:11

## 2019-03-06 RX ADMIN — GABAPENTIN 300 MG: 300 CAPSULE ORAL at 17:12

## 2019-03-06 RX ADMIN — GABAPENTIN 600 MG: 300 CAPSULE ORAL at 21:12

## 2019-03-06 RX ADMIN — RISPERIDONE 1 MG: 1 TABLET ORAL at 21:13

## 2019-03-06 RX ADMIN — DULOXETINE HYDROCHLORIDE 60 MG: 60 CAPSULE, DELAYED RELEASE ORAL at 08:11

## 2019-03-06 RX ADMIN — MELATONIN TAB 3 MG 9 MG: 3 TAB at 21:13

## 2019-03-06 RX ADMIN — LITHIUM CARBONATE EXTENDED-RELEASE TABLET 300 MG: 300 TABLET, FILM COATED, EXTENDED RELEASE ORAL at 08:11

## 2019-03-06 RX ADMIN — TRAZODONE HYDROCHLORIDE 150 MG: 150 TABLET ORAL at 21:13

## 2019-03-06 NOTE — PLAN OF CARE
Problem: Depression  Goal: Treatment Goal: Demonstrate behavioral control of depressive symptoms, verbalize feelings of improved mood/affect, and adopt new coping skills prior to discharge  Outcome: Progressing  Goal: Verbalize thoughts and feelings  Description  Interventions:  - Assess and re-assess patient's level of risk   - Engage patient in 1:1 interactions, daily, for a minimum of 15 minutes   - Encourage patient to express feelings, fears, frustrations, hopes   Outcome: Progressing     Problem: Anxiety  Goal: Anxiety is at manageable level  Description  Interventions:  - Assess and monitor patient's anxiety level  - Monitor for signs and symptoms of anxiety both physical and emotional (heart palpitations, chest pain, shortness of breath, headaches, nausea, feeling jumpy, restlessness, irritable, apprehensive)  - Collaborate with interdisciplinary team and initiate plan and interventions as ordered    - [place] patient to unit/surroundings  - Explain treatment plan  - Encourage participation in care  - Encourage verbalization of concerns/fears  - Identify coping mechanisms  - Assist in developing anxiety-reducing skills  - Administer/offer alternative therapies  - Limit or eliminate stimulants  Outcome: Progressing     Problem: Ineffective Coping  Goal: Participates in unit activities  Description  Interventions:  - Provide therapeutic environment   - Provide required programming   - Redirect inappropriate behaviors   Outcome: Progressing     Problem: DISCHARGE PLANNING - CARE MANAGEMENT  Goal: Discharge to post-acute care or home with appropriate resources  Description  INTERVENTIONS:  - Conduct assessment to determine patient/family and health care team treatment goals, and need for post-acute services based on payer coverage, community resources, and patient preferences, and barriers to discharge  - Address psychosocial, clinical, and financial barriers to discharge as identified in assessment in conjunction with the patient/family and health care team  - Arrange appropriate level of post-acute services according to patient's   needs and preference and payer coverage in collaboration with the physician and health care team  - Communicate with and update the patient/family, physician, and health care team regarding progress on the discharge plan  - Arrange appropriate transportation to post-acute venues   Outcome: Progressing     Problem: Nutrition/Hydration-ADULT  Goal: Nutrient/Hydration intake appropriate for improving, restoring or maintaining nutritional needs  Description  Monitor and assess patient's nutrition/hydration status for malnutrition (ex- brittle hair, bruises, dry skin, pale skin and conjunctiva, muscle wasting, smooth red tongue, and disorientation)  Collaborate with interdisciplinary team and initiate plan and interventions as ordered  Monitor patient's weight and dietary intake as ordered or per policy  Utilize nutrition screening tool and intervene per policy  Determine patient's food preferences and provide high-protein, high-caloric foods as appropriate  INTERVENTIONS:  - Monitor oral intake, urinary output, labs, and treatment plans  - Assess nutrition and hydration status and recommend course of action  - Evaluate amount of meals eaten  - Assist patient with eating if necessary   - Allow adequate time for meals  - Recommend/ encourage appropriate diets, oral nutritional supplements, and vitamin/mineral supplements  - Order, calculate, and assess calorie counts as needed  - Recommend, monitor, and adjust tube feedings and TPN/PPN based on assessed needs  - Assess need for intravenous fluids  - Provide specific nutrition/hydration education as appropriate  - Include patient/family/caregiver in decisions related to nutrition    she stated on rounds that her food has been good on regular with her intake 100  %  Her skin is intact  She is on haldol and MOM   Her weight was 191 on 2-23 then down to 188 on 3-2 which was a desirable weight loss  No new labs RDN will evaluate her labs when available    RDN to reassess her nutrition needs at low risk and follow PRN   Outcome: Progressing

## 2019-03-06 NOTE — PLAN OF CARE
PT continues to attend all of the art therapy groups provided and expressed that she able to get so many thoughts and feelings out of her hear during art therapy groups and that she is feeling ready and hopeful about D/C

## 2019-03-06 NOTE — PROGRESS NOTES
Progress Note - Behavioral Health     Nawaf Garcia 44 y o  female MRN: [id number]   Unit/Bed#: U 251-01 Encounter: [id number]    Behavior over the last 24 hours: improved  Sunshine is feeling a little bit better, less anxious, after risperidone last night  Sleep: improved  Appetite: increased  Medication side effects: No   ROS: no complaints    Mental Status Evaluation:    Appearance:  age appropriate   Behavior:  cooperative, calm   Speech:  normal rate, normal volume, normal pitch   Mood:  improved   Affect:  constricted   Thought Process:  organized   Associations: intact associations   Thought Content:  negative thoughts   Perceptual Disturbances: none   Risk Potential: Suicidal ideation - Yes, fleeting suicidal thoughts  Homicidal ideation - None  Potential for aggression - No   Sensorium:  oriented to person, place and time/date   Memory:  recent and remote memory grossly intact   Consciousness:  alert and awake   Attention: attention span and concentration are age appropriate   Insight:  partial   Judgment: partial   Gait/Station: normal gait/station   Motor Activity: no abnormal movements     Vital signs in last 24 hours:    Temp:  [97 9 °F (36 6 °C)-99 1 °F (37 3 °C)] 97 9 °F (36 6 °C)  HR:  [67-71] 71  Resp:  [16-18] 16  BP: ()/(50-60) 111/60    Laboratory results:    I have personally reviewed all pertinent laboratory/tests results    Most Recent Labs:   Lab Results   Component Value Date    WBC 9 30 02/19/2019    RBC 4 44 02/19/2019    HGB 13 7 02/19/2019    HCT 40 9 02/19/2019     02/19/2019    RDW 14 1 02/19/2019    NEUTROABS 5 30 02/19/2019    SODIUM 137 02/19/2019    K 4 3 02/19/2019     02/19/2019    CO2 23 02/19/2019    BUN 13 02/19/2019    CREATININE 0 71 02/19/2019    GLUC 125 02/19/2019    CALCIUM 9 3 02/19/2019    AST 11 (L) 02/19/2019    ALT 11 02/19/2019    ALKPHOS 85 02/19/2019    TP 6 9 02/19/2019    ALB 4 3 02/19/2019    TBILI 0 20 02/19/2019    CHOLESTEROL 193 02/20/2019    HDL 28 (L) 02/20/2019    TRIG 323 (H) 02/20/2019    LDLCALC 100 02/20/2019    [place] 165 02/20/2019    [id number] 3 230 02/19/2019    RPR Non-Reactive 02/20/2019       Progress Toward Goals: improving    Assessment/Plan   Principal Problem:    Bipolar II disorder (HCC)  Active Problems:    Panic attacks    Recommended Treatment:     Planned medication and treatment changes: All current active medications have been reviewed  Encourage group therapy, milieu therapy and occupational therapy  Behavioral Health checks every 7 minutes    Continue current treatment  Decrease aripiprazole as tolerated       Current Facility-Administered Medications:  acetaminophen 650 mg Oral Q6H PRN Oksana RAMSEY Lodics, CRNP   acetaminophen 650 mg Oral Q4H PRN Oksana Isidroics, CRNP   acetaminophen 975 mg Oral Q6H PRN Oksana Isidroics, CRNP   aluminum-magnesium hydroxide-simethicone 30 mL Oral Q4H PRN Mar Isidroics, CRNP   [START ON 3/7/2019] ARIPiprazole 5 mg Oral Daily Manas Glover MD   benztropine 1 mg Oral Q6H PRN Mar Flynn, CRNP   DULoxetine 60 mg Oral Daily Maans Glover MD   gabapentin 300 mg Oral BID [place], CRNP   gabapentin 600 mg Oral HS [place], CRNP   haloperidol 5 mg Oral Q6H PRN Oksana Isidroics, CRNP   hydrOXYzine HCL 25 mg Oral Q6H PRN Oksana Isidroics, CRNP   hydrOXYzine HCL 50 mg Oral Q4H PRN Oksana Isidroics, CRNP   hydrOXYzine HCL 50 mg Oral Q6H PRN Oksana Isidroics, CRNP   lithium carbonate 300 mg Oral Q12H MD Fidelia   magnesium hydroxide 30 mL Oral Daily PRN Oksana Isidroics, CRNP   melatonin 9 mg Oral HS Oksana Isidroics, CRNP   OLANZapine 10 mg Intramuscular Q3H PRN Oksana Isidroics, CRNP   risperiDONE 1 mg Oral Q3H PRN Oksana Isidroics, CRNP   risperiDONE 1 mg Oral HS Manas Glover MD   senna-docusate sodium 1 tablet Oral HS Manas Glover MD   traZODone 150 mg Oral HS SAMUEL Sanders       Risks / Benefits of Treatment:    Risks, benefits, and possible side effects of medications explained to patient and patient verbalizes understanding and agreement for treatment  Counseling / Coordination of Care:    Patient's progress discussed with staff in treatment team meeting  Medications, treatment progress and treatment plan reviewed with patient      Verona Bence, MD 03/06/19

## 2019-03-06 NOTE — PROGRESS NOTES
Patient out in the milieu social with peers and staff  Upon approach patient's mood and affect is constricted and depressed  Patient stated her depression and anxiety are decreasing  "Diffiently feeling better today!" Denies SI/HI/pain/hallucinations  Took medications without difficulty  No behavioral issues  Will continue to monitor safety and behaviors every 7 minutes

## 2019-03-06 NOTE — SOCIAL WORK
CM met with PT, reviewed TX plan, PT in agreement, PT signed  PT and Cm discussed D/C planning  Pt reflected on barriers last planned D/C feelings and emotions in relation to  CM and PT talked about upcoming D/C for Friday  PT indicated that she feels she is ready  PT rated anxiety a 8/10 and depression 5/10  Reviewed upcoming appointments and supports for PT  PT denies SI/HI/AH/VH

## 2019-03-07 PROCEDURE — 99232 SBSQ HOSP IP/OBS MODERATE 35: CPT | Performed by: PSYCHIATRY & NEUROLOGY

## 2019-03-07 RX ORDER — ARIPIPRAZOLE 2 MG/1
2 TABLET ORAL DAILY
Status: DISCONTINUED | OUTPATIENT
Start: 2019-03-08 | End: 2019-03-08 | Stop reason: HOSPADM

## 2019-03-07 RX ADMIN — RISPERIDONE 1.5 MG: 1 TABLET ORAL at 21:09

## 2019-03-07 RX ADMIN — LITHIUM CARBONATE EXTENDED-RELEASE TABLET 300 MG: 300 TABLET, FILM COATED, EXTENDED RELEASE ORAL at 08:54

## 2019-03-07 RX ADMIN — SENNOSIDES AND DOCUSATE SODIUM 1 TABLET: 8.6; 5 TABLET ORAL at 21:09

## 2019-03-07 RX ADMIN — GABAPENTIN 300 MG: 300 CAPSULE ORAL at 08:54

## 2019-03-07 RX ADMIN — TRAZODONE HYDROCHLORIDE 150 MG: 150 TABLET ORAL at 21:10

## 2019-03-07 RX ADMIN — GABAPENTIN 300 MG: 300 CAPSULE ORAL at 16:10

## 2019-03-07 RX ADMIN — GABAPENTIN 600 MG: 300 CAPSULE ORAL at 21:10

## 2019-03-07 RX ADMIN — ARIPIPRAZOLE 5 MG: 5 TABLET ORAL at 08:55

## 2019-03-07 RX ADMIN — MELATONIN TAB 3 MG 9 MG: 3 TAB at 21:10

## 2019-03-07 RX ADMIN — LITHIUM CARBONATE EXTENDED-RELEASE TABLET 300 MG: 300 TABLET, FILM COATED, EXTENDED RELEASE ORAL at 21:09

## 2019-03-07 RX ADMIN — DULOXETINE HYDROCHLORIDE 60 MG: 60 CAPSULE, DELAYED RELEASE ORAL at 08:54

## 2019-03-07 NOTE — SOCIAL WORK
LYUBOV spoke with Almon Aschoff from [facility]  She is working on setting up [facility]  [phone], [facility]

## 2019-03-07 NOTE — PROGRESS NOTES
Progress Note - Behavioral Health     Ami Aguilera 44 y o  female MRN: [id number]   Unit/Bed#: U 251-01 Encounter: [id number]    Behavior over the last 24 hours: improved  Sunshine is feeling a little bit better about her nerves and continuing her treatment as an outpatient  Sleep: improved  Appetite: increased  Medication side effects: No   ROS: no complaints    Mental Status Evaluation:    Appearance:  casually dressed   Behavior:  cooperative   Speech:  normal rate, normal volume, normal pitch   Mood:  dysphoric, irritable   Affect:  constricted   Thought Process:  organized   Associations: intact associations   Thought Content:  negative thinking   Perceptual Disturbances: none   Risk Potential: Suicidal ideation - None  Homicidal ideation - None  Potential for aggression - No   Sensorium:  oriented to person, place and time/date   Memory:  recent and remote memory grossly intact   Consciousness:  alert and awake   Attention: attention span and concentration are age appropriate   Insight:  age appropriate   Judgment: age appropriate   Gait/Station: normal gait/station and normal balance   Motor Activity: no abnormal movements     Vital signs in last 24 hours:    Temp:  [97 9 °F (36 6 °C)-98 5 °F (36 9 °C)] 98 5 °F (36 9 °C)  HR:  [62-71] 62  Resp:  [16] 16  BP: ()/(44-70) 102/70    Laboratory results:    I have personally reviewed all pertinent laboratory/tests results    Most Recent Labs:   Lab Results   Component Value Date    WBC 9 30 02/19/2019    RBC 4 44 02/19/2019    HGB 13 7 02/19/2019    HCT 40 9 02/19/2019     02/19/2019    RDW 14 1 02/19/2019    NEUTROABS 5 30 02/19/2019    SODIUM 137 02/19/2019    K 4 3 02/19/2019     02/19/2019    CO2 23 02/19/2019    BUN 13 02/19/2019    CREATININE 0 71 02/19/2019    GLUC 125 02/19/2019    CALCIUM 9 3 02/19/2019    AST 11 (L) 02/19/2019    ALT 11 02/19/2019    ALKPHOS 85 02/19/2019    TP 6 9 02/19/2019    ALB 4 3 02/19/2019    TBILI 0 20 02/19/2019    CHOLESTEROL 193 02/20/2019    HDL 28 (L) 02/20/2019    TRIG 323 (H) 02/20/2019    LDLCALC 100 02/20/2019    [place] 165 02/20/2019    [id number] 3 230 02/19/2019    RPR Non-Reactive 02/20/2019       Progress Toward Goals: improving    Assessment/Plan   Principal Problem:    Bipolar II disorder (HCC)  Active Problems:    Panic attacks    Recommended Treatment:     Planned medication and treatment changes: All current active medications have been reviewed  Encourage group therapy, milieu therapy and occupational therapy  Behavioral Health checks every 7 minutes    Increase risperidone  Cross taper aripiprazole       Current Facility-Administered Medications:  acetaminophen 650 mg Oral Q6H PRN Oksana Flynn, CRNP   acetaminophen 650 mg Oral Q4H PRN Oksana Flynn, CRNP   acetaminophen 975 mg Oral Q6H PRN Oksana Flynn, CRNP   aluminum-magnesium hydroxide-simethicone 30 mL Oral Q4H PRN [place] Lilly Flynn, CRNP   [START ON 3/8/2019] ARIPiprazole 2 mg Oral Daily Adam Watkins MD   benztropine 1 mg Oral Q6H PRN Abel Flynn, CRNP   DULoxetine 60 mg Oral Daily Adam Watkins MD   gabapentin 300 mg Oral BID Lexus Lye, CRNP   gabapentin 600 mg Oral HS Lexus Lye, CRNP   haloperidol 5 mg Oral Q6H PRN Oksana Flynn, CRNP   hydrOXYzine HCL 25 mg Oral Q6H PRN Oksana Flynn, CRNP   hydrOXYzine HCL 50 mg Oral Q4H PRN Oksana Flynn, CRNP   hydrOXYzine HCL 50 mg Oral Q6H PRN Oksana Flynn, CRNP   lithium carbonate 300 mg Oral Q12H MD Fidelia   magnesium hydroxide 30 mL Oral Daily PRN Oksana Flynn, CRNP   melatonin 9 mg Oral HS Oksana Flynn, CRNP   OLANZapine 10 mg Intramuscular Q3H PRN Oksana Flynn, CRNP   risperiDONE 1 mg Oral Q3H PRN Oksana Flynn, CRNP   risperiDONE 1 5 mg Oral HS Adam Watkins MD   senna-docusate sodium 1 tablet Oral HS Adam Watkins MD   traZODone 150 mg Oral HS SAMUEL Sanders       Risks / Benefits of Treatment:    Risks, benefits, and possible side effects of medications explained to patient and patient verbalizes understanding and agreement for treatment  Counseling / Coordination of Care:    Patient's progress discussed with staff in treatment team meeting  Medications, treatment progress and treatment plan reviewed with patient      Bao Beck MD 03/07/19

## 2019-03-07 NOTE — PLAN OF CARE
Problem: Depression  Goal: Treatment Goal: Demonstrate behavioral control of depressive symptoms, verbalize feelings of improved mood/affect, and adopt new coping skills prior to discharge  Outcome: Progressing  Goal: Verbalize thoughts and feelings  Description  Interventions:  - Assess and re-assess patient's level of risk   - Engage patient in 1:1 interactions, daily, for a minimum of 15 minutes   - Encourage patient to express feelings, fears, frustrations, hopes   Outcome: Progressing     Problem: Anxiety  Goal: Anxiety is at manageable level  Description  Interventions:  - Assess and monitor patient's anxiety level  - Monitor for signs and symptoms of anxiety both physical and emotional (heart palpitations, chest pain, shortness of breath, headaches, nausea, feeling jumpy, restlessness, irritable, apprehensive)  - Collaborate with interdisciplinary team and initiate plan and interventions as ordered    - [place] patient to unit/surroundings  - Explain treatment plan  - Encourage participation in care  - Encourage verbalization of concerns/fears  - Identify coping mechanisms  - Assist in developing anxiety-reducing skills  - Administer/offer alternative therapies  - Limit or eliminate stimulants  Outcome: Progressing     Problem: Ineffective Coping  Goal: Participates in unit activities  Description  Interventions:  - Provide therapeutic environment   - Provide required programming   - Redirect inappropriate behaviors   Outcome: Progressing     Problem: DISCHARGE PLANNING - CARE MANAGEMENT  Goal: Discharge to post-acute care or home with appropriate resources  Description  INTERVENTIONS:  - Conduct assessment to determine patient/family and health care team treatment goals, and need for post-acute services based on payer coverage, community resources, and patient preferences, and barriers to discharge  - Address psychosocial, clinical, and financial barriers to discharge as identified in assessment in conjunction with the patient/family and health care team  - Arrange appropriate level of post-acute services according to patient's   needs and preference and payer coverage in collaboration with the physician and health care team  - Communicate with and update the patient/family, physician, and health care team regarding progress on the discharge plan  - Arrange appropriate transportation to post-acute venues   Outcome: Progressing     Problem: Nutrition/Hydration-ADULT  Goal: Nutrient/Hydration intake appropriate for improving, restoring or maintaining nutritional needs  Description  Monitor and assess patient's nutrition/hydration status for malnutrition (ex- brittle hair, bruises, dry skin, pale skin and conjunctiva, muscle wasting, smooth red tongue, and disorientation)  Collaborate with interdisciplinary team and initiate plan and interventions as ordered  Monitor patient's weight and dietary intake as ordered or per policy  Utilize nutrition screening tool and intervene per policy  Determine patient's food preferences and provide high-protein, high-caloric foods as appropriate  INTERVENTIONS:  - Monitor oral intake, urinary output, labs, and treatment plans  - Assess nutrition and hydration status and recommend course of action  - Evaluate amount of meals eaten  - Assist patient with eating if necessary   - Allow adequate time for meals  - Recommend/ encourage appropriate diets, oral nutritional supplements, and vitamin/mineral supplements  - Order, calculate, and assess calorie counts as needed  - Recommend, monitor, and adjust tube feedings and TPN/PPN based on assessed needs  - Assess need for intravenous fluids  - Provide specific nutrition/hydration education as appropriate  - Include patient/family/caregiver in decisions related to nutrition    she stated on rounds that her food has been good on regular with her intake 100  %  Her skin is intact  She is on haldol and MOM   Her weight was 191 on 2-23 then down to 188 on 3-2 which was a desirable weight loss  No new labs RDN will evaluate her labs when available  RDN to reassess her nutrition needs at low risk and follow PRN   Outcome: Progressing   Pt continues to report depressive symptoms  Remains flat and depressed on approach  Offers fleeting eye contact  Is often withdrawn to room and reading in bed   Nursing encourages increased involvement in community

## 2019-03-07 NOTE — PROGRESS NOTES
Clinical Pharmacy Note: [facility] Therapeutic Monitoring     Taty Huff is a 44 y o  female who presents with bipolar 2 disorder  Assessment/Plan:    Lithium indication: mood disorder  Isabel is currently taking 300 mg lithium carbonate extended release tablet twice daily  Lithium concentration is not drawn yet for this admission but is ordered for morning of 3/8/19 on Friday which is appropriate as patient will be at steady state  The pharmacist has checked for drug interactions, lithium levels, kidney function, thyroid function  YES    Pharmacy Recommendations: Follow up on Lithium level scheduled for Friday 3/8/19  Monitoring:    Monitor for renal and thyroid dysfunction, skin reactions (acne), weight gain, and diabetes insipidus  Certain medications increase lithium levels and should be avoided such as diuretics, NSAIDS (excluding sulindac), and ACE-I/ARBs  Medications such as theophylline and caffeine may decrease lithium levels  Patient should maintain consistent adequate hydration and consistent caffeine and sodium intake  Lithium:  No results found for: LITHIUM    Renal:  0   Lab Value Date/Time    BUN 13 02/19/2019 1047    CREATININE 0 71 02/19/2019 1047       Thyroid:  0   Lab Value Date/Time    [id number] 3 230 02/19/2019 1047       Weight:  Wt Readings from Last 5 Encounters:   03/02/19 85 3 kg (188 lb)   02/19/19 77 1 kg (170 lb)       Lithium Levels    Therapeutic lithium levels are 0 6-1 2 mmol/L, but target range may be 0 8-1 2 mmol/L for acute amaris  Levels above 1 5 mmol/L indicate toxicity, although toxicity may be associated with levels within therapeutic ranges based on symptoms  Mild GI discomfort and slight tremor are typical when initiating lithium, but if these symptoms do not resolve or any other signs of toxicity occur, assess lithium levels immediately       Toxicity    Signs of toxicity include: confusion, difficulty concentrating, vomiting, diarrhea, poor coordination, tremor, abnormal heart rhythm, seizures and coma  Pharmacy will continue to follow patient with team, thank you      Electronically signed by: Phyllis Houston, PharmD, Clinical Pharmacist - Psychiatry

## 2019-03-07 NOTE — PROGRESS NOTES
Pt is visible on the unit  Social with select peers and walks in the halls with peers  Pt has poor eye contact when answering questions about herself  Good eye contact when speaking about other things  Pt states she will go back to staying with a friend and will work on getting her own place  Pt denies SI/HI/AH/VH  Pt states she felt better today  Able to make needs known  Will continue to monitor

## 2019-03-07 NOTE — PROGRESS NOTES
Presents for assessment clean and kempt  Offers fleeting eye contact  Flat, depressed affect but does brighten at times during assessment  Pt reports her discharge is planned for tomorrow and she feels ok about it  Pt denies any current or recent SI  Reports she is feeling good about her recent medication change from abilify to risperdal  Pt ultimately decided she needs to return to the friend's home she came from, but it will only be temporary until follow up appointments can scheduled  Pt offers good insight  Is thankful for care  Agrees to make needs known   Will continue to monitor

## 2019-03-08 VITALS
HEIGHT: 62 IN | DIASTOLIC BLOOD PRESSURE: 80 MMHG | HEART RATE: 66 BPM | BODY MASS INDEX: 34.6 KG/M2 | OXYGEN SATURATION: 98 % | RESPIRATION RATE: 15 BRPM | SYSTOLIC BLOOD PRESSURE: 116 MMHG | TEMPERATURE: 98.5 F | WEIGHT: 188 LBS

## 2019-03-08 LAB — LITHIUM SERPL-SCNC: 0.5 MMOL/L (ref 1–1.2)

## 2019-03-08 PROCEDURE — 90686 IIV4 VACC NO PRSV 0.5 ML IM: CPT | Performed by: NURSE PRACTITIONER

## 2019-03-08 PROCEDURE — 99239 HOSP IP/OBS DSCHRG MGMT >30: CPT | Performed by: PSYCHIATRY & NEUROLOGY

## 2019-03-08 PROCEDURE — 80178 ASSAY OF LITHIUM: CPT | Performed by: NURSE PRACTITIONER

## 2019-03-08 RX ORDER — GABAPENTIN 300 MG/1
600 CAPSULE ORAL
Qty: 60 CAPSULE | Refills: 0 | Status: SHIPPED | OUTPATIENT
Start: 2019-03-08 | End: 2019-08-29 | Stop reason: HOSPADM

## 2019-03-08 RX ORDER — GABAPENTIN 300 MG/1
300 CAPSULE ORAL 2 TIMES DAILY
Qty: 60 CAPSULE | Refills: 0 | Status: SHIPPED | OUTPATIENT
Start: 2019-03-08 | End: 2019-08-29 | Stop reason: HOSPADM

## 2019-03-08 RX ORDER — LITHIUM CARBONATE 300 MG/1
300 TABLET, FILM COATED, EXTENDED RELEASE ORAL EVERY 12 HOURS SCHEDULED
Qty: 60 TABLET | Refills: 0 | Status: SHIPPED | OUTPATIENT
Start: 2019-03-08 | End: 2019-08-29 | Stop reason: HOSPADM

## 2019-03-08 RX ORDER — GABAPENTIN 300 MG/1
600 CAPSULE ORAL
Qty: 30 CAPSULE | Refills: 0 | Status: SHIPPED | OUTPATIENT
Start: 2019-03-08 | End: 2019-03-08

## 2019-03-08 RX ORDER — LANOLIN ALCOHOL/MO/W.PET/CERES
9 CREAM (GRAM) TOPICAL
Qty: 90 TABLET | Refills: 0 | Status: SHIPPED | OUTPATIENT
Start: 2019-03-08 | End: 2019-04-25

## 2019-03-08 RX ORDER — RISPERIDONE 0.5 MG/1
1.5 TABLET, FILM COATED ORAL
Qty: 45 TABLET | Refills: 0 | Status: SHIPPED | OUTPATIENT
Start: 2019-03-08 | End: 2019-08-29 | Stop reason: HOSPADM

## 2019-03-08 RX ORDER — HYDROXYZINE HYDROCHLORIDE 25 MG/1
25 TABLET, FILM COATED ORAL 2 TIMES DAILY PRN
Qty: 30 TABLET | Refills: 0 | Status: SHIPPED | OUTPATIENT
Start: 2019-03-08 | End: 2019-08-29 | Stop reason: HOSPADM

## 2019-03-08 RX ORDER — ARIPIPRAZOLE 2 MG/1
2 TABLET ORAL DAILY
Qty: 30 TABLET | Refills: 0 | Status: SHIPPED | OUTPATIENT
Start: 2019-03-09 | End: 2019-04-25

## 2019-03-08 RX ORDER — TRAZODONE HYDROCHLORIDE 150 MG/1
150 TABLET ORAL
Qty: 30 TABLET | Refills: 0 | Status: SHIPPED | OUTPATIENT
Start: 2019-03-08 | End: 2019-08-29 | Stop reason: HOSPADM

## 2019-03-08 RX ORDER — DULOXETIN HYDROCHLORIDE 60 MG/1
60 CAPSULE, DELAYED RELEASE ORAL DAILY
Qty: 30 CAPSULE | Refills: 0 | Status: ON HOLD | OUTPATIENT
Start: 2019-03-09 | End: 2019-08-29 | Stop reason: SDUPTHER

## 2019-03-08 RX ADMIN — GABAPENTIN 300 MG: 300 CAPSULE ORAL at 08:29

## 2019-03-08 RX ADMIN — HYDROXYZINE HYDROCHLORIDE 50 MG: 25 TABLET ORAL at 05:45

## 2019-03-08 RX ADMIN — INFLUENZA VIRUS VACCINE 0.5 ML: 15; 15; 15; 15 SUSPENSION INTRAMUSCULAR at 14:03

## 2019-03-08 RX ADMIN — LITHIUM CARBONATE EXTENDED-RELEASE TABLET 300 MG: 300 TABLET, FILM COATED, EXTENDED RELEASE ORAL at 08:29

## 2019-03-08 RX ADMIN — ARIPIPRAZOLE 2 MG: 2 TABLET ORAL at 08:29

## 2019-03-08 RX ADMIN — DULOXETINE HYDROCHLORIDE 60 MG: 60 CAPSULE, DELAYED RELEASE ORAL at 08:29

## 2019-03-08 NOTE — PROGRESS NOTES
Pt had inturrupted sleeping through out the night due to roommate moaning, talking and breathing heavy in her sleep  PRN atarax administered at 0545 for anxiety  No distress noted on visual assessments  Will continue to monitor

## 2019-03-08 NOTE — PROGRESS NOTES
Pt is due to discharge today  Floor staff reviewed all of pt belongings with the pt  PT verified that all belongings are present/accounted for as of this time  Below is a complete listing of pt belongings  9 pr pants   11 prs socks  8 pr under ware  3 bras  13 shirts  1 pr slippers   2 winter coat   2 pr sunglasses  1 black purse  1 water bottle   1 pr gloves   1 scarf   1 pr shoes  4 hoodies   art supplies   personal hygene products   2 lrg black duffle bag   2 cell phone and   1 [place] drivers license  1 [place] access card   2 SS Card   1 [employer] Cross card   1 Visa Card   $4 55 in change   1 lighter   1 set of matches   1 pack of cigarettes with 17 cigarettes

## 2019-03-08 NOTE — DISCHARGE SUMMARY
Discharge Summary - [address] [facility] 44 y o  female MRN: [id number]  Unit/Bed#: -01 Encounter: [id number]     Admission Date:   Admission Orders (From admission, onward)    Ordered        02/19/19 1957  DISCHARGE READMIT Admit Patient to [address] [place] [address] (use with Discharge Readmit Navigator in [facility] Discharge Readmit scenario including from any IP unit or different campus ED to [facility])  Nurse to release order when pt  arrives to [address] [place] [address]  Once     Order ID Start Status   [id number] 02/19/19 1958 Completed                    Discharge Date: 03/08/2019  Attending Psychiatrist: Elsie Stevenson MD    Reason for Admission/HPI:   History of Present Illness    Earl Agustin is a 81-year-old female patient admitted on a voluntary 201 commitment basis to the [address] [place] [address] unit due to depression with suicidal ideation  According to the initial emergency room intake completed by Dr Gonzales Current had stopped taking her medications for approximately 2 weeks and became suicidal and depressed  Per initial psychiatric intake completed by Dr Elsie Stevenson, "On evaluation after admission in the inpatient psychiatric unit Isabel reported she has a longstanding history of psychiatric illness since his teenage years  She has been treated for major depressive disorder however admits that she has often been noncompliant with treatment  Patient reports over the last 2 weeks she has been feeling overwhelmingly more depressed  She reports decreased energy and decreased motivation, poor sleep " Isabel has a history of major depressive disorder and has been admitted on an inpatient basis 3 times since her teenage years  She has received outpatient psychiatric treatment but admits to noncompliance with follow-up  She has been trialed on several different psychotropic medications      Hospital Course:   Earl Agustin was admitted to the [address] [place] [address] unit after being medically cleared  Once on the unit, she was seen by medical doctor for physical examination  She was placed on 7 minutes behavioral health checks for patient safety  She was encouraged to attend both group and occupational therapy  Psychiatric medications were initiated and titrated to appropriate dosages  Before any medication was administered, risk versus benefit was discussed  Isabel agreed to take medications as prescribed and participate in psychiatric treatment  Initially after admission, she remained extremely depressed and withdrawn  She slept a lot of the time and did not really interact with anyone on the unit  After having her medications adjusted, and being exposed to therapeutic milieu of the unit, her symptoms began to resolve  She was no longer anxious, for family depressed and suicidal   Her appetite and sleep returned to normal and she began to interact in groups  Because she was doing so much better, the Psychiatric Care Team felt it would be both safe and appropriate to discharge her to care on an outpatient basis  She will be following up with KELSEY  Appointments were scheduled by unit case management team   On the day of discharge, Isabel denied any suicidal or homicidal ideation as well as any auditory visual hallucinations  She was psychiatrically stable and looking forward to leaving the hospital     Discharge medications are as follows:    Abilify 2 mg daily  Cymbalta 60 mg daily  Gabapentin 300 mg twice daily  Gabapentin 600 mg q h s   100 mg every 12 hours  Melatonin 9 mg HS  Risperidone 1 mg HS  Trazodone 150 mg HS      Mental Status at time of Discharge:     Appearance:  casually dressed   Behavior:  normal   Speech:  normal pitch and normal volume   Mood:  normal   Affect:  normal   Thought Process:  normal   Thought Content:  normal   Perceptual Disturbances: None   Risk Potential: Patient denies any suicidal or homicidal ideations     Sensorium:  person, place, time/date and situation   Cognition:  grossly intact   Consciousness:  alert and awake    Attention: attention span and concentration were age appropriate   Insight:  age appropriate   Judgment: age appropriate   Gait/Station: normal gait/station and normal balance   Motor Activity: no abnormal movements       Discharge Diagnosis:   Principal Problem:    Bipolar II disorder ([facility] )  Active Problems:    Panic attacks        Resolved Problems  Date Reviewed: 3/7/2019    None              Discharge Medications:  See after visit summary for reconciled discharge medications provided to patient and family  Discharge instructions/Information to patient and family:   See after visit summary for information provided to patient and family  Provisions for Follow-Up Care:  See after visit summary for information related to follow-up care and any pertinent home health orders  Discharge Statement   I spent 35 minutes discharging the patient  This time was spent on the day of discharge  I had direct contact with the patient on the day of discharge  Additional documentation is required if more than 30 minutes were spent on discharge  Importance of psychiatric follow-up and medication compliance was discussed  Isabel was in agreement and verbalized understanding

## 2019-03-08 NOTE — PROGRESS NOTES
Pt is visible on the unit  Social with select peers and walks during the evening while talking  Improved eye contact  Pt states she is ready to leave and move on with her life  Denies SI/HI/AH/VH  Able to make needs known  Will continue to monitor

## 2019-03-08 NOTE — PLAN OF CARE
Problem: Ineffective Coping  Goal: Participates in unit activities  Description  Interventions:  - Provide therapeutic environment   - Provide required programming   - Redirect inappropriate behaviors   Outcome: Adequate for Discharge

## 2019-03-08 NOTE — SOCIAL WORK
CM met with PT for PT check in  PT indicated that she is ready for D/C her friend Esther Renteria will be picking her up at 2:30pm today  PT indicated that she has some anxiety about transition but feels that she is ready  Reviewed appointments and referrals  PT indicated that depression has improved  PT inquired about medication and scripts and coverage  CM reached out to UR and he will follow up on this and ensure complete prior to PT D/C

## 2019-03-08 NOTE — PROGRESS NOTES
Pt remains calm and cooperative on the unit, pt denies any current si/hi at this time  Pt has fleeting eye contact during interviews  Pt denies any issues

## 2019-03-08 NOTE — DISCHARGE INSTR - ACTIVITY
Contact Information: If you have any questions, concerns, pended studies, tests and/or procedures, or emergencies regarding your inpatient behavioral health visit  Please contact Naif Mcintyre" [facility] behavioral health unit [phone] and ask to speak to a , nurse or physician  A contact is available 24 hours/ 7 days a week at this number  Summary of Procedures Performed During your Stay:  Below is a list of major procedures performed during your hospital stay and a summary of results:  - No major procedures performed  Pending Studies (From admission, onward)    None        If studies are pending at discharge, follow up with your PCP and/or referring provider

## 2019-03-08 NOTE — DISCHARGE INSTRUCTIONS
Anxiety, Ambulatory Care   GENERAL INFORMATION:   Anxiety  is a condition that causes you to feel excessive worry, uneasiness, or fear  Family or work stress, smoking, caffeine, and alcohol can increase your risk for anxiety  Certain medicines or health conditions can also increase your risk  Anxiety may begin gradually and can become a long-term condition if it is not managed or treated  Common symptoms include the following:   · Fatigue or muscle tightness     · Shaking, restlessness, or irritability     · Problems focusing     · Trouble sleeping     · Feeling jumpy, easily startled, or dizzy     · Rapid heartbeat or shortness of breath  Seek immediate care for the following symptoms:   · Chest pain, tightness, or heaviness that may spread to your shoulders, arms, jaw, neck, or back    · Feeling like hurting yourself or someone else    · Dizziness or feeling lightheaded or faint  Treatment for anxiety  may include medicines to help you feel calm and relaxed, and decrease your symptoms  Healthcare providers will treat any medical conditions that may be causing your symptoms  Manage anxiety:   · Go to counseling as directed  Cognitive behavioral therapy can help you understand and change how you react to events that trigger your symptoms  · Find ways to manage your symptoms  Activities such as exercise, meditation, or listening to music can help you relax  · Practice deep breathing  Breathing can change how your body reacts to stress  Focus on taking slow, deep breaths several times a day, or during an anxiety attack  Breathe in through your nose, and out through your mouth  · Avoid caffeine  Caffeine can make your symptoms worse  Avoid foods or drinks that are meant to increase your energy level  · Limit or avoid alcohol  Ask your healthcare provider if alcohol is safe for you  You may not be able to drink alcohol if you take certain anxiety or depression medicines   Limit alcohol to 1 drink per day if you are a woman  Limit alcohol to 2 drinks per day if you are a man  A drink of alcohol is 12 ounces of beer, 5 ounces of wine, or 1½ ounces of liquor  Follow up with your healthcare provider as directed:  Write down your questions so you remember to ask them during your visits  CARE AGREEMENT:   You have the right to help plan your care  Learn about your health condition and how it may be treated  Discuss treatment options with your caregivers to decide what care you want to receive  You always have the right to refuse treatment  The above information is an  only  It is not intended as medical advice for individual conditions or treatments  Talk to your doctor, nurse or pharmacist before following any medical regimen to see if it is safe and effective for you  © 2014 [address] is for End User's use only and may not be sold, redistributed or otherwise used for commercial purposes  All illustrations and images included in CareNotes® are the copyrighted property of A D A M , Inc  or [employer]  Hydroxyzine (By mouth)   Hydroxyzine Pamoate ([id number]-QDSY-z-zeen MCKENNA-oh-ate)  Treats anxiety, nausea, vomiting, allergies, skin rash, hives, and itching  May also be used with anesthesia for medical procedures  Brand Name(s): Vistaril   There may be other brand names for this medicine  When This Medicine Should Not Be Used: This medicine is not right for everyone  Do not use it if you had an allergic reaction to hydroxyzine, cetirizine, or levocetirizine  Do not use it during the early part of pregnancy or if you have prolonged QT interval   How to Use This Medicine:   Capsule, Liquid, Tablet  · Your doctor will tell you how much medicine to use  Do not use more than directed  · Oral liquid: Measure the oral liquid medicine with a marked measuring spoon, oral syringe, or medicine cup  Shake well just before use  · Tablet: Swallow whole   Do not break, crush, or chew it  · Missed dose: Take a dose as soon as you remember  If it is almost time for your next dose, wait until then and take a regular dose  Do not take extra medicine to make up for a missed dose  · Store the medicine in a closed container at room temperature, away from heat, moisture, and direct light  Drugs and Foods to Avoid:   Ask your doctor or pharmacist before using any other medicine, including over-the-counter medicines, vitamins, and herbal products  · Some medicines can affect how hydroxyzine works  Tell your doctor if you are using any of the following:  ¨ Droperidol, methadone, ondansetron, pentamidine  ¨ Antibiotic (including azithromycin, clarithromycin, erythromycin, gatifloxacin, moxifloxacin)  ¨ Medicine to treat depression (including citalopram, fluoxetine)  ¨ Medicine to treat heart rhythm problems (including amiodarone, procainamide, quinidine, sotalol)  ¨ Medicine to treat mental health problems (including chlorpromazine, clozapine, iloperidone, quetiapine, ziprasidone)  · Tell your doctor if you use anything else that makes you sleepy  Some examples are allergy medicine, narcotic pain medicine, and alcohol  Warnings While Using This Medicine:   · Tell your doctor if you are pregnant or breastfeeding, or if you have heart disease, heart failure, a slow heartbeat, skin problems, or had a recent heart attack  · This medicine may cause the following problems:  ¨ Changes in your heart rhythm  ¨ Serious skin reaction called acute generalized exanthematous pustulosis (AGEP)  · This medicine may make you drowsy  Do not drive or do anything that could be dangerous until you know how this medicine affects you  · Call your doctor if your symptoms do not improve or if they get worse  · Keep all medicine out of the reach of children  Never share your medicine with anyone    Possible Side Effects While Using This Medicine:   Call your doctor right away if you notice any of these side effects:  · Allergic reaction: Itching or hives, swelling in your face or hands, swelling or tingling in your mouth or throat, chest tightness, trouble breathing  · Fainting, dizziness, lightheadedness  · Fast, pounding, or uneven heartbeat  · Fever, skin rash  · Severe tiredness  If you notice these less serious side effects, talk with your doctor:   · Drowsiness, sleepiness  · Dry mouth  If you notice other side effects that you think are caused by this medicine, tell your doctor  Call your doctor for medical advice about side effects  You may report side effects to FDA at 6-929-FDA-3940  © 2017 [address] Information is for End User's use only and may not be sold, redistributed or otherwise used for commercial purposes  The above information is an  only  It is not intended as medical advice for individual conditions or treatments  Talk to your doctor, nurse or pharmacist before following any medical regimen to see if it is safe and effective for you

## 2019-03-14 NOTE — CASE MANAGEMENT
Called department of human services to check on the status of the prior auth request for patient's risperdone  They stated that they had just received the fax today and that no clinical was attached  It was stated that I received a confirmation email on 3/12 at [phone]    They directed me to refax it to gavino at [phone] with a reference number of [id number]

## 2019-04-25 ENCOUNTER — HOSPITAL ENCOUNTER (EMERGENCY)
Facility: HOSPITAL | Age: 44
Discharge: HOME | End: 2019-04-25
Attending: EMERGENCY MEDICINE | Admitting: EMERGENCY MEDICINE
Payer: COMMERCIAL

## 2019-04-25 VITALS
BODY MASS INDEX: 36.4 KG/M2 | DIASTOLIC BLOOD PRESSURE: 65 MMHG | OXYGEN SATURATION: 98 % | HEART RATE: 66 BPM | TEMPERATURE: 99 F | RESPIRATION RATE: 18 BRPM | SYSTOLIC BLOOD PRESSURE: 125 MMHG | WEIGHT: 199 LBS

## 2019-04-25 DIAGNOSIS — K08.89 PAIN, DENTAL: Primary | ICD-10-CM

## 2019-04-25 PROCEDURE — 99283 EMERGENCY DEPT VISIT LOW MDM: CPT

## 2019-04-25 PROCEDURE — 99283 EMERGENCY DEPT VISIT LOW MDM: CPT | Performed by: PHYSICIAN ASSISTANT

## 2019-04-25 RX ORDER — CLINDAMYCIN HYDROCHLORIDE 150 MG/1
300 CAPSULE ORAL 3 TIMES DAILY
Qty: 60 CAPSULE | Refills: 0 | Status: SHIPPED | OUTPATIENT
Start: 2019-04-25 | End: 2019-05-05

## 2019-04-25 RX ORDER — BENZTROPINE MESYLATE 0.5 MG/1
0.5 TABLET ORAL AS NEEDED
COMMUNITY
End: 2019-08-29 | Stop reason: HOSPADM

## 2019-04-28 ENCOUNTER — HOSPITAL ENCOUNTER (EMERGENCY)
Facility: HOSPITAL | Age: 44
Discharge: HOME | End: 2019-04-28
Attending: EMERGENCY MEDICINE
Payer: COMMERCIAL

## 2019-04-28 VITALS
HEART RATE: 73 BPM | OXYGEN SATURATION: 97 % | BODY MASS INDEX: 36.62 KG/M2 | HEIGHT: 62 IN | TEMPERATURE: 98.3 F | RESPIRATION RATE: 18 BRPM | DIASTOLIC BLOOD PRESSURE: 67 MMHG | WEIGHT: 199 LBS | SYSTOLIC BLOOD PRESSURE: 148 MMHG

## 2019-04-28 DIAGNOSIS — K08.89 PAIN, DENTAL: Primary | ICD-10-CM

## 2019-04-28 PROCEDURE — 64400 NJX AA&/STRD TRIGEMINAL NRV: CPT | Performed by: EMERGENCY MEDICINE

## 2019-04-28 PROCEDURE — NC001 PR NO CHARGE

## 2019-04-28 PROCEDURE — 99283 EMERGENCY DEPT VISIT LOW MDM: CPT | Performed by: EMERGENCY MEDICINE

## 2019-04-28 PROCEDURE — 99282 EMERGENCY DEPT VISIT SF MDM: CPT

## 2019-08-08 ENCOUNTER — HOSPITAL ENCOUNTER (EMERGENCY)
Facility: HOSPITAL | Age: 44
Discharge: LEFT BEFORE DISCHARGE | End: 2019-08-08
Attending: EMERGENCY MEDICINE
Payer: COMMERCIAL

## 2019-08-08 VITALS
BODY MASS INDEX: 36.29 KG/M2 | SYSTOLIC BLOOD PRESSURE: 133 MMHG | TEMPERATURE: 98.2 F | OXYGEN SATURATION: 96 % | DIASTOLIC BLOOD PRESSURE: 76 MMHG | HEIGHT: 61 IN | WEIGHT: 192.2 LBS | HEART RATE: 102 BPM | RESPIRATION RATE: 18 BRPM
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
## 2019-08-08 PROCEDURE — 99281 EMR DPT VST MAYX REQ PHY/QHP: CPT | Performed by: EMERGENCY MEDICINE

## 2019-08-08 PROCEDURE — 99284 EMERGENCY DEPT VISIT MOD MDM: CPT
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
## 2019-08-13 ENCOUNTER — HOSPITAL ENCOUNTER (EMERGENCY)
Facility: HOSPITAL | Age: 44
Discharge: HOME | End: 2019-08-14
Attending: EMERGENCY MEDICINE | Admitting: EMERGENCY MEDICINE
Payer: COMMERCIAL

## 2019-08-13 DIAGNOSIS — Z00.8 ENCOUNTER FOR PSYCHOLOGICAL EVALUATION: Primary | ICD-10-CM

## 2019-08-13 DIAGNOSIS — F10.929 ALCOHOL INTOXICATION (HCC): ICD-10-CM

## 2019-08-13 LAB — ETHANOL EXG-MCNC: 0.33 MG/DL

## 2019-08-13 PROCEDURE — 80307 DRUG TEST PRSMV CHEM ANLYZR: CPT | Performed by: EMERGENCY MEDICINE

## 2019-08-13 PROCEDURE — 82075 ASSAY OF BREATH ETHANOL: CPT | Performed by: EMERGENCY MEDICINE

## 2019-08-13 PROCEDURE — 99284 EMERGENCY DEPT VISIT MOD MDM: CPT

## 2019-08-13 PROCEDURE — 96372 THER/PROPH/DIAG INJ SC/IM: CPT

## 2019-08-13 PROCEDURE — 99283 EMERGENCY DEPT VISIT LOW MDM: CPT | Performed by: EMERGENCY MEDICINE

## 2019-08-13 PROCEDURE — 81025 URINE PREGNANCY TEST: CPT | Performed by: EMERGENCY MEDICINE

## 2019-08-13 RX ORDER — LORAZEPAM 2 MG/ML
2 INJECTION INTRAMUSCULAR ONCE
Status: COMPLETED | OUTPATIENT
Start: 2019-08-13 | End: 2019-08-13

## 2019-08-13 RX ORDER — OLANZAPINE 10 MG/1
10 INJECTION, POWDER, LYOPHILIZED, FOR SOLUTION INTRAMUSCULAR ONCE
Status: COMPLETED | OUTPATIENT
Start: 2019-08-13 | End: 2019-08-13

## 2019-08-13 RX ADMIN — OLANZAPINE 10 MG: 10 INJECTION, POWDER, FOR SOLUTION INTRAMUSCULAR at 18:57

## 2019-08-13 RX ADMIN — WATER: 1 INJECTION INTRAMUSCULAR; INTRAVENOUS; SUBCUTANEOUS at 18:57

## 2019-08-13 RX ADMIN — LORAZEPAM 2 MG: 2 INJECTION INTRAMUSCULAR; INTRAVENOUS at 18:57
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
## 2019-08-14 VITALS
HEART RATE: 99 BPM | WEIGHT: 187.17 LBS | SYSTOLIC BLOOD PRESSURE: 150 MMHG | DIASTOLIC BLOOD PRESSURE: 70 MMHG | OXYGEN SATURATION: 98 % | RESPIRATION RATE: 17 BRPM | TEMPERATURE: 98 F | BODY MASS INDEX: 35.37 KG/M2

## 2019-08-14 LAB
AMPHETAMINES SERPL QL SCN: NEGATIVE
BARBITURATES UR QL: NEGATIVE
BENZODIAZ UR QL: NEGATIVE
COCAINE UR QL: NEGATIVE
ETHANOL EXG-MCNC: 0 MG/DL
EXT PREG TEST URINE: NEGATIVE
EXT. CONTROL ED NAV: NORMAL
METHADONE UR QL: NEGATIVE
OPIATES UR QL SCN: NEGATIVE
PCP UR QL: NEGATIVE
THC UR QL: NEGATIVE

## 2019-08-14 PROCEDURE — 82075 ASSAY OF BREATH ETHANOL: CPT | Performed by: EMERGENCY MEDICINE

## 2019-08-14 RX ORDER — TRAZODONE HYDROCHLORIDE 50 MG/1
150 TABLET ORAL ONCE
Status: COMPLETED | OUTPATIENT
Start: 2019-08-14 | End: 2019-08-14

## 2019-08-14 RX ORDER — LANOLIN ALCOHOL/MO/W.PET/CERES
6 CREAM (GRAM) TOPICAL
Status: DISCONTINUED | OUTPATIENT
Start: 2019-08-14 | End: 2019-08-14 | Stop reason: HOSPADM

## 2019-08-14 RX ADMIN — MELATONIN 6 MG: 3 TAB ORAL at 01:29

## 2019-08-14 RX ADMIN — TRAZODONE HYDROCHLORIDE 150 MG: 50 TABLET ORAL at 00:00

## 2019-08-14 NOTE — DISCHARGE INSTRUCTIONS
Alcohol Intoxication   WHAT YOU NEED TO KNOW:   Alcohol intoxication is a harmful physical condition caused when you drink more alcohol than your body can handle  It is also called ethanol poisoning, or being drunk  DISCHARGE INSTRUCTIONS:   Medicine: You may be given medicine to manage the signs and symptoms of alcohol intoxication  Take your medicine as directed  Contact your healthcare provider if you think your medicine is not helping or if you have side effects  Tell him if you are allergic to any medicine  Keep a list of the medicines, vitamins, and herbs you take  Include the amounts, and when and why you take them  Bring the list or the pill bottles to follow-up visits  Keep the list with you in case of emergency  Follow up with your healthcare provider as directed:  Write down your questions so you remember to ask them during your visits  Limit or avoid alcohol:  Men should not have more than 2 drinks per day  Women should not have more than 1 drink per day  A drink is 12 ounces of beer, 5 ounces of wine, or 1½ ounces of liquor  Do not drive or operate machines when you drink alcohol:  Make sure you always have someone to drive you when you drink alcohol  Learn ways to manage stress  Deep breathing, meditation, and listening to music may help you cope with stressful events  Talk to your caregiver about other ways to manage stress  For more information:   · Alcoholics Anonymous  Web Address: http://www [employer]/  Contact your healthcare provider if:   · You need help to stop drinking alcohol  · You have trouble with work or school because you drink too much alcohol  · You have physical or verbal fights because of alcohol  · You have questions or concerns about your condition or care  Seek care immediately or call 911 if:   · You have sudden trouble breathing or chest pain  · You have a seizure  · You feel sad enough to harm yourself or others      · You have hallucinations (you see or hear things that are not real)  · You cannot stop vomiting  · You were in an accident because of alcohol  © 2017 [employer]  Information is for End User's use only and may not be sold, redistributed or otherwise used for commercial purposes  All illustrations and images included in CareNotes® are the copyrighted property of [employer]  or [employer]  The above information is an  only  It is not intended as medical advice for individual conditions or treatments  Talk to your doctor, nurse or pharmacist before following any medical regimen to see if it is safe and effective for you  Depression   WHAT YOU NEED TO KNOW:   Depression is a medical condition that causes feelings of sadness or hopelessness that do not go away  Depression may cause you to lose interest in things you used to enjoy  These feelings may interfere with your daily life  DISCHARGE INSTRUCTIONS:   Call 911 for any of the following:   · You think about harming yourself or someone else  Contact your healthcare provider if:   · Your symptoms do not improve  · You cannot make it to your next appointment  · You have new symptoms  · You have questions or concerns about your condition or care  Medicines:   · Antidepressants  may be given to improve or balance your mood  You may need to take this medicine for several weeks before you begin to feel better  · Take your medicine as directed  Contact your healthcare provider if you think your medicine is not helping or if you have side effects  Tell him of her if you are allergic to any medicine  Keep a list of the medicines, vitamins, and herbs you take  Include the amounts, and when and why you take them  Bring the list or the pill bottles to follow-up visits  Carry your medicine list with you in case of an emergency  Therapy  may be used to treat your depression   A therapist will help you learn to cope with your thoughts and feelings  This can be done alone or in a group  It may also be done with family members or a significant other  Self-care:   · Get regular physical activity  Try to exercise for 30 minutes, 3 to 5 days a week  Work with your healthcare provider to develop an exercise plan that you enjoy  Physical activity may improve your symptoms  · Get enough sleep  Create a routine to help you relax before bed  You can listen to music, read, or do yoga  Try to go to bed and wake up at the same time every day  Sleep is important for emotional health  · Eat a variety of healthy foods from all of the food groups  A healthy meal plan is low in fat, salt, and added sugar  Ask your healthcare provider for more information about a meal plan that is right for you  · Do not drink alcohol or use drugs  Alcohol and drugs can make your symptoms worse  Follow up with your healthcare provider as directed: Your healthcare provider will monitor your progress at follow-up visits  He or she will also monitor your medicine if you take antidepressants  Your healthcare provider will ask if the medicine is helping  Tell him or her about any side effects or problems you may have with your medicine  The type or amount of medicine may need to be changed  Write down your questions so you remember to ask them during your visits  © 2017 [facility] [facility] Information is for End User's use only and may not be sold, redistributed or otherwise used for commercial purposes  All illustrations and images included in CareNotes® are the copyrighted property of [employer]  or Raud[employer] Oliveira  The above information is an  only  It is not intended as medical advice for individual conditions or treatments  Talk to your doctor, nurse or pharmacist before following any medical regimen to see if it is safe and effective for you

## 2019-08-14 NOTE — ED NOTES
Call placed to [facility] in [place] and spoke with Director Barbara Connell ([phone]) who stated that they have an 8 bed facility, although it is currently full   Per Willie Azar, they have very little turnover as it's a 2 year residential program, and have 4-5 other women on the wait list      Dorothy Peng LMSW  08/14/19  3594

## 2019-08-14 NOTE — ED NOTES
Pt medications, personal belongings and items from safe returned at this time       Juan Francisco Paulson RN  08/14/19 7241

## 2019-08-14 NOTE — ED NOTES
[facility] discussed options with pt  Pt reports that she called [facility] and is waiting for a call back  CIS presented the option of I/P which would require signing a voluntary admission 201 form  Pt states she will consider I/P if unable to locate a facility  Provider informed [facility] that pt was unsuccessful in finding a placement and has decided to sign herself out

## 2019-08-14 NOTE — ED NOTES
As of 17:37 pt's BAT registered at 0 334, therefore pt could not be evaluated by Crisis this shift      Pt to be seen by Crisis when sober in the [address] [facility], [facility], [facility]  08/13/19 23:54

## 2019-08-14 NOTE — ED NOTES
Call placed to [facility], spoke with Levi hurd  Transferred call to patient for phone interview      Oneal Frederick LMSW  08/14/19 2039
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
## 2019-08-14 NOTE — ED NOTES
Discussed inpatient tx with the patient, as she is visibly upset and depressed over the situation  Patient feels completely hopeless at this time, stating she has been trying all day to come up with a solution  Patient wishes to make one more phone call to a facility provided to her by Zana Corcoran Arm, LMSW  08/14/19  9103

## 2019-08-14 NOTE — ED NOTES
Patient spoke with Mis Castaneda at [facility], who stated that they were unable to assist at this time, and requested that she contact [employer]  Patient indicated that she sat on hold with [employer] for well over an hour, and due to frustration, hung up  Patient then called her employer, who did not feel comfortable providing her other employees' phone numbers  Patient was provided the emergency shelter list and recommended to call local facilities for availability  Patient also encouraged to call [employer] back      Oneal Frederick LMSW  08/14/19  9546

## 2019-08-14 NOTE — ED NOTES
Pt requesting to leave the unit to smoke a cigarette  Nicotine patch offered to pt, and pt was advsd she could not leave the unit to smoke  Pt became very agitated, threatening staff and attempting to leave the hospital  Security and Provider made aware       Jesus Coffman RN  08/13/19 2101 Josey Henderson RN  08/13/19 2026

## 2019-08-15 ENCOUNTER — HOSPITAL ENCOUNTER (EMERGENCY)
Facility: HOSPITAL | Age: 44
End: 2019-08-16
Attending: EMERGENCY MEDICINE | Admitting: EMERGENCY MEDICINE
Payer: COMMERCIAL

## 2019-08-15 DIAGNOSIS — F32.A DEPRESSION: ICD-10-CM

## 2019-08-15 DIAGNOSIS — R45.851 SUICIDAL IDEATIONS: ICD-10-CM

## 2019-08-15 DIAGNOSIS — F31.81 BIPOLAR II DISORDER (HCC): Primary | ICD-10-CM

## 2019-08-15 LAB
AMPHETAMINES SERPL QL SCN: NEGATIVE
ANION GAP SERPL CALCULATED.3IONS-SCNC: 14 MMOL/L (ref 4–13)
ATRIAL RATE: 116 BPM
BACTERIA UR QL AUTO: ABNORMAL /HPF
BARBITURATES UR QL: NEGATIVE
BASOPHILS # BLD AUTO: 0.04 THOUSANDS/ΜL (ref 0–0.1)
BASOPHILS NFR BLD AUTO: 0 % (ref 0–1)
BENZODIAZ UR QL: NEGATIVE
BILIRUB UR QL STRIP: NEGATIVE
BUN SERPL-MCNC: 14 MG/DL (ref 5–25)
CALCIUM SERPL-MCNC: 9.9 MG/DL (ref 8.3–10.1)
CHLORIDE SERPL-SCNC: 99 MMOL/L (ref 100–108)
CLARITY UR: CLEAR
CO2 SERPL-SCNC: 25 MMOL/L (ref 21–32)
COCAINE UR QL: NEGATIVE
COLOR UR: YELLOW
CREAT SERPL-MCNC: 0.92 MG/DL (ref 0.6–1.3)
EOSINOPHIL # BLD AUTO: 0.32 THOUSAND/ΜL (ref 0–0.61)
EOSINOPHIL NFR BLD AUTO: 3 % (ref 0–6)
ERYTHROCYTE [DISTWIDTH] IN BLOOD BY AUTOMATED COUNT: 15 % (ref 11.6–15.1)
EXT PREG TEST URINE: NEGATIVE
EXT PREG TEST URINE: NEGATIVE
EXT. CONTROL ED NAV: NORMAL
EXT. CONTROL ED NAV: NORMAL
GFR SERPL CREATININE-BSD FRML MDRD: 77 ML/MIN/1.73SQ M
GLUCOSE SERPL-MCNC: 124 MG/DL (ref 65–140)
GLUCOSE UR STRIP-MCNC: NEGATIVE MG/DL
HCT VFR BLD AUTO: 45.5 % (ref 34.8–46.1)
HGB BLD-MCNC: 15.2 G/DL (ref 11.5–15.4)
HGB UR QL STRIP.AUTO: ABNORMAL
IMM GRANULOCYTES # BLD AUTO: 0.03 THOUSAND/UL (ref 0–0.2)
IMM GRANULOCYTES NFR BLD AUTO: 0 % (ref 0–2)
KETONES UR STRIP-MCNC: NEGATIVE MG/DL
LEUKOCYTE ESTERASE UR QL STRIP: NEGATIVE
LITHIUM SERPL-SCNC: 0.5 MMOL/L (ref 0.5–1)
LYMPHOCYTES # BLD AUTO: 1.98 THOUSANDS/ΜL (ref 0.6–4.47)
LYMPHOCYTES NFR BLD AUTO: 20 % (ref 14–44)
MCH RBC QN AUTO: 30.6 PG (ref 26.8–34.3)
MCHC RBC AUTO-ENTMCNC: 33.4 G/DL (ref 31.4–37.4)
MCV RBC AUTO: 92 FL (ref 82–98)
METHADONE UR QL: NEGATIVE
MONOCYTES # BLD AUTO: 0.87 THOUSAND/ΜL (ref 0.17–1.22)
MONOCYTES NFR BLD AUTO: 9 % (ref 4–12)
NEUTROPHILS # BLD AUTO: 6.74 THOUSANDS/ΜL (ref 1.85–7.62)
NEUTS SEG NFR BLD AUTO: 68 % (ref 43–75)
NITRITE UR QL STRIP: NEGATIVE
NON-SQ EPI CELLS URNS QL MICRO: ABNORMAL /HPF
NRBC BLD AUTO-RTO: 0 /100 WBCS
OPIATES UR QL SCN: NEGATIVE
P AXIS: 55 DEGREES
PCP UR QL: NEGATIVE
PH UR STRIP.AUTO: 7 [PH]
PLATELET # BLD AUTO: 297 THOUSANDS/UL (ref 149–390)
PMV BLD AUTO: 9.1 FL (ref 8.9–12.7)
POTASSIUM SERPL-SCNC: 4 MMOL/L (ref 3.5–5.3)
PR INTERVAL: 132 MS
PROT UR STRIP-MCNC: ABNORMAL MG/DL
QRS AXIS: 11 DEGREES
QRSD INTERVAL: 84 MS
QT INTERVAL: 312 MS
QTC INTERVAL: 433 MS
RBC # BLD AUTO: 4.96 MILLION/UL (ref 3.81–5.12)
RBC #/AREA URNS AUTO: ABNORMAL /HPF
SODIUM SERPL-SCNC: 138 MMOL/L (ref 136–145)
SP GR UR STRIP.AUTO: 1.01 (ref 1–1.03)
T WAVE AXIS: 49 DEGREES
THC UR QL: NEGATIVE
UROBILINOGEN UR QL STRIP.AUTO: 0.2 E.U./DL
VENTRICULAR RATE: 116 BPM
WBC # BLD AUTO: 9.98 THOUSAND/UL (ref 4.31–10.16)
WBC #/AREA URNS AUTO: ABNORMAL /HPF

## 2019-08-15 PROCEDURE — 36415 COLL VENOUS BLD VENIPUNCTURE: CPT | Performed by: EMERGENCY MEDICINE

## 2019-08-15 PROCEDURE — 87591 N.GONORRHOEAE DNA AMP PROB: CPT | Performed by: EMERGENCY MEDICINE

## 2019-08-15 PROCEDURE — 99285 EMERGENCY DEPT VISIT HI MDM: CPT | Performed by: EMERGENCY MEDICINE

## 2019-08-15 PROCEDURE — 80048 BASIC METABOLIC PNL TOTAL CA: CPT | Performed by: EMERGENCY MEDICINE

## 2019-08-15 PROCEDURE — 80307 DRUG TEST PRSMV CHEM ANLYZR: CPT | Performed by: EMERGENCY MEDICINE

## 2019-08-15 PROCEDURE — 85025 COMPLETE CBC W/AUTO DIFF WBC: CPT | Performed by: EMERGENCY MEDICINE

## 2019-08-15 PROCEDURE — 87491 CHLMYD TRACH DNA AMP PROBE: CPT | Performed by: EMERGENCY MEDICINE

## 2019-08-15 PROCEDURE — 99285 EMERGENCY DEPT VISIT HI MDM: CPT

## 2019-08-15 PROCEDURE — 81025 URINE PREGNANCY TEST: CPT | Performed by: EMERGENCY MEDICINE

## 2019-08-15 PROCEDURE — 93010 ELECTROCARDIOGRAM REPORT: CPT | Performed by: INTERNAL MEDICINE

## 2019-08-15 PROCEDURE — 87081 CULTURE SCREEN ONLY: CPT | Performed by: EMERGENCY MEDICINE

## 2019-08-15 PROCEDURE — 80178 ASSAY OF LITHIUM: CPT | Performed by: EMERGENCY MEDICINE

## 2019-08-15 PROCEDURE — 81001 URINALYSIS AUTO W/SCOPE: CPT | Performed by: EMERGENCY MEDICINE

## 2019-08-15 PROCEDURE — 93005 ELECTROCARDIOGRAM TRACING: CPT

## 2019-08-15 PROCEDURE — 87158 CULTURE TYPING ADDED METHOD: CPT | Performed by: EMERGENCY MEDICINE

## 2019-08-15 RX ORDER — MAGNESIUM GLUCONATE 30 MG(550)
1 TABLET ORAL DAILY
COMMUNITY
End: 2019-08-29 | Stop reason: HOSPADM

## 2019-08-15 RX ORDER — FERROUS SULFATE 325(65) MG
27 TABLET ORAL
COMMUNITY
End: 2019-08-29 | Stop reason: HOSPADM

## 2019-08-15 RX ORDER — MAGNESIUM HYDROXIDE/ALUMINUM HYDROXICE/SIMETHICONE 120; 1200; 1200 MG/30ML; MG/30ML; MG/30ML
30 SUSPENSION ORAL EVERY 4 HOURS PRN
Status: CANCELLED | OUTPATIENT
Start: 2019-08-15

## 2019-08-15 RX ORDER — NAPROXEN 500 MG/1
500 TABLET ORAL 2 TIMES DAILY WITH MEALS
COMMUNITY
End: 2019-08-29 | Stop reason: HOSPADM

## 2019-08-15 RX ORDER — TRAZODONE HYDROCHLORIDE 50 MG/1
200 TABLET ORAL
Status: DISCONTINUED | OUTPATIENT
Start: 2019-08-15 | End: 2019-08-16 | Stop reason: HOSPADM

## 2019-08-15 RX ORDER — NICOTINE 21 MG/24HR
1 PATCH, TRANSDERMAL 24 HOURS TRANSDERMAL DAILY
Status: CANCELLED | OUTPATIENT
Start: 2019-08-16

## 2019-08-15 RX ORDER — ACETAMINOPHEN 325 MG/1
650 TABLET ORAL EVERY 6 HOURS PRN
Status: CANCELLED | OUTPATIENT
Start: 2019-08-15

## 2019-08-15 RX ORDER — ACETAMINOPHEN 325 MG/1
975 TABLET ORAL ONCE
Status: COMPLETED | OUTPATIENT
Start: 2019-08-15 | End: 2019-08-15

## 2019-08-15 RX ORDER — LORAZEPAM 2 MG/ML
2 INJECTION INTRAMUSCULAR EVERY 6 HOURS PRN
Status: CANCELLED | OUTPATIENT
Start: 2019-08-15

## 2019-08-15 RX ORDER — POTASSIUM CHLORIDE 750 MG/1
10 TABLET, EXTENDED RELEASE ORAL 2 TIMES DAILY
COMMUNITY
End: 2019-08-29 | Stop reason: HOSPADM

## 2019-08-15 RX ORDER — TRAZODONE HYDROCHLORIDE 50 MG/1
50 TABLET ORAL
Status: CANCELLED | OUTPATIENT
Start: 2019-08-15

## 2019-08-15 RX ORDER — HALOPERIDOL 5 MG/ML
5 INJECTION INTRAMUSCULAR EVERY 6 HOURS PRN
Status: CANCELLED | OUTPATIENT
Start: 2019-08-15

## 2019-08-15 RX ORDER — MULTIVITAMIN WITH IRON
100 TABLET ORAL DAILY
COMMUNITY
End: 2019-08-29 | Stop reason: HOSPADM

## 2019-08-15 RX ORDER — BENZTROPINE MESYLATE 1 MG/ML
1 INJECTION INTRAMUSCULAR; INTRAVENOUS EVERY 6 HOURS PRN
Status: CANCELLED | OUTPATIENT
Start: 2019-08-15

## 2019-08-15 RX ORDER — TRAZODONE HYDROCHLORIDE 50 MG/1
200 TABLET ORAL
Status: DISCONTINUED | OUTPATIENT
Start: 2019-08-15 | End: 2019-08-15

## 2019-08-15 RX ORDER — BENZTROPINE MESYLATE 1 MG/1
1 TABLET ORAL EVERY 6 HOURS PRN
Status: CANCELLED | OUTPATIENT
Start: 2019-08-15

## 2019-08-15 RX ORDER — OMEGA-3-ACID ETHYL ESTERS 1 G/1
1 CAPSULE, LIQUID FILLED ORAL 2 TIMES DAILY
COMMUNITY
End: 2019-08-29 | Stop reason: HOSPADM

## 2019-08-15 RX ORDER — LORAZEPAM 1 MG/1
1 TABLET ORAL EVERY 6 HOURS PRN
Status: CANCELLED | OUTPATIENT
Start: 2019-08-15

## 2019-08-15 RX ORDER — HALOPERIDOL 5 MG
5 TABLET ORAL EVERY 6 HOURS PRN
Status: CANCELLED | OUTPATIENT
Start: 2019-08-15

## 2019-08-15 RX ADMIN — ACETAMINOPHEN 975 MG: 325 TABLET ORAL at 22:44

## 2019-08-15 RX ADMIN — Medication 1 SPRAY: at 22:44

## 2019-08-15 RX ADMIN — TRAZODONE HYDROCHLORIDE 200 MG: 50 TABLET ORAL at 15:08

## 2019-08-15 NOTE — ED NOTES
CW spoke with Kylie Antonio of [facility] to log call into their queue to pre-cert pt      Dinora Underwood, [facility], [address]  08/15/19 17:36

## 2019-08-15 NOTE — ED NOTES
Both pt and Dr Cordero Poster signed the [place] document  CW spoke with Gia Stout of Intake who stated he has a bed available at [facility]  CW faxed pt's Misty to him for review      Porsche Roper, [facility], [address]  08/15/19 17:34
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
## 2019-08-15 NOTE — LETTER
Section I - General Information    Name of Patient: Rebekah Brown                 : 1975    Medicare #:____________________  Transport Date: 08/15/19 (PCS is valid for round trips on this date and for all repetitive trips in the 60-day range as noted below )  Origin: [address]                                                         Destination:____[facility], [facility]____________________________________________  Is the pt's stay covered under Medicare Part A (PPS/DRG)     (_) YES  (X_) NO  Closest appropriate facility? (_X) YES  (_) NO  If no, why is transport to more distant facility required?________________________  If hosp-hosp transfer, describe services needed at 2nd facility not available at 1st facility? _ IP Tx_____________________  If hospice pt, is this transport related to pt's terminal illness? (_) YES (_) NO Describe____________________________________    Section II - Medical Necessity Questionnaire  Ambulance transportation is medically necessary only if other means of transport are contraindicated or would be potentially harmful to the patient  To meet this requirement, the patient must either be "bed confined" or suffer from a condition such that transport by means other than ambulance is contraindicated by the patient's condition   The following questions must be answered by the medical professional signing below for this form to be valid:    1)  Describe the MEDICAL CONDITION (physical and/or mental) of this patient AT [address] [place] [address] that requires the patient to be transported in an ambulance and why transport by other means is contraindicated by the patient's condition:___SI_______________________________________________________________________________________________    2) Is the patient "bed confined" as defined below?     (_) YES  (X_) NO  To be "be confined" the patient must satisfy all three of the following conditions: (1) unable to get up from bed without Assistance; AND (2) unable to ambulate; AND (3) unable to sit in a chair or wheelchair  3) Can this patient safely be transported by car or [facility] (i e , seated during transport without a medical attendant or monitoring)?   (_) YES  (X) NO    4) In addition to completing questions 1-3 above, please check any of the following conditions that apply*:  *Note: supporting documentation for any boxes checked must be maintained in the patient's medical records  (_)Contractures   (_)Non-Healed Fractures  (_)Patient is confused (_)Patient is comatose (_)Moderate/severe pain on movement (X_)Danger to self/others  (_)IV meds/fluids required (_)Patient is combative(_)Need or possible need for restraints (_)DVT requires elevation of lower extremity  (_)Medical attendant required (_)Requires oxygen-unable to self administer (_)Special handling/isolation/infection control precautions required (_)Unable to tolerate seated position for time needed to transport (_)Hemodynamic monitoring required en route (_)Unable to sit in a chair or wheelchair due to decubitus ulcers or other wounds (_)Cardiac monitoring required en route (_)Morbid obesity requires additional personnel/equipment to safely handle patient (_)Orthopedic device (backboard, halo, pins, traction, brace, wedge, etc,) requiring special handling during transport (_)Other(specify)_______________________________________________    Section III - Signature of Physician or Healthcare Professional  I certify that the above information is true and correct based on my evaluation of this patient, and represent that the patient requires transport by ambulance and that other forms of transport are contraindicated   I understand that this information will be used by the Centers for Medicare and Medicaid Services (CMS) to support the determination of medical necessity for ambulance services, and I represent that I have personal knowledge of the patient's condition at time of transport  (_) If this box is checked, I also certify that the patient is physically or mentally incapable of signing the ambulance service's claim and that the institution with which I am affiliated has furnished care, services, or assistance to the patient  My signature below is made on behalf of the patient pursuant to 42 CFR §424 36(b)(4)  In accordance with 42 CFR §424 37, the specific reason(s) that the patient is physically or mentally incapable of signing the claim form is as follows: _________________________________________________________________________________________________________      Signature of Physician* or Healthcare Professional______________________________________________________________  Signature Date 08/15/19 (For scheduled repetitive transports, this form is not valid for transports performed more than 60 days after this date)    Printed Name & Credentials of Physician or Healthcare Professional (MD, DO, RN, etc )__A  ABIGAIL Bird_______________  *Form must be signed by patient's attending physician for scheduled, repetitive transports   For non-repetitive, unscheduled ambulance transports, if unable to obtain the signature of the attending physician, any of the following may sign (choose appropriate option below)  (_) Physician Assistant (_)  Clinical Nurse Specialist (_)  Registered Nurse  (_)  Nurse Practitioner  Esther Vo) Discharge Planner
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
## 2019-08-15 NOTE — ED NOTES
Insurance Authorization for admission:   Phone call placed to [facility]  Phone number: [phone]  Spoke to Genita Grief  5 days approved  Level of care: 201  Review on 8/19/19  Authorization # [id number]  EVS (Eligibility Verification System) called - [phone]  Automated system indicates: MA eligible    Insurance Authorization for Transportation:      CW faxed prior authorization request form to [phone] @ [employer] [facility] [address] [place] [address]  08/15/19 18:22

## 2019-08-15 NOTE — LETTER
[address]  [address]  [place] [address]  Dept: [phone]            EMTALA TRANSFER CONSENT    NAME Missael Harrington                                         1975                              MRN [id number]    I have been informed of my rights regarding examination, treatment, and transfer   by Dr Estuardo Ruby DO    Benefits: Continuity of care    Risks: Potential for delay in receiving treatment      { ED EMTALA TRANSFER CHOICES:[id number]}    I authorize the performance of emergency medical procedures and treatments upon me in both transit and upon arrival at the receiving facility  Additionally, I authorize the release of any and all medical records to the receiving facility and request they be transported with me, if possible  I understand that the safest mode of transportation during a medical emergency is an ambulance and that the Hospital advocates the use of this mode of transport  Risks of traveling to the receiving facility by car, including absence of medical control, life sustaining equipment, such as oxygen, and medical personnel has been explained to me and I fully understand them  (JASVIR CORRECT BOX BELOW)  Laura Mejias  ]  I consent to the stated transfer and to be transported by ambulance/helicopter  [  ]  I consent to the stated transfer, but refuse transportation by ambulance and accept full responsibility for my transportation by car    I understand the risks of non-ambulance transfers and I exonerate the Hospital and its staff from any deterioration in my condition that results from this refusal     X___________________________________________    DATE  08/15/19  TIME___23:03_____  Signature of patient or legally responsible individual signing on patient behalf           RELATIONSHIP TO PATIENT_________________________                                      Provider Certification    NAME Missael Harrington  1975                              MRN [id number]    A medical screening exam was performed on the above named patient  Based on the examination:    Condition Necessitating Transfer The encounter diagnosis was Bipolar II disorder ([address] )  Patient Condition: The patient has been stabilized such that within reasonable medical probability, no material deterioration of the patient condition or the condition of the unborn child(mariana) is likely to result from the transfer    Reason for Transfer: Level of Care needed not available at this facility    Transfer Requirements: Facility [facility] [address]   · Space available and qualified personnel available for treatment as acknowledged by JAMES Villela @ [phone]  · Agreed to accept transfer and to provide appropriate medical treatment as acknowledged by       Dr Jennifer Calderon  · Appropriate medical records of the examination and treatment of the patient are provided at the time of transfer   [facility], [address] __A  A _____  · Transfer will be performed by qualified personnel from [facility]  and appropriate transfer equipment as required, including the use of necessary and appropriate life support measures      Provider Certification: I have examined the patient and explained the following risks and benefits of being transferred/refusing transfer to the patient/family:  General risk, such as traffic hazards, adverse weather conditions, rough terrain or turbulence, possible failure of equipment (including vehicle or aircraft), or consequences of actions of persons outside the control of the transport personnel      Based on these reasonable risks and benefits to the patient and/or the unborn child(mariana), and based upon the information available at the time of the patients examination, I certify that the medical benefits reasonably to be expected from the provision of appropriate medical treatments at another medical facility outweigh the increasing risks, if any, to the individuals medical condition, and in the case of labor to the unborn child, from effecting the transfer      X____________________________________________ DATE 08/15/19        TIME_______      ORIGINAL - SEND TO MEDICAL RECORDS   COPY - SEND WITH PATIENT DURING TRANSFER

## 2019-08-16 ENCOUNTER — HOSPITAL ENCOUNTER (INPATIENT)
Facility: HOSPITAL | Age: 44
LOS: 13 days | Discharge: HOME | DRG: 753 | End: 2019-08-29
Attending: PSYCHIATRY & NEUROLOGY | Admitting: PSYCHIATRY & NEUROLOGY
Payer: COMMERCIAL

## 2019-08-16 VITALS
DIASTOLIC BLOOD PRESSURE: 76 MMHG | RESPIRATION RATE: 12 BRPM | WEIGHT: 187.17 LBS | OXYGEN SATURATION: 96 % | BODY MASS INDEX: 34.44 KG/M2 | HEART RATE: 75 BPM | SYSTOLIC BLOOD PRESSURE: 108 MMHG | HEIGHT: 62 IN | TEMPERATURE: 100.3 F

## 2019-08-16 DIAGNOSIS — F31.81 BIPOLAR II DISORDER (HCC): Primary | ICD-10-CM

## 2019-08-16 DIAGNOSIS — F17.200 SMOKING ADDICTION: ICD-10-CM

## 2019-08-16 DIAGNOSIS — F51.5 NIGHTMARES: ICD-10-CM

## 2019-08-16 LAB
BASOPHILS # BLD AUTO: 0 THOUSANDS/ΜL (ref 0–0.1)
BASOPHILS NFR BLD AUTO: 1 % (ref 0–1)
EOSINOPHIL # BLD AUTO: 0.7 THOUSAND/ΜL (ref 0–0.4)
EOSINOPHIL NFR BLD AUTO: 9 % (ref 0–6)
ERYTHROCYTE [DISTWIDTH] IN BLOOD BY AUTOMATED COUNT: 15.7 %
HCT VFR BLD AUTO: 44.4 % (ref 36–46)
HGB BLD-MCNC: 14.9 G/DL (ref 12–16)
LYMPHOCYTES # BLD AUTO: 2.7 THOUSANDS/ΜL (ref 0.5–4)
LYMPHOCYTES NFR BLD AUTO: 36 % (ref 25–45)
MCH RBC QN AUTO: 31.2 PG (ref 26–34)
MCHC RBC AUTO-ENTMCNC: 33.6 G/DL (ref 31–36)
MCV RBC AUTO: 93 FL (ref 80–100)
MONOCYTES # BLD AUTO: 0.4 THOUSAND/ΜL (ref 0.2–0.9)
MONOCYTES NFR BLD AUTO: 5 % (ref 1–10)
NEUTROPHILS # BLD AUTO: 3.7 THOUSANDS/ΜL (ref 1.8–7.8)
NEUTS SEG NFR BLD AUTO: 50 % (ref 45–65)
PLATELET # BLD AUTO: 268 THOUSANDS/UL (ref 150–450)
PMV BLD AUTO: 7.5 FL (ref 8.9–12.7)
RBC # BLD AUTO: 4.79 MILLION/UL (ref 4–5.2)
WBC # BLD AUTO: 7.6 THOUSAND/UL (ref 4.5–11)

## 2019-08-16 PROCEDURE — 99222 1ST HOSP IP/OBS MODERATE 55: CPT | Performed by: PSYCHIATRY & NEUROLOGY

## 2019-08-16 PROCEDURE — 99253 IP/OBS CNSLTJ NEW/EST LOW 45: CPT | Performed by: INTERNAL MEDICINE

## 2019-08-16 PROCEDURE — 85025 COMPLETE CBC W/AUTO DIFF WBC: CPT | Performed by: PSYCHIATRY & NEUROLOGY

## 2019-08-16 RX ORDER — NICOTINE 21 MG/24HR
1 PATCH, TRANSDERMAL 24 HOURS TRANSDERMAL DAILY
Status: DISCONTINUED | OUTPATIENT
Start: 2019-08-16 | End: 2019-08-29 | Stop reason: HOSPADM

## 2019-08-16 RX ORDER — GABAPENTIN 300 MG/1
600 CAPSULE ORAL 2 TIMES DAILY
Status: DISCONTINUED | OUTPATIENT
Start: 2019-08-16 | End: 2019-08-18

## 2019-08-16 RX ORDER — BENZTROPINE MESYLATE 1 MG/1
1 TABLET ORAL EVERY 6 HOURS PRN
Status: DISCONTINUED | OUTPATIENT
Start: 2019-08-16 | End: 2019-08-29 | Stop reason: HOSPADM

## 2019-08-16 RX ORDER — BENZTROPINE MESYLATE 1 MG/ML
1 INJECTION INTRAMUSCULAR; INTRAVENOUS EVERY 6 HOURS PRN
Status: DISCONTINUED | OUTPATIENT
Start: 2019-08-16 | End: 2019-08-29 | Stop reason: HOSPADM

## 2019-08-16 RX ORDER — ACETAMINOPHEN 325 MG/1
650 TABLET ORAL EVERY 6 HOURS PRN
Status: DISCONTINUED | OUTPATIENT
Start: 2019-08-16 | End: 2019-08-29 | Stop reason: HOSPADM

## 2019-08-16 RX ORDER — MAGNESIUM HYDROXIDE/ALUMINUM HYDROXICE/SIMETHICONE 120; 1200; 1200 MG/30ML; MG/30ML; MG/30ML
30 SUSPENSION ORAL EVERY 4 HOURS PRN
Status: DISCONTINUED | OUTPATIENT
Start: 2019-08-16 | End: 2019-08-29 | Stop reason: HOSPADM

## 2019-08-16 RX ORDER — HALOPERIDOL 5 MG
5 TABLET ORAL EVERY 6 HOURS PRN
Status: DISCONTINUED | OUTPATIENT
Start: 2019-08-16 | End: 2019-08-29 | Stop reason: HOSPADM

## 2019-08-16 RX ORDER — DULOXETIN HYDROCHLORIDE 60 MG/1
60 CAPSULE, DELAYED RELEASE ORAL DAILY
Status: DISCONTINUED | OUTPATIENT
Start: 2019-08-16 | End: 2019-08-29 | Stop reason: HOSPADM

## 2019-08-16 RX ORDER — HALOPERIDOL 5 MG/ML
5 INJECTION INTRAMUSCULAR EVERY 6 HOURS PRN
Status: DISCONTINUED | OUTPATIENT
Start: 2019-08-16 | End: 2019-08-29 | Stop reason: HOSPADM

## 2019-08-16 RX ORDER — LORAZEPAM 2 MG/ML
2 INJECTION INTRAMUSCULAR EVERY 6 HOURS PRN
Status: DISCONTINUED | OUTPATIENT
Start: 2019-08-16 | End: 2019-08-23

## 2019-08-16 RX ORDER — LORAZEPAM 1 MG/1
1 TABLET ORAL EVERY 6 HOURS PRN
Status: DISCONTINUED | OUTPATIENT
Start: 2019-08-16 | End: 2019-08-23

## 2019-08-16 RX ORDER — LITHIUM CARBONATE 300 MG/1
300 TABLET, FILM COATED, EXTENDED RELEASE ORAL EVERY 12 HOURS SCHEDULED
Status: DISCONTINUED | OUTPATIENT
Start: 2019-08-16 | End: 2019-08-20

## 2019-08-16 RX ORDER — TRAZODONE HYDROCHLORIDE 50 MG/1
50 TABLET ORAL
Status: DISCONTINUED | OUTPATIENT
Start: 2019-08-16 | End: 2019-08-29 | Stop reason: HOSPADM

## 2019-08-16 RX ADMIN — LORAZEPAM 1 MG: 1 TABLET ORAL at 21:37

## 2019-08-16 RX ADMIN — LITHIUM CARBONATE 300 MG: 300 TABLET, FILM COATED, EXTENDED RELEASE ORAL at 21:35

## 2019-08-16 RX ADMIN — GABAPENTIN 600 MG: 300 CAPSULE ORAL at 17:23

## 2019-08-16 RX ADMIN — LITHIUM CARBONATE 300 MG: 300 TABLET, FILM COATED, EXTENDED RELEASE ORAL at 12:15

## 2019-08-16 RX ADMIN — DULOXETINE HYDROCHLORIDE 60 MG: 60 CAPSULE, DELAYED RELEASE ORAL at 12:15

## 2019-08-16 RX ADMIN — LORAZEPAM 1 MG: 1 TABLET ORAL at 02:42

## 2019-08-16 RX ADMIN — TRAZODONE HYDROCHLORIDE 150 MG: 100 TABLET ORAL at 21:35

## 2019-08-16 RX ADMIN — GABAPENTIN 600 MG: 300 CAPSULE ORAL at 12:15

## 2019-08-16 NOTE — PROGRESS NOTES
08/16/19 0854   Team Meeting   Meeting Type Daily Rounds   Initial Conference Date 08/16/19   Team Members Present   Team Members Present Physician;Nurse;   Physician Team Member Dr Pedro Arenas Team Member [address] [facility],[address] [place] [address] Team Member Kathleen Barrett   Patient/Family Present   Patient Present No   Patient's Family Present No   New admission, chart and labs were reviewed  Medications to be discussed and started  Patient pending discharge for next week

## 2019-08-16 NOTE — PLAN OF CARE
Patient cooperative throughout the shift but was mostly isolative to her room  Patient was med/meal compliant and attended some groups  Other than groups and meals patient was not visible and was not seen interacting with peers  On her name patient states "my name does not always match my mood when I am feeling like this"  Patient with anxiety and depression but denies S/HI and A/VH      Problem: Risk for Self Injury/Neglect  Goal: Treatment Goal: Remain safe during length of stay, learn and adopt new coping skills, and be free of self-injurious ideation, impulses and acts at the time of discharge  Outcome: Progressing  Goal: Verbalize thoughts and feelings  Description  Interventions:  - Assess and re-assess patient's lethality and potential for self-injury  - Engage patient in 1:1 interactions, daily, for a minimum of 15 minutes  - Encourage patient to express feelings, fears, frustrations, hopes  - Establish rapport/trust with patient   Outcome: Progressing  Goal: Refrain from harming self  Description  Interventions:  - Monitor patient closely, per order  - Develop a trusting relationship  - Supervise medication ingestion, monitor effects and side effects   Outcome: Progressing  Goal: Attend and participate in unit activities, including therapeutic, recreational, and educational groups  Description  Interventions:  - Provide therapeutic and educational activities daily, encourage attendance and participation, and document same in the medical record  - Obtain collateral information, encourage visitation and family involvement in care   Outcome: Progressing  Goal: Recognize maladaptive responses and adopt new coping mechanisms  Outcome: Progressing  Goal: Complete daily ADLs, including personal hygiene independently, as able  Description  Interventions:  - Observe, teach, and assist patient with ADLS  - Monitor and promote a balance of rest/activity, with adequate nutrition and elimination  Outcome: Progressing     Problem: Depression  Goal: Treatment Goal: Demonstrate behavioral control of depressive symptoms, verbalize feelings of improved mood/affect, and adopt new coping skills prior to discharge  Outcome: Progressing  Goal: Verbalize thoughts and feelings  Description  Interventions:  - Assess and re-assess patient's level of risk   - Engage patient in 1:1 interactions, daily, for a minimum of 15 minutes   - Encourage patient to express feelings, fears, frustrations, hopes   Outcome: Progressing  Goal: Refrain from harming self  Description  Interventions:  - Monitor patient closely, per order   - Supervise medication ingestion, monitor effects and side effects   Outcome: Progressing  Goal: Refrain from isolation  Description  Interventions:  - Develop a trusting relationship   - Encourage socialization   Outcome: Progressing  Goal: Refrain from self-neglect  Outcome: Progressing  Goal: Attend and participate in unit activities, including therapeutic, recreational, and educational groups  Description  Interventions:  - Provide therapeutic and educational activities daily, encourage attendance and participation, and document same in the medical record   Outcome: Progressing  Goal: Complete daily ADLs, including personal hygiene independently, as able  Description  Interventions:  - Observe, teach, and assist patient with ADLS  -  Monitor and promote a balance of rest/activity, with adequate nutrition and elimination   Outcome: Progressing     Problem: Anxiety  Goal: Anxiety is at manageable level  Description  Interventions:  - Assess and monitor patient's anxiety level  - Monitor for signs and symptoms (heart palpitations, chest pain, shortness of breath, headaches, nausea, feeling jumpy, restlessness, irritable, apprehensive)  - Collaborate with interdisciplinary team and initiate plan and interventions as ordered    - [place] patient to unit/surroundings  - Explain treatment plan  - Encourage participation in care  - Encourage verbalization of concerns/fears  - Identify coping mechanisms  - Assist in developing anxiety-reducing skills  - Administer/offer alternative therapies  - Limit or eliminate stimulants  Outcome: Progressing

## 2019-08-16 NOTE — ED NOTES
Patient is accepted at [facility] SPECIALTY [facility],   Patient is accepted by Dr Osbaldo Morel per Robinson Streeter  Transportation is arranged with [facility]  Transportation is scheduled for 00:45  Patient may go to the floor at 01:45  CW informed Anabella Enamorado of Intake of pt's ETA  Nurse report is to be called to [phone] prior to patient transfer      Stacie Bowers, ABIGAIL  08/15/19 22:26
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
## 2019-08-16 NOTE — DISCHARGE INSTR - OTHER ORDERS
Crisis Information  If you are experiencing a mental health emergency, you may call the [address] 24 hours a day, 7 days per week at [phone]  [facility], call [phone]  When you need someone to listen, the Shanna Charmaine is available for 16 hours a day, 7 days a week, from the time of 7-10am and 2pm-2am   It is not available from the hours of 2am-6am and 10am-2pm  A representative can be reached at [phone]  The [facility] Family-to-Family Education Program is a free 12-week (2 1/2 hours/week) course for families of individuals with severe brain disorders (mental illnesses)  The classes are taught by trained family members  All course materials are furnished at no cost to you  Below are some details  To register, e-mail [email or web address] or call [phone]  The curriculum focuses on schizophrenia, bipolar disorder (manic depression), clinical depression, panic disorders and obsessive-compulsive disorder (OCD)  The course discusses the clinical treatment of these illnesses and teaches the knowledge and skills that family members need to cope more effectively    Topics Include:  Learning about feelings, learning about facts   Schizophrenia, major depression and amaris: diagnosis and dealing with critical periods   Subtypes of depression and bipolar disorder, panic disorder and OCD; diagnosis and causes; sharing our stories   The biology of the brain/new research   Problem solving workshops   Medication review   Empathy workshop  what its like to have a brain disorder   Communication skills workshop   Self-care and relative groups   Rehabilitation, services available   Advocacy: fighting stigma   Review and certification ceremony    Nadw-rp-Csjz Education Course  The [employer] Education class is a ten week  two hours per week  experiential education course on the topic of recovery for any person with serious mental illness who is interested in establishing and maintaining wellness  The course uses a combination of lecture, interactive exercises, and structural group processes  The diversity of experience among participants affords for a lively dynamic that moves the course along  [employer]s Pxls-uw-Okrb Education class is offered free of charge to people who experience mental illness  You do not need to be a member of [employer] to take the course  Courses are taught by teams of trained mentors/peer-teachers who are themselves experienced at living well with mental illness  Below are some details  To register, call [phone] or e-mail [email or web address]  Sign up today! [address] [facility] group is for family members, caregivers, and loved ones of individuals living with the everyday challenges of mental illness  The leaders are family members in the same situation  Sessions take place in an intimate, confidential setting to allow families to share openly with each other  These support groups allow participants to learn from the experiences of other group members, share coping strategies, and offer each other encouragement and understanding  Hina Reyna know that you are not alone  Drop inno registration is necessary  Here are the times and locations  [email or web address]  Monthly: 3rd Monday, 7:00-8:30 pm  [facility]  [address]  Monthly: 4th Tuesday, 7:00-8:30 pm  [facility]  Monthly: 1st Monday, 7:00-8:30 pm  [address] [facility] [address] [place] [address]         Monthly Support for Persons with Mental Illness  The Peer Support Group is a monthly meeting for individuals facing the challenges of recovering from severe and persistent mental illness  Depression, manic depression, schizophrenia, and general anxiety disorder are only a few of the diagnoses of individuals who have found a supportive place at our meetings    Our Independence  We are a fellowship of individuals who share a common goal of recovery and the ability to maintain mental and emotional stability  We help others and ourselves through sharing our experiences, strength and hope with each other  No matter how traumatic our past or how despairing our present may be, there is hope for a new day  Sessions take place in an intimate, confidential setting to allow individuals to share openly with each other  Lili Glover know that you are not alone  Drop inno registration is necessary  Here are the times and locations    LYDIA  Monthly: 1st Monday, 7:00-8:30 pm  [facility]  [address] [place], [place]   [email or web address]  Monthly: 3rd Monday, 7:00-8:30 pm  [address]  [address] [facility]

## 2019-08-16 NOTE — PROGRESS NOTES
Clinical Pharmacy Note: [place] Therapeutic Monitoring     Kenny Simms is a 38 y o  female who presents with bipolar disorder  Assessment/Plan:    Lithium indication: home medication, augmentation therapy and mood disorder  Isabel is currently taking 300 mg lithium carbonate extended release tablet twice daily  Lithium concentration is 0 5 mmol/L on 8/15/19 suggesting patient has been compliant with medication therapy at home  Recommend drawing a steady state level after 5 days of therapy on the morning of Tuesday or Wednesday 8/20, 8/21  The pharmacist has checked for drug interactions, lithium levels, kidney function, thyroid function  YES        Pharmacy Recommendations:     Recommend drawing a steady state level after 5 days of therapy on the morning of Tuesday or Wednesday 8/20, 8/21  Monitoring:    Monitor for renal and thyroid dysfunction, skin reactions (acne), weight gain, and diabetes insipidus  Certain medications increase lithium levels and should be avoided such as diuretics, NSAIDS (excluding sulindac), and ACE-I/ARBs  Medications such as theophylline and caffeine may decrease lithium levels  Patient should maintain consistent adequate hydration and consistent caffeine and sodium intake  Lithium:  0   Lab Value Date/Time    LITHIUM 0 5 08/15/2019 1426       Renal:  0   Lab Value Date/Time    BUN 14 08/15/2019 1426    CREATININE 0 92 08/15/2019 1426       Thyroid:  0   Lab Value Date/Time    [id number] 3 230 02/19/2019 1047       Weight:  Wt Readings from Last 5 Encounters:   08/16/19 85 kg (187 lb 6 3 oz)   08/15/19 84 9 kg (187 lb 2 7 oz)   08/13/19 84 9 kg (187 lb 2 7 oz)   08/08/19 87 2 kg (192 lb 3 2 oz)   04/28/19 90 3 kg (199 lb)       Lithium Levels    Therapeutic lithium levels are 0 6-1 2 mmol/L, but target range may be 0 8-1 2 mmol/L for acute amaris   Levels above 1 5 mmol/L indicate toxicity, although toxicity may be associated with levels within therapeutic ranges based on symptoms  Mild GI discomfort and slight tremor are typical when initiating lithium, but if these symptoms do not resolve or any other signs of toxicity occur, assess lithium levels immediately  Toxicity    Signs of toxicity include: confusion, difficulty concentrating, vomiting, diarrhea, poor coordination, tremor, abnormal heart rhythm, seizures and coma  Pharmacy will continue to follow patient with team, thank you      Electronically signed by: Ayo Viera, PharmD, [address], Clinical Pharmacist - Psychiatry
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
## 2019-08-16 NOTE — ED NOTES
Care handoff given to  JENNIFER Abarca from [facility]        Lucy Traore RN  08/16/19 4294

## 2019-08-16 NOTE — ED NOTES
CW prepared pt's 201 document, EMTALA and Medical Necessity forms for transport      Stacie Levine CW  08/15/19 23:06
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
## 2019-08-17 LAB
C TRACH DNA SPEC QL NAA+PROBE: NEGATIVE
N GONORRHOEA DNA SPEC QL NAA+PROBE: NEGATIVE
N GONORRHOEA SPEC QL CULT: NO GROWTH
TROPONIN I SERPL-MCNC: <0.01 NG/ML (ref 0–0.03)

## 2019-08-17 PROCEDURE — 84484 ASSAY OF TROPONIN QUANT: CPT | Performed by: PSYCHIATRY & NEUROLOGY

## 2019-08-17 PROCEDURE — 99222 1ST HOSP IP/OBS MODERATE 55: CPT | Performed by: PSYCHIATRY & NEUROLOGY

## 2019-08-17 RX ADMIN — LITHIUM CARBONATE 300 MG: 300 TABLET, FILM COATED, EXTENDED RELEASE ORAL at 21:43

## 2019-08-17 RX ADMIN — GABAPENTIN 600 MG: 300 CAPSULE ORAL at 18:13

## 2019-08-17 RX ADMIN — LITHIUM CARBONATE 300 MG: 300 TABLET, FILM COATED, EXTENDED RELEASE ORAL at 09:49

## 2019-08-17 RX ADMIN — TRAZODONE HYDROCHLORIDE 150 MG: 100 TABLET ORAL at 21:43

## 2019-08-17 RX ADMIN — GABAPENTIN 600 MG: 300 CAPSULE ORAL at 09:49

## 2019-08-17 RX ADMIN — LORAZEPAM 1 MG: 1 TABLET ORAL at 21:45

## 2019-08-17 RX ADMIN — DULOXETINE HYDROCHLORIDE 60 MG: 60 CAPSULE, DELAYED RELEASE ORAL at 09:49

## 2019-08-17 NOTE — PROGRESS NOTES
Patient visible on unit  Med and meal compliant  Patient stated she had anxiety 1-4, depression 7-10, denies SI, HI, audio or visual hallucinations  She is pleasant and bright upon approach  Will continue to monitor for changes in current status

## 2019-08-17 NOTE — PLAN OF CARE
Problem: Risk for Self Injury/Neglect  Goal: Verbalize thoughts and feelings  Description  Interventions:  - Assess and re-assess patient's lethality and potential for self-injury  - Engage patient in 1:1 interactions, daily, for a minimum of 15 minutes  - Encourage patient to express feelings, fears, frustrations, hopes  - Establish rapport/trust with patient   Outcome: Progressing  Goal: Refrain from harming self  Description  Interventions:  - Monitor patient closely, per order  - Develop a trusting relationship  - Supervise medication ingestion, monitor effects and side effects   Outcome: Progressing

## 2019-08-17 NOTE — NURSING NOTE
Patient was cooperative and isolative to room during the evening  Patient presented with a blunted affect  Patient reported anxiety and depression  Patient also reported intermittent suicidal thoughts with no plan  Patient was administered PRN Ativan 1 mg for anxiety at 2137  Patient has appeared to be sleeping for the past few hours  Will continue to monitor closely

## 2019-08-17 NOTE — PROGRESS NOTES
08/17/19 1015   Activity/Group Checklist   Group Other (Comment)  (Art Therapy Group/Open Choice, Discussion)   Attendance Attended   Attendance Duration (min) 46-60   Interactions Interacted appropriately   Affect/Mood Appropriate   Goals Achieved Identified feelings; Discussed coping strategies; Identified distorted thoughts/beliefs; Able to listen to others; Able to engage in interactions; Able to reflect/comment on own behavior;Able to manage/cope with feelings; Able to recieve feedback; Able to give feedback to another;Able to self-disclose  (Able to engage art materials; full participation)
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
## 2019-08-17 NOTE — PROGRESS NOTES
08/17/19 1300   Team Meeting   Meeting Type Daily Rounds   Initial Conference Date 08/17/19   Team Members Present   Team Members Present Physician;Nurse   Physician Team Member Dr Toño Ward Team Member Luis Felipe Salmeron   Patient/Family Present   Patient Present No   Patient's Family Present No   Medical, labs and medications reviewed by team  Discharge to be determined by attending  Medication changes to be assessed

## 2019-08-18 PROCEDURE — 99232 SBSQ HOSP IP/OBS MODERATE 35: CPT | Performed by: PSYCHIATRY & NEUROLOGY

## 2019-08-18 RX ORDER — GABAPENTIN 300 MG/1
600 CAPSULE ORAL 3 TIMES DAILY
Status: DISCONTINUED | OUTPATIENT
Start: 2019-08-18 | End: 2019-08-29 | Stop reason: HOSPADM

## 2019-08-18 RX ADMIN — LITHIUM CARBONATE 300 MG: 300 TABLET, FILM COATED, EXTENDED RELEASE ORAL at 08:09

## 2019-08-18 RX ADMIN — HALOPERIDOL 5 MG: 5 TABLET ORAL at 18:34

## 2019-08-18 RX ADMIN — LITHIUM CARBONATE 300 MG: 300 TABLET, FILM COATED, EXTENDED RELEASE ORAL at 21:30

## 2019-08-18 RX ADMIN — GABAPENTIN 600 MG: 300 CAPSULE ORAL at 15:20

## 2019-08-18 RX ADMIN — LORAZEPAM 1 MG: 1 TABLET ORAL at 21:31

## 2019-08-18 RX ADMIN — DULOXETINE HYDROCHLORIDE 60 MG: 60 CAPSULE, DELAYED RELEASE ORAL at 08:00

## 2019-08-18 RX ADMIN — TRAZODONE HYDROCHLORIDE 150 MG: 100 TABLET ORAL at 21:30

## 2019-08-18 RX ADMIN — GABAPENTIN 600 MG: 300 CAPSULE ORAL at 08:33

## 2019-08-18 RX ADMIN — GABAPENTIN 600 MG: 300 CAPSULE ORAL at 21:30
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
## 2019-08-18 NOTE — PROGRESS NOTES
08/18/19 0900   Team Meeting   Meeting Type Daily Rounds   Initial Conference Date 08/18/19   Team Members Present   Team Members Present Physician;Nurse   Physician Team Member Dr Dedrick Arteaga Team Member Giovana Fish   Patient/Family Present   Patient Present No   Patient's Family Present No     Medications and labs reviewed by team   Medications to be assessed  Attending to determine discharge

## 2019-08-19 PROCEDURE — 99232 SBSQ HOSP IP/OBS MODERATE 35: CPT | Performed by: PSYCHIATRY & NEUROLOGY

## 2019-08-19 RX ADMIN — GABAPENTIN 600 MG: 300 CAPSULE ORAL at 16:56

## 2019-08-19 RX ADMIN — LITHIUM CARBONATE 300 MG: 300 TABLET, FILM COATED, EXTENDED RELEASE ORAL at 21:42

## 2019-08-19 RX ADMIN — GABAPENTIN 600 MG: 300 CAPSULE ORAL at 08:10

## 2019-08-19 RX ADMIN — DULOXETINE HYDROCHLORIDE 60 MG: 60 CAPSULE, DELAYED RELEASE ORAL at 08:10

## 2019-08-19 RX ADMIN — LITHIUM CARBONATE 300 MG: 300 TABLET, FILM COATED, EXTENDED RELEASE ORAL at 08:10

## 2019-08-19 RX ADMIN — LORAZEPAM 1 MG: 1 TABLET ORAL at 08:52

## 2019-08-19 RX ADMIN — GABAPENTIN 600 MG: 300 CAPSULE ORAL at 21:41

## 2019-08-19 RX ADMIN — TRAZODONE HYDROCHLORIDE 50 MG: 50 TABLET ORAL at 22:34

## 2019-08-19 RX ADMIN — TRAZODONE HYDROCHLORIDE 150 MG: 100 TABLET ORAL at 21:42

## 2019-08-19 RX ADMIN — LORAZEPAM 1 MG: 1 TABLET ORAL at 22:34

## 2019-08-19 NOTE — PROGRESS NOTES
Received patient at 0300  Patient appeared to be resting comfortably in bed  No respiratory distress, chest movement observed  No concerns, issues, or complaints were expressed  Will continue to monitor for changes in current status

## 2019-08-19 NOTE — PROGRESS NOTES
Pt has been pleasant and cooperative, visible/social  Pt is med/meal compliant  Pt reports a 4/4 anxiety and 5/10 depression but denies any HI, AH, or VH  Pt reports some SI but no plan  Pt does contract for safety  Pt was given Ativan 1 mg PRN at 2131  Will continue to monitor

## 2019-08-19 NOTE — PROGRESS NOTES
08/19/19 1000   Activity/Group Checklist   Group   (recovery group)   Attendance Attended   Attendance Duration (min) 46-60   Interactions Interacted appropriately   Affect/Mood Appropriate   Goals Achieved Discussed self-esteem issues; Able to listen to others; Able to engage in interactions; Able to reflect/comment on own behavior;Able to manage/cope with feelings; Able to self-disclose; Able to recieve feedback

## 2019-08-19 NOTE — PROGRESS NOTES
08/19/19 0851   Team Meeting   Meeting Type Daily Rounds   Initial Conference Date 08/19/19   Team Members Present   Team Members Present Physician;Nurse;   Physician Team Member Dr Karlo De Los Santos Management Team Member Osbaldo Martínez   Patient/Family Present   Patient Present No   Patient's Family Present No   Chart and labs were reviewed  Medications to be adjusted  Patient has been attending group therapy  Patient still has crying moments  Patient pending discharge for this week

## 2019-08-19 NOTE — PLAN OF CARE
Problem: Risk for Self Injury/Neglect  Goal: Treatment Goal: Remain safe during length of stay, learn and adopt new coping skills, and be free of self-injurious ideation, impulses and acts at the time of discharge  Outcome: Progressing  Goal: Verbalize thoughts and feelings  Description  Interventions:  - Assess and re-assess patient's lethality and potential for self-injury  - Engage patient in 1:1 interactions, daily, for a minimum of 15 minutes  - Encourage patient to express feelings, fears, frustrations, hopes  - Establish rapport/trust with patient   Outcome: Progressing  Goal: Refrain from harming self  Description  Interventions:  - Monitor patient closely, per order  - Develop a trusting relationship  - Supervise medication ingestion, monitor effects and side effects   Outcome: Progressing  Goal: Attend and participate in unit activities, including therapeutic, recreational, and educational groups  Description  Interventions:  - Provide therapeutic and educational activities daily, encourage attendance and participation, and document same in the medical record  - Obtain collateral information, encourage visitation and family involvement in care   Outcome: Progressing  Goal: Recognize maladaptive responses and adopt new coping mechanisms  Outcome: Progressing  Goal: Complete daily ADLs, including personal hygiene independently, as able  Description  Interventions:  - Observe, teach, and assist patient with ADLS  - Monitor and promote a balance of rest/activity, with adequate nutrition and elimination  Outcome: Progressing     Problem: Depression  Goal: Treatment Goal: Demonstrate behavioral control of depressive symptoms, verbalize feelings of improved mood/affect, and adopt new coping skills prior to discharge  Outcome: Progressing  Goal: Verbalize thoughts and feelings  Description  Interventions:  - Assess and re-assess patient's level of risk   - Engage patient in 1:1 interactions, daily, for a minimum of 15 minutes   - Encourage patient to express feelings, fears, frustrations, hopes   Outcome: Progressing  Goal: Refrain from harming self  Description  Interventions:  - Monitor patient closely, per order   - Supervise medication ingestion, monitor effects and side effects   Outcome: Progressing  Goal: Refrain from isolation  Description  Interventions:  - Develop a trusting relationship   - Encourage socialization   Outcome: Progressing  Goal: Refrain from self-neglect  Outcome: Progressing  Goal: Attend and participate in unit activities, including therapeutic, recreational, and educational groups  Description  Interventions:  - Provide therapeutic and educational activities daily, encourage attendance and participation, and document same in the medical record   Outcome: Progressing  Goal: Complete daily ADLs, including personal hygiene independently, as able  Description  Interventions:  - Observe, teach, and assist patient with ADLS  -  Monitor and promote a balance of rest/activity, with adequate nutrition and elimination   Outcome: Progressing     Problem: Anxiety  Goal: Anxiety is at manageable level  Description  Interventions:  - Assess and monitor patient's anxiety level  - Monitor for signs and symptoms (heart palpitations, chest pain, shortness of breath, headaches, nausea, feeling jumpy, restlessness, irritable, apprehensive)  - Collaborate with interdisciplinary team and initiate plan and interventions as ordered    - [place] patient to unit/surroundings  - Explain treatment plan  - Encourage participation in care  - Encourage verbalization of concerns/fears  - Identify coping mechanisms  - Assist in developing anxiety-reducing skills  - Administer/offer alternative therapies  - Limit or eliminate stimulants  Outcome: Progressing
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
## 2019-08-19 NOTE — PROGRESS NOTES
Pt c/o 4/4 anxiety  Denies AH,VH,HI,SI  8/10 depression  Pt provided ativan for anxiety  Pt is pleasant upon approach  Blunted affect and appears depressed  Does not appear to be in distress at this time  Pt was tearful during the interview and spoke about concern with placement upon discharge  She is encouraged to speak with social work regarding this

## 2019-08-20 PROCEDURE — 99232 SBSQ HOSP IP/OBS MODERATE 35: CPT | Performed by: PSYCHIATRY & NEUROLOGY

## 2019-08-20 RX ORDER — LITHIUM CARBONATE 450 MG
450 TABLET, EXTENDED RELEASE ORAL EVERY 12 HOURS SCHEDULED
Status: DISCONTINUED | OUTPATIENT
Start: 2019-08-20 | End: 2019-08-29 | Stop reason: HOSPADM

## 2019-08-20 RX ADMIN — LORAZEPAM 1 MG: 1 TABLET ORAL at 10:10

## 2019-08-20 RX ADMIN — GABAPENTIN 600 MG: 300 CAPSULE ORAL at 21:22

## 2019-08-20 RX ADMIN — LITHIUM CARBONATE 300 MG: 300 TABLET, FILM COATED, EXTENDED RELEASE ORAL at 08:07

## 2019-08-20 RX ADMIN — TRAZODONE HYDROCHLORIDE 150 MG: 100 TABLET ORAL at 21:22

## 2019-08-20 RX ADMIN — LORAZEPAM 1 MG: 1 TABLET ORAL at 21:24

## 2019-08-20 RX ADMIN — GABAPENTIN 600 MG: 300 CAPSULE ORAL at 17:27

## 2019-08-20 RX ADMIN — LITHIUM CARBONATE 450 MG: 450 TABLET, EXTENDED RELEASE ORAL at 21:22

## 2019-08-20 RX ADMIN — GABAPENTIN 600 MG: 300 CAPSULE ORAL at 08:07

## 2019-08-20 RX ADMIN — DULOXETINE HYDROCHLORIDE 60 MG: 60 CAPSULE, DELAYED RELEASE ORAL at 08:07

## 2019-08-20 NOTE — PLAN OF CARE
Problem: Risk for Self Injury/Neglect  Goal: Verbalize thoughts and feelings  Description  Interventions:  - Assess and re-assess patient's lethality and potential for self-injury  - Engage patient in 1:1 interactions, daily, for a minimum of 15 minutes  - Encourage patient to express feelings, fears, frustrations, hopes  - Establish rapport/trust with patient   Outcome: Progressing     Problem: Anxiety  Goal: Anxiety is at manageable level  Description  Interventions:  - Assess and monitor patient's anxiety level  - Monitor for signs and symptoms (heart palpitations, chest pain, shortness of breath, headaches, nausea, feeling jumpy, restlessness, irritable, apprehensive)  - Collaborate with interdisciplinary team and initiate plan and interventions as ordered  - [place] patient to unit/surroundings  - Explain treatment plan  - Encourage participation in care  - Encourage verbalization of concerns/fears  - Identify coping mechanisms  - Assist in developing anxiety-reducing skills  - Administer/offer alternative therapies  - Limit or eliminate stimulants  Outcome: Progressing  Patient is visible on unit, affect is sad, brightens upon approach  She was med compliant  No complaints of Si, Hi, audio and visual hallucinations, anxiety or depression  Will continue to monitor for changes in current status

## 2019-08-20 NOTE — PROGRESS NOTES
08/20/19 0826   Team Meeting   Meeting Type Daily Rounds   Initial Conference Date 08/20/19   Team Members Present   Team Members Present Physician;Nurse;   Physician Team Member Dr Curtis Rangel   Care Management Team Member Danuta Fang   Patient/Family Present   Patient Present No   Patient's Family Present No   Chart and labs were reviewed  Mediations to be adjusted  Patient continued to report ongoing suicidal ideations due to trauma prior to admission  Patient has been attending group therapy  Discharge date to be determined

## 2019-08-20 NOTE — PLAN OF CARE
Patient pleasant upon approach  Patient denies anxiety, depression, S/HI,A/V and pain  Patient reports "it's early yet" when asked assessment questions  Med and meal compliant  Visible and some socialization at times  Will continue to monitor and provide therapeutic support
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
## 2019-08-20 NOTE — PROGRESS NOTES
Progress Note - Behavioral Health     Best Burgos 38 y o  female MRN: [id number]   Unit/Bed#: [facility] 379-01 Encounter: [id number]    Behavior over the last 24 hours: minimal improvement  Isabel was seen and evaluated for continuing care and case was discussed with the treatment team  She appears still very anxious and depressed, continues to report anxiety symptoms, depressive symptoms and negative thoughts, still feels overwhelmed with suicidal thoughts today  Has been taking medications  Follows directions appropriately  Participates appropriately in milieu  Sleep: improved  Appetite: normal  Medication side effects: No   ROS: no complaints    Mental Status Evaluation:    Appearance:  casually dressed   Behavior:  cooperative, calm   Speech:  normal rate and volume   Mood:  depressed, anxious   Affect:  tearful   Thought Process:  linear   Associations: intact associations   Thought Content:  no overt delusions   Perceptual Disturbances: no auditory hallucinations   Risk Potential: Suicidal ideation - Yes  Homicidal ideation - None at present  Potential for aggression - Not at present   Sensorium:  oriented to person, place and time/date   Memory:  recent and remote memory grossly intact   Consciousness:  alert and awake   Attention: attention span and concentration are age appropriate   Insight:  fair   Judgment: fair   Gait/Station: normal gait/station   Motor Activity: no abnormal movements     Vital signs in last 24 hours:    Temp:  [97 5 °F (36 4 °C)-98 3 °F (36 8 °C)] 98 3 °F (36 8 °C)  HR:  [56-68] 68  Resp:  [16] 16  BP: (105-118)/(66-71) 105/66    Laboratory results:  I have personally reviewed all pertinent laboratory/tests results  Progress Toward Goals: limited improvement    Assessment/Plan   Principal Problem:    Bipolar II disorder (HCC)  Active Problems:    HEAVEN (generalized anxiety disorder)    Recommended Treatment:     Planned medication and treatment changes:     All current active medications have been reviewed  Encourage group therapy, milieu therapy and occupational therapy  Behavioral Health checks every 7 minutes  Increase Lithium to 450mg BID  Continue all other medications:    Current Facility-Administered Medications:  acetaminophen 650 mg Oral Q6H PRN Kleber Mcclure MD   aluminum-magnesium hydroxide-simethicone 30 mL Oral Q4H PRN Kleber Mcclure MD   benztropine 1 mg Intramuscular Q6H PRN Kleber Mcclure MD   benztropine 1 mg Oral Q6H PRN Kleber Mcclure MD   DULoxetine 60 mg Oral Daily Pola Pyle MD   gabapentin 600 mg Oral TID Randna MD Silvestre   haloperidol 5 mg Oral Q6H PRN Kleber Mcclure MD   haloperidol lactate 5 mg Intramuscular Q6H PRN Kleber Mcclure MD   lithium carbonate 450 mg Oral Q12H [facility] Pola Pyle MD   LORazepam 2 mg Intramuscular Q6H PRN Kleber Mcclure MD   LORazepam 1 mg Oral Q6H PRN Kleber Mcclure MD   magnesium hydroxide 30 mL Oral Daily PRN Kleber Mcclure MD   nicotine 1 patch Transdermal Daily Kleber Mcclure MD   traZODone 150 mg Oral HS Pola Pyle MD   traZODone 50 mg Oral HS PRN Kleber Mcclure MD       Risks / Benefits of Treatment:    Risks, benefits, and possible side effects of medications explained to patient and patient verbalizes understanding and agreement for treatment  Counseling / Coordination of Care:    Patient's progress discussed with staff in treatment team meeting  Medications, treatment progress and treatment plan reviewed with patient  Reassurance and supportive therapy provided  Encouraged participation in milieu and group therapy on the unit      Pola Pyle MD 08/20/19

## 2019-08-20 NOTE — SOCIAL WORK
Patient continues to report ongoing SI and is having difficulty with the trauma she experienced prior to admission  Patient has been visible and social with her peers and has been attending group therapy

## 2019-08-20 NOTE — PROGRESS NOTES
Patient came to the medication room and reports "SI with no plan and High anxiety" Patient does contract for safety  Given ativan as requested  Will monitor

## 2019-08-20 NOTE — PROGRESS NOTES
Patient slept eight hours through the night and did not awaken to report any problems or distress, or request any PRN's  Patient slept for entire shift with no issues, and was observed sleeping, with eyes closed, chest movements noted, and breathing heard  Q7 minute rounding continued for patient safety  Will continue to monitor

## 2019-08-21 PROCEDURE — 99232 SBSQ HOSP IP/OBS MODERATE 35: CPT | Performed by: PSYCHIATRY & NEUROLOGY

## 2019-08-21 RX ADMIN — TRAZODONE HYDROCHLORIDE 150 MG: 100 TABLET ORAL at 21:24

## 2019-08-21 RX ADMIN — LORAZEPAM 1 MG: 1 TABLET ORAL at 09:23

## 2019-08-21 RX ADMIN — GABAPENTIN 600 MG: 300 CAPSULE ORAL at 17:42

## 2019-08-21 RX ADMIN — LORAZEPAM 1 MG: 1 TABLET ORAL at 15:45

## 2019-08-21 RX ADMIN — LITHIUM CARBONATE 450 MG: 450 TABLET, EXTENDED RELEASE ORAL at 21:24

## 2019-08-21 RX ADMIN — GABAPENTIN 600 MG: 300 CAPSULE ORAL at 21:24

## 2019-08-21 RX ADMIN — GABAPENTIN 600 MG: 300 CAPSULE ORAL at 09:14

## 2019-08-21 RX ADMIN — DULOXETINE HYDROCHLORIDE 60 MG: 60 CAPSULE, DELAYED RELEASE ORAL at 09:14

## 2019-08-21 RX ADMIN — LITHIUM CARBONATE 450 MG: 450 TABLET, EXTENDED RELEASE ORAL at 09:14

## 2019-08-21 NOTE — PROGRESS NOTES
08/21/19 0829   Team Meeting   Meeting Type Daily Rounds   Initial Conference Date 08/21/19   Team Members Present   Team Members Present Physician;Nurse;   Physician Team Member Dr Chasidy Weeks Team Member [facility] Team Member Anita Lovett   Patient/Family Present   Patient Present No   Patient's Family Present No   Labs and charts reviewed  Pt reporting SI as of 8/20  Meds to be monitored  Lithium increased on 8/20  Projected LOS 5-7 days

## 2019-08-21 NOTE — PLAN OF CARE
Patient visible and social upon approach  Patient reports 3/4 anxiety and denies depression, S/HI, A/VH and pain  Patient given ativan po as requested for anxiety  Med and meal compliant  Visible and social on the unit this morning  Will continue to monitor and provide therapeutic support,

## 2019-08-21 NOTE — NURSING NOTE
Patient slept through the night  Patient was observed sleeping, with eyes closed, chest movements noted, and breathing heard during sleep period  Will continue to monitor and provide therapeutic support
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
## 2019-08-21 NOTE — PROGRESS NOTES
Pt has been pleasant and cooperative, visible/social  Pt is med/meal compliant  Pt reports 3/4 anxiety but denies any depression, SI, HI, AH, or VH  Pt was given Ativan 1 mg PRN at 2125  Will continue to monitor

## 2019-08-21 NOTE — PLAN OF CARE
Problem: Risk for Self Injury/Neglect  Goal: Treatment Goal: Remain safe during length of stay, learn and adopt new coping skills, and be free of self-injurious ideation, impulses and acts at the time of discharge  Outcome: Progressing  Goal: Verbalize thoughts and feelings  Description  Interventions:  - Assess and re-assess patient's lethality and potential for self-injury  - Engage patient in 1:1 interactions, daily, for a minimum of 15 minutes  - Encourage patient to express feelings, fears, frustrations, hopes  - Establish rapport/trust with patient   Outcome: Progressing  Goal: Refrain from harming self  Description  Interventions:  - Monitor patient closely, per order  - Develop a trusting relationship  - Supervise medication ingestion, monitor effects and side effects   Outcome: Progressing  Goal: Attend and participate in unit activities, including therapeutic, recreational, and educational groups  Description  Interventions:  - Provide therapeutic and educational activities daily, encourage attendance and participation, and document same in the medical record  - Obtain collateral information, encourage visitation and family involvement in care   Outcome: Progressing  Goal: Recognize maladaptive responses and adopt new coping mechanisms  Outcome: Progressing  Goal: Complete daily ADLs, including personal hygiene independently, as able  Description  Interventions:  - Observe, teach, and assist patient with ADLS  - Monitor and promote a balance of rest/activity, with adequate nutrition and elimination  Outcome: Progressing     Problem: Depression  Goal: Treatment Goal: Demonstrate behavioral control of depressive symptoms, verbalize feelings of improved mood/affect, and adopt new coping skills prior to discharge  Outcome: Progressing  Goal: Verbalize thoughts and feelings  Description  Interventions:  - Assess and re-assess patient's level of risk   - Engage patient in 1:1 interactions, daily, for a minimum of 15 minutes   - Encourage patient to express feelings, fears, frustrations, hopes   Outcome: Progressing  Goal: Refrain from harming self  Description  Interventions:  - Monitor patient closely, per order   - Supervise medication ingestion, monitor effects and side effects   Outcome: Progressing  Goal: Refrain from isolation  Description  Interventions:  - Develop a trusting relationship   - Encourage socialization   Outcome: Progressing  Goal: Refrain from self-neglect  Outcome: Progressing  Goal: Attend and participate in unit activities, including therapeutic, recreational, and educational groups  Description  Interventions:  - Provide therapeutic and educational activities daily, encourage attendance and participation, and document same in the medical record   Outcome: Progressing  Goal: Complete daily ADLs, including personal hygiene independently, as able  Description  Interventions:  - Observe, teach, and assist patient with ADLS  -  Monitor and promote a balance of rest/activity, with adequate nutrition and elimination   Outcome: Progressing     Problem: Anxiety  Goal: Anxiety is at manageable level  Description  Interventions:  - Assess and monitor patient's anxiety level  - Monitor for signs and symptoms (heart palpitations, chest pain, shortness of breath, headaches, nausea, feeling jumpy, restlessness, irritable, apprehensive)  - Collaborate with interdisciplinary team and initiate plan and interventions as ordered    - [place] patient to unit/surroundings  - Explain treatment plan  - Encourage participation in care  - Encourage verbalization of concerns/fears  - Identify coping mechanisms  - Assist in developing anxiety-reducing skills  - Administer/offer alternative therapies  - Limit or eliminate stimulants  Outcome: Progressing

## 2019-08-21 NOTE — PROGRESS NOTES
Patient approached this note writer asking for a PRN  Patient states she has a hard time when there are no groups  Patient was visibly shaking and appeared to be anxious  Patient states "If you're Mer Rubio ask it's a 4/4"  Based on the [address] scale patient received Ativan  Patient is now visualized interacting with peers and mood is more calm       08/21/19 1540   [place] Anxiety Scale   Anxious Mood 2   Tension 2   Fears 2   Insomnia 2   Intellectual 0   Depressed Mood 2   Somatic Complaints: Muscular 1   Somatic Complaints: Sensory 2   Cardiovascular Symptoms 0   Respiratory Symptoms 1   Gastrointestinal Symptoms 2   Genitourinary Symptoms 0   Autonomic Symptoms 0   Behavior at Interview 2   [place]ui Anxiety Score 18

## 2019-08-22 PROCEDURE — 99232 SBSQ HOSP IP/OBS MODERATE 35: CPT | Performed by: PSYCHIATRY & NEUROLOGY

## 2019-08-22 RX ORDER — PRAZOSIN HYDROCHLORIDE 1 MG/1
1 CAPSULE ORAL
Status: DISCONTINUED | OUTPATIENT
Start: 2019-08-22 | End: 2019-08-29 | Stop reason: HOSPADM

## 2019-08-22 RX ORDER — BACITRACIN, NEOMYCIN, POLYMYXIN B 400; 3.5; 5 [USP'U]/G; MG/G; [USP'U]/G
1 OINTMENT TOPICAL 2 TIMES DAILY PRN
Status: DISCONTINUED | OUTPATIENT
Start: 2019-08-22 | End: 2019-08-29 | Stop reason: HOSPADM

## 2019-08-22 RX ADMIN — LITHIUM CARBONATE 450 MG: 450 TABLET, EXTENDED RELEASE ORAL at 08:23

## 2019-08-22 RX ADMIN — LORAZEPAM 1 MG: 1 TABLET ORAL at 22:08

## 2019-08-22 RX ADMIN — TRAZODONE HYDROCHLORIDE 150 MG: 100 TABLET ORAL at 21:35

## 2019-08-22 RX ADMIN — BACITRACIN, NEOMYCIN, POLYMYXIN B 1 SMALL APPLICATION: 400; 3.5; 5 OINTMENT TOPICAL at 15:40

## 2019-08-22 RX ADMIN — PRAZOSIN HYDROCHLORIDE 1 MG: 1 CAPSULE ORAL at 21:35

## 2019-08-22 RX ADMIN — GABAPENTIN 600 MG: 300 CAPSULE ORAL at 15:44

## 2019-08-22 RX ADMIN — LORAZEPAM 1 MG: 1 TABLET ORAL at 15:44

## 2019-08-22 RX ADMIN — LITHIUM CARBONATE 450 MG: 450 TABLET, EXTENDED RELEASE ORAL at 21:35

## 2019-08-22 RX ADMIN — GABAPENTIN 600 MG: 300 CAPSULE ORAL at 08:23

## 2019-08-22 RX ADMIN — GABAPENTIN 600 MG: 300 CAPSULE ORAL at 21:35

## 2019-08-22 RX ADMIN — DULOXETINE HYDROCHLORIDE 60 MG: 60 CAPSULE, DELAYED RELEASE ORAL at 08:23

## 2019-08-22 NOTE — PLAN OF CARE
Patient with brighter affect upon approach  Showered and appears to have improved hygiene  Patient reports improved anxiety 2/4 and improved depression 3/10  Patient also reports "earlier in the morning having passive SI with no plan" Patient stated the passive thoughts are more frequent when anxiety is higher  Patient requesting neosporin for sores on either side of her mouth  Will make doctor aware  Med and meal compliant  Visible and social on the unit  Will continue to monitor and provide therapeutic support

## 2019-08-22 NOTE — PROGRESS NOTES
08/22/19 0917   Team Meeting   Meeting Type Daily Rounds   Initial Conference Date 08/22/19   Team Members Present   Team Members Present Nurse;Physician;   Physician Team Member Dr Liberty Francis Management Team Member Sabino Eis, Berdine Kanner   Patient/Family Present   Patient Present No   Patient's Family Present No   Chart and labs were reviewed  Medications to be monitored  Patient more visible and social with peers  Was reporting SI on 8/21  Discharge date to be determined
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
## 2019-08-22 NOTE — PROGRESS NOTES
Patient received at 2300 in bed and asleep  Patient slept eight hours through the night and did not awaken to report any problems or distress, or request any PRN's  Patient slept for entire shift with no issues, and was observed sleeping, with eyes closed, chest movements noted, and breathing heard  Q7 minute rounding continued for patient safety  Will continue to monitor
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
## 2019-08-22 NOTE — PROGRESS NOTES
Pt came up to med room requesting neosporin and something for anxiety  Pt scored a 18 on the [address] which qualified her for Ativan 1 mg PRN at [id number]  Will reassess in an hour

## 2019-08-22 NOTE — SOCIAL WORK
Patient requested someone from [facility] come out and speak with her but due to it being a [facility] source not available  Worker contacted [facility] [place] to inquire about an individual coming to see patient to provide additional resources, they will send someone out
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
## 2019-08-22 NOTE — PROGRESS NOTES
Progress Note - Behavioral Health     Bran Wong 38 y o  female MRN: [id number]   Unit/Bed#: U 379-01 Encounter: [id number]    Behavior over the last 24 hours: minimal improvement  Isabel was seen and evaluated for continuing care and case was discussed with the treatment team  She met with a staff from [facility] and reported being satisfied with the meeting because she felt validated  She continues to report anxiety symptoms and depressive symptoms, still feels helpless, hopeless and overwhelmed, continues to report negative thoughts and suicidal thoughts today  Has been taking medications  Attends group therapy  Social with peers  Sleep: insomnia, nightmares  Appetite: normal  Medication side effects: No   ROS: no complaints    Mental Status Evaluation:    Appearance:  casually dressed   Behavior:  cooperative   Speech:  normal rate and volume   Mood:  depressed, anxious   Affect:  tearful   Thought Process:  linear   Associations: intact associations   Thought Content:  no overt delusions   Perceptual Disturbances: no auditory hallucinations   Risk Potential: Suicidal ideation - Yes, contracts for safety on the unit  Homicidal ideation - None at present  Potential for aggression - No   Sensorium:  oriented to person, place and time/date   Memory:  recent and remote memory grossly intact   Consciousness:  alert and awake   Attention: attention span and concentration are age appropriate   Insight:  fair   Judgment: fair   Gait/Station: normal gait/station   Motor Activity: no abnormal movements     Vital signs in last 24 hours:    Temp:  [97 9 °F (36 6 °C)-98 8 °F (37 1 °C)] 98 8 °F (37 1 °C)  HR:  [58-67] 67  Resp:  [16-18] 18  BP: (108-118)/(68-73) 108/68    Laboratory results:  I have personally reviewed all pertinent laboratory/tests results      Progress Toward Goals: slight improvement    Assessment/Plan   Principal Problem:    Bipolar II disorder (HCC)  Active Problems:    HEAVEN (generalized anxiety disorder)    Recommended Treatment:     Planned medication and treatment changes: All current active medications have been reviewed  Encourage group therapy, milieu therapy and occupational therapy  Behavioral Health checks every 7 minutes  Start Minipress  Continue all other medications:    Current Facility-Administered Medications:  acetaminophen 650 mg Oral Q6H PRN Mala Spain MD   aluminum-magnesium hydroxide-simethicone 30 mL Oral Q4H PRN Mala Spain MD   benztropine 1 mg Intramuscular Q6H PRN Mala Spain MD   benztropine 1 mg Oral Q6H PRN Mala Spain MD   DULoxetine 60 mg Oral Daily Lisa Gamez MD   gabapentin 600 mg Oral TID Cassandra Mathew MD   haloperidol 5 mg Oral Q6H PRN Mala Spain MD   haloperidol lactate 5 mg Intramuscular Q6H PRN Mala Spain MD   lithium carbonate 450 mg Oral Q12H [address] Lisa Gamez MD   LORazepam 2 mg Intramuscular Q6H PRN Mala Spain MD   LORazepam 1 mg Oral Q6H PRN Mala Spain MD   magnesium hydroxide 30 mL Oral Daily PRN Mala Spain MD   neomycin-bacitracin-polymyxin b 1 small application Topical BID PRN Lisa Gamez MD   nicotine 1 patch Transdermal Daily Mala Spain MD   traZODone 150 mg Oral HS Lisa Gamez MD   traZODone 50 mg Oral HS PRN Mala Spain MD       Risks / Benefits of Treatment:    Risks, benefits, and possible side effects of medications explained to patient and patient verbalizes understanding and agreement for treatment  Counseling / Coordination of Care:    Patient's progress discussed with staff in treatment team meeting  Medications, treatment progress and treatment plan reviewed with patient  Medication education provided to patient  Reassurance and supportive therapy provided      Lisa Gamez MD 08/22/19

## 2019-08-23 PROCEDURE — 99232 SBSQ HOSP IP/OBS MODERATE 35: CPT | Performed by: PSYCHIATRY & NEUROLOGY

## 2019-08-23 RX ORDER — LORAZEPAM 2 MG/ML
2 INJECTION INTRAMUSCULAR EVERY 6 HOURS PRN
Status: DISCONTINUED | OUTPATIENT
Start: 2019-08-23 | End: 2019-08-29 | Stop reason: HOSPADM

## 2019-08-23 RX ORDER — LORAZEPAM 1 MG/1
1 TABLET ORAL EVERY 6 HOURS PRN
Status: DISCONTINUED | OUTPATIENT
Start: 2019-08-23 | End: 2019-08-29 | Stop reason: HOSPADM

## 2019-08-23 RX ADMIN — GABAPENTIN 600 MG: 300 CAPSULE ORAL at 08:25

## 2019-08-23 RX ADMIN — TRAZODONE HYDROCHLORIDE 150 MG: 100 TABLET ORAL at 21:23

## 2019-08-23 RX ADMIN — DULOXETINE HYDROCHLORIDE 60 MG: 60 CAPSULE, DELAYED RELEASE ORAL at 08:25

## 2019-08-23 RX ADMIN — PRAZOSIN HYDROCHLORIDE 1 MG: 1 CAPSULE ORAL at 21:24

## 2019-08-23 RX ADMIN — LITHIUM CARBONATE 450 MG: 450 TABLET, EXTENDED RELEASE ORAL at 21:23

## 2019-08-23 RX ADMIN — LITHIUM CARBONATE 450 MG: 450 TABLET, EXTENDED RELEASE ORAL at 08:25

## 2019-08-23 RX ADMIN — GABAPENTIN 600 MG: 300 CAPSULE ORAL at 16:53

## 2019-08-23 RX ADMIN — GABAPENTIN 600 MG: 300 CAPSULE ORAL at 21:23

## 2019-08-23 NOTE — PROGRESS NOTES
Progress Note - Behavioral Health     Mayra Shabazz 38 y o  female MRN: [id number]   Unit/Bed#: U 379-01 Encounter: [id number]    Behavior over the last 24 hours: minimal improvement  Isabel  Was seen and evaluated for continuing care and case was discussed with the treatment team  She appears more calm and cooperative, continues to report anxiety symptoms, depressive symptoms and intrusive thoughts, has negative thoughts and suicidal thoughts today  Has been taking medications  Attends group therapy  Interacts appropriately with peers  Socializes with peers  Sleep: improved  Appetite: normal  Medication side effects: No   ROS: no complaints    Mental Status Evaluation:    Appearance:  casually dressed   Behavior:  cooperative, calm   Speech:  normal rate and volume   Mood:  depressed, anxious   Affect:  constricted   Thought Process:  linear   Associations: intact associations   Thought Content:  no overt delusions   Perceptual Disturbances: no auditory hallucinations   Risk Potential: Suicidal ideation - Yes, without plan, contracts for safety on the unit, would talk to staff if not feeling safe on the unit  Homicidal ideation - None  Potential for aggression - No   Sensorium:  oriented to person, place and time/date   Memory:  recent and remote memory grossly intact   Consciousness:  alert and awake   Attention: attention span and concentration are age appropriate   Insight:  improved   Judgment: improved   Gait/Station: normal gait/station   Motor Activity: no abnormal movements     Vital signs in last 24 hours:    Temp:  [98 °F (36 7 °C)-98 4 °F (36 9 °C)] 98 4 °F (36 9 °C)  HR:  [54-65] 58  Resp:  [16] 16  BP: ()/(54-65) 98/56    Laboratory results:  I have personally reviewed all pertinent laboratory/tests results      Progress Toward Goals: continues to improve    Assessment/Plan   Principal Problem:    Bipolar II disorder (HCC)  Active Problems:    HEAVEN (generalized anxiety disorder)    Recommended Treatment:     Planned medication and treatment changes: All current active medications have been reviewed  Encourage group therapy, milieu therapy and occupational therapy  Behavioral Health checks every 7 minutes  Recheck Lithium level and CMP in the morning    Current Facility-Administered Medications:  acetaminophen 650 mg Oral Q6H PRN Michael Ewing MD   aluminum-magnesium hydroxide-simethicone 30 mL Oral Q4H PRN Michael Ewing MD   benztropine 1 mg Intramuscular Q6H PRN Michael Ewing MD   benztropine 1 mg Oral Q6H PRN Michael Ewing MD   DULoxetine 60 mg Oral Daily Charles Orr MD   gabapentin 600 mg Oral TID Nicolas Mohr MD   haloperidol 5 mg Oral Q6H PRROXY Ewing MD   haloperidol lactate 5 mg Intramuscular Q6H PRN Michael Ewing MD   lithium carbonate 450 mg Oral Q12H [address] Charles Orr MD   LORazepam 2 mg Intramuscular Q6H PRN Michael Ewing MD   LORazepam 1 mg Oral Q6H PRN Michael Ewing MD   magnesium hydroxide 30 mL Oral Daily PRN Michael Ewing MD   neomycin-bacitracin-polymyxin b 1 small application Topical BID PRN Charles Orr MD   nicotine 1 patch Transdermal Daily Michael Ewing MD   prazosin 1 mg Oral HS Charles Orr MD   traZODone 150 mg Oral HS Charles Orr MD   traZODone 50 mg Oral HS PRROXY Ewing MD       Risks / Benefits of Treatment:    Risks, benefits, and possible side effects of medications explained to patient and patient verbalizes understanding and agreement for treatment  Counseling / Coordination of Care:    Patient's progress discussed with staff in treatment team meeting  Medications, treatment progress and treatment plan reviewed with patient  Reassurance and supportive therapy provided  Encouraged participation in milieu and group therapy on the unit  Crisis/safety plan discussed with patient      Charles Orr MD 08/23/19

## 2019-08-23 NOTE — PLAN OF CARE
Patient pleasant upon approach  Patient denies anxiety, depression, S/HI,A/VH and pain  Med and meal compliant  Visible and social with peers on the unit   Patient states " I will be depressed later because my friends are leaving" Will continue to monitor and provide therapeutic support

## 2019-08-23 NOTE — PROGRESS NOTES
Pt has been pleasant and cooperative, visible/social  Pt is med/meal compliant  Pt reports a 3/4 anxiety and 5/10 depression  Pt also reported some bouts of SI but no plan  Pt does contract for safety  Pt denies any HI, AH, or VH  Pt scored a 17 on the [facility] Anxiety Scale, which warranted Ativan 1 mg PRN at 2208  Will continue to monitor
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
## 2019-08-23 NOTE — PLAN OF CARE
Problem: Risk for Self Injury/Neglect  Goal: Treatment Goal: Remain safe during length of stay, learn and adopt new coping skills, and be free of self-injurious ideation, impulses and acts at the time of discharge  Outcome: Progressing  Goal: Verbalize thoughts and feelings  Description  Interventions:  - Assess and re-assess patient's lethality and potential for self-injury  - Engage patient in 1:1 interactions, daily, for a minimum of 15 minutes  - Encourage patient to express feelings, fears, frustrations, hopes  - Establish rapport/trust with patient   Outcome: Progressing  Goal: Refrain from harming self  Description  Interventions:  - Monitor patient closely, per order  - Develop a trusting relationship  - Supervise medication ingestion, monitor effects and side effects   Outcome: Progressing  Goal: Attend and participate in unit activities, including therapeutic, recreational, and educational groups  Description  Interventions:  - Provide therapeutic and educational activities daily, encourage attendance and participation, and document same in the medical record  - Obtain collateral information, encourage visitation and family involvement in care   Outcome: Progressing  Goal: Recognize maladaptive responses and adopt new coping mechanisms  Outcome: Progressing  Goal: Complete daily ADLs, including personal hygiene independently, as able  Description  Interventions:  - Observe, teach, and assist patient with ADLS  - Monitor and promote a balance of rest/activity, with adequate nutrition and elimination  Outcome: Progressing     Problem: Depression  Goal: Treatment Goal: Demonstrate behavioral control of depressive symptoms, verbalize feelings of improved mood/affect, and adopt new coping skills prior to discharge  Outcome: Progressing  Goal: Verbalize thoughts and feelings  Description  Interventions:  - Assess and re-assess patient's level of risk   - Engage patient in 1:1 interactions, daily, for a minimum of 15 minutes   - Encourage patient to express feelings, fears, frustrations, hopes   Outcome: Progressing  Goal: Refrain from harming self  Description  Interventions:  - Monitor patient closely, per order   - Supervise medication ingestion, monitor effects and side effects   Outcome: Progressing  Goal: Refrain from isolation  Description  Interventions:  - Develop a trusting relationship   - Encourage socialization   Outcome: Progressing  Goal: Refrain from self-neglect  Outcome: Progressing  Goal: Attend and participate in unit activities, including therapeutic, recreational, and educational groups  Description  Interventions:  - Provide therapeutic and educational activities daily, encourage attendance and participation, and document same in the medical record   Outcome: Progressing  Goal: Complete daily ADLs, including personal hygiene independently, as able  Description  Interventions:  - Observe, teach, and assist patient with ADLS  -  Monitor and promote a balance of rest/activity, with adequate nutrition and elimination   Outcome: Progressing     Problem: Anxiety  Goal: Anxiety is at manageable level  Description  Interventions:  - Assess and monitor patient's anxiety level  - Monitor for signs and symptoms (heart palpitations, chest pain, shortness of breath, headaches, nausea, feeling jumpy, restlessness, irritable, apprehensive)  - Collaborate with interdisciplinary team and initiate plan and interventions as ordered    - [place] patient to unit/surroundings  - Explain treatment plan  - Encourage participation in care  - Encourage verbalization of concerns/fears  - Identify coping mechanisms  - Assist in developing anxiety-reducing skills  - Administer/offer alternative therapies  - Limit or eliminate stimulants  Outcome: Progressing

## 2019-08-24 LAB
ALBUMIN SERPL BCP-MCNC: 4.2 G/DL (ref 3–5.2)
ALP SERPL-CCNC: 71 U/L (ref 43–122)
ALT SERPL W P-5'-P-CCNC: 120 U/L (ref 9–52)
ANION GAP SERPL CALCULATED.3IONS-SCNC: 6 MMOL/L (ref 5–14)
AST SERPL W P-5'-P-CCNC: 56 U/L (ref 14–36)
BILIRUB SERPL-MCNC: 0.5 MG/DL
BUN SERPL-MCNC: 13 MG/DL (ref 5–25)
CALCIUM SERPL-MCNC: 9.8 MG/DL (ref 8.4–10.2)
CHLORIDE SERPL-SCNC: 103 MMOL/L (ref 97–108)
CO2 SERPL-SCNC: 28 MMOL/L (ref 22–30)
CREAT SERPL-MCNC: 0.61 MG/DL (ref 0.6–1.2)
GFR SERPL CREATININE-BSD FRML MDRD: 111 ML/MIN/1.73SQ M
GLUCOSE SERPL-MCNC: 103 MG/DL (ref 70–99)
LITHIUM SERPL-SCNC: 0.9 MMOL/L (ref 0.6–1.2)
POTASSIUM SERPL-SCNC: 4.1 MMOL/L (ref 3.6–5)
PROT SERPL-MCNC: 7.3 G/DL (ref 5.9–8.4)
SODIUM SERPL-SCNC: 137 MMOL/L (ref 137–147)

## 2019-08-24 PROCEDURE — 99232 SBSQ HOSP IP/OBS MODERATE 35: CPT | Performed by: NURSE PRACTITIONER

## 2019-08-24 PROCEDURE — 80053 COMPREHEN METABOLIC PANEL: CPT | Performed by: PSYCHIATRY & NEUROLOGY

## 2019-08-24 PROCEDURE — 80178 ASSAY OF LITHIUM: CPT | Performed by: PSYCHIATRY & NEUROLOGY

## 2019-08-24 RX ADMIN — TRAZODONE HYDROCHLORIDE 150 MG: 100 TABLET ORAL at 21:42

## 2019-08-24 RX ADMIN — LORAZEPAM 1 MG: 1 TABLET ORAL at 09:44

## 2019-08-24 RX ADMIN — GABAPENTIN 600 MG: 300 CAPSULE ORAL at 21:43

## 2019-08-24 RX ADMIN — PRAZOSIN HYDROCHLORIDE 1 MG: 1 CAPSULE ORAL at 21:43

## 2019-08-24 RX ADMIN — GABAPENTIN 600 MG: 300 CAPSULE ORAL at 16:22

## 2019-08-24 RX ADMIN — LITHIUM CARBONATE 450 MG: 450 TABLET, EXTENDED RELEASE ORAL at 21:43

## 2019-08-24 RX ADMIN — GABAPENTIN 600 MG: 300 CAPSULE ORAL at 09:19

## 2019-08-24 RX ADMIN — DULOXETINE HYDROCHLORIDE 60 MG: 60 CAPSULE, DELAYED RELEASE ORAL at 09:19

## 2019-08-24 RX ADMIN — LITHIUM CARBONATE 450 MG: 450 TABLET, EXTENDED RELEASE ORAL at 09:19

## 2019-08-24 NOTE — PROGRESS NOTES
08/24/19 0900   Team Meeting   Meeting Type Daily Rounds   Initial Conference Date 08/24/19   Team Members Present   Team Members Present Physician;Nurse   Physician Team Member [id number] Team Member Kennedy Avila   Patient/Family Present   Patient Present No   Patient's Family Present No   Medical, labs and medications reviewed by team  Discharge to be determined by attending  Medication changes to be assessed

## 2019-08-24 NOTE — PROGRESS NOTES
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 there is a peer that is draining her and making her anxious  She stated she didn't mind helping peer by talking to her but that peer was super hyper and there were times when peer would go overboard  Patient given PRN and has remained controlled and continues interacting with peers appropriately  Patient denied suicidal ideation at this time but stated that thoughts cross her mind, she denies a plan and stated that the thoughts are not suicidal thoughts but she thinks of suicide  She denies homicidal ideations, she denies audio or visual hallucinations  Will continue to monitor for changes in current status

## 2019-08-24 NOTE — PROGRESS NOTES
Progress Note - Behavioral Health   Aleyda Reid 38 y o  female MRN: [id number]  Unit/Bed#: [facility] 379-01 Encounter: [id number]    Assessment/Plan   Principal Problem:    Bipolar II disorder ([address] )  Active Problems:    HEAVEN (generalized anxiety disorder)      Subjective:Patient was seen today for continuation of care, records reviewed and  patient was discussed with the morning case review team  Patient reports she woke up early at 4 am today after having a panic attack and nightmare  She reported a decreased in vivid dreams during the days  Reports having a good appetite and mood is improving, socializes with peers and has been visible in the milieu  Patient denies endorsing any suicidal or homicidal ideation  Does not seem to be experiencing any manic or psychotic symptoms during our encounter  Remains medication compliance  Denies any side effects from medications  Lithium level 0 9 today       Current Medications:  Current Facility-Administered Medications   Medication Dose Route Frequency    acetaminophen (TYLENOL) tablet 650 mg  650 mg Oral Q6H PRN    aluminum-magnesium hydroxide-simethicone (MYLANTA) 200-200-20 mg/5 mL oral suspension 30 mL  30 mL Oral Q4H PRN    benztropine (COGENTIN) injection 1 mg  1 mg Intramuscular Q6H PRN    benztropine (COGENTIN) tablet 1 mg  1 mg Oral Q6H PRN    DULoxetine (CYMBALTA) delayed release capsule 60 mg  60 mg Oral Daily    gabapentin (NEURONTIN) capsule 600 mg  600 mg Oral TID    haloperidol (HALDOL) tablet 5 mg  5 mg Oral Q6H PRN    haloperidol lactate (HALDOL) injection 5 mg  5 mg Intramuscular Q6H PRN    lithium carbonate (LITHOBID) CR tablet 450 mg  450 mg Oral Q12H KINA    LORazepam (ATIVAN) 2 mg/mL injection 2 mg  2 mg Intramuscular Q6H PRN    LORazepam (ATIVAN) tablet 1 mg  1 mg Oral Q6H PRN    magnesium hydroxide (MILK OF MAGNESIA) 400 mg/5 mL oral suspension 30 mL  30 mL Oral Daily PRN    neomycin-bacitracin-polymyxin b (NEOSPORIN) ointment 1 small application  1 small application Topical BID PRN    nicotine (NICODERM CQ) 21 mg/24 hr TD 24 hr patch 1 patch  1 patch Transdermal Daily    nicotine polacrilex (NICORETTE) gum 2 mg  2 mg Oral Q4H PRN    prazosin (MINIPRESS) capsule 1 mg  1 mg Oral HS    traZODone (DESYREL) tablet 150 mg  150 mg Oral HS    traZODone (DESYREL) tablet 50 mg  50 mg Oral HS PRN       Behavioral Health Medications: all current active meds have been reviewed and continue current psychiatric medications  Vitals:  Vitals:    08/24/19 1622   BP: 120/75   Pulse: 68   Resp: 16   Temp: 98 °F (36 7 °C)   SpO2:        Laboratory results:    I have personally reviewed all pertinent laboratory/tests results  Most Recent Labs:   Lab Results   Component Value Date    WBC 7 60 08/16/2019    RBC 4 79 08/16/2019    HGB 14 9 08/16/2019    HCT 44 4 08/16/2019     08/16/2019    RDW 15 7 (H) 08/16/2019    NEUTROABS 3 70 08/16/2019    SODIUM 137 08/24/2019    K 4 1 08/24/2019     08/24/2019    CO2 28 08/24/2019    BUN 13 08/24/2019    CREATININE 0 61 08/24/2019    GLUC 103 (H) 08/24/2019    CALCIUM 9 8 08/24/2019    AST 56 (H) 08/24/2019     (H) 08/24/2019    ALKPHOS 71 08/24/2019    TP 7 3 08/24/2019    ALB 4 2 08/24/2019    TBILI 0 50 08/24/2019    CHOLESTEROL 193 02/20/2019    HDL 28 (L) 02/20/2019    TRIG 323 (H) 02/20/2019    LDLCALC 100 02/20/2019    NONHDLC 165 02/20/2019    LITHIUM 0 9 08/24/2019    AFW4WBRWAOFN 3 230 02/19/2019    RPR Non-Reactive 02/20/2019       Psychiatric Review of Systems:    Behavior over the last 24 hours:  improved     Sleep: normal  Appetite: normal  Medication side effects: No  ROS: no complaints    Mental Status Evaluation:    Appearance:  casually dressed, dressed appropriately   Behavior:  pleasant, cooperative, calm   Speech:  normal rate, normal volume   Mood:  depressed   Affect:  blunted, increased in intensity   Thought Process:  logical, coherent, goal directed, linear   Associations: intact associations   Thought Content:  normal, no overt delusions   Perceptual Disturbances: no auditory hallucinations, no visual hallucinations   Risk Potential: Suicidal ideation - None at present  Homicidal ideation - None  Potential for aggression - No   Sensorium:  oriented to person, place, time/date and situation   Memory:  recent and remote memory grossly intact   Consciousness:  alert and awake   Attention: attention span and concentration are age appropriate   Insight:  good   Judgment: good   Gait/Station: normal gait/station, normal balance   Motor Activity: no abnormal movements       Progress Toward Goals:     Minimal improvement     Assessment/Plan   Principal Problem:    Bipolar II disorder (HCC)  Active Problems:    HEAVEN (generalized anxiety disorder)      Recommended Treatment: Continue with group therapy, milieu therapy and occupational therapy  Treatment plan and medication changes discussed and per the attending physician the plan is: 1  Behavioral Health checks every 7 minutes  2  Continue with current medication regimen  3  Continue with Cymbalta 60 mg daily, Neurontin 600 mg TID, Lithium 450 mg BID daily, Minipress 1 mg daily at HS, Trazodone 150 mg daily at HS  Risks / Benefits of Treatment:     Risks, benefits, and possible side effects of medications explained to patient and patient verbalizes understanding and agreement for treatment  Counseling / Coordination of Care:     Patient's progress reviewed with nursing staff  Medications, treatment progress and treatment plan reviewed with patient  Supportive counseling provided to the patient            Cristi Galvez

## 2019-08-25 PROCEDURE — 99232 SBSQ HOSP IP/OBS MODERATE 35: CPT | Performed by: NURSE PRACTITIONER

## 2019-08-25 RX ADMIN — GABAPENTIN 600 MG: 300 CAPSULE ORAL at 09:20

## 2019-08-25 RX ADMIN — LITHIUM CARBONATE 450 MG: 450 TABLET, EXTENDED RELEASE ORAL at 21:44

## 2019-08-25 RX ADMIN — LITHIUM CARBONATE 450 MG: 450 TABLET, EXTENDED RELEASE ORAL at 09:20

## 2019-08-25 RX ADMIN — TRAZODONE HYDROCHLORIDE 150 MG: 100 TABLET ORAL at 21:44

## 2019-08-25 RX ADMIN — DULOXETINE HYDROCHLORIDE 60 MG: 60 CAPSULE, DELAYED RELEASE ORAL at 09:20

## 2019-08-25 RX ADMIN — PRAZOSIN HYDROCHLORIDE 1 MG: 1 CAPSULE ORAL at 21:44

## 2019-08-25 RX ADMIN — GABAPENTIN 600 MG: 300 CAPSULE ORAL at 21:44

## 2019-08-25 RX ADMIN — GABAPENTIN 600 MG: 300 CAPSULE ORAL at 17:37

## 2019-08-25 NOTE — PLAN OF CARE
Problem: Risk for Self Injury/Neglect  Goal: Verbalize thoughts and feelings  Description  Interventions:  - Assess and re-assess patient's lethality and potential for self-injury  - Engage patient in 1:1 interactions, daily, for a minimum of 15 minutes  - Encourage patient to express feelings, fears, frustrations, hopes  - Establish rapport/trust with patient   Outcome: Progressing  Goal: Refrain from harming self  Description  Interventions:  - Monitor patient closely, per order  - Develop a trusting relationship  - Supervise medication ingestion, monitor effects and side effects   Outcome: Progressing  Goal: Attend and participate in unit activities, including therapeutic, recreational, and educational groups  Description  Interventions:  - Provide therapeutic and educational activities daily, encourage attendance and participation, and document same in the medical record  - Obtain collateral information, encourage visitation and family involvement in care   Outcome: Progressing   Patient is visible on unit, interacting with staff and peers appropriately  Med and meal compliant  No complaints  Will continue to monitor for changes in current status

## 2019-08-25 NOTE — PROGRESS NOTES
08/25/19 1600   Team Meeting   Meeting Type Daily Rounds   Initial Conference Date 08/25/19   Team Members Present   Team Members Present Physician;Nurse   Physician Team Member [id number] Team Member Miquel Tang   Patient/Family Present   Patient Present No   Patient's Family Present No   Medical, labs and medications reviewed by team  Discharge to be determined by attending  Medication changes to be assessed

## 2019-08-25 NOTE — PLAN OF CARE
Problem: Risk for Self Injury/Neglect  Goal: Verbalize thoughts and feelings  Description  Interventions:  - Assess and re-assess patient's lethality and potential for self-injury  - Engage patient in 1:1 interactions, daily, for a minimum of 15 minutes  - Encourage patient to express feelings, fears, frustrations, hopes  - Establish rapport/trust with patient   8/24/2019 2310 by Linette Lester RN  Outcome: Progressing  8/24/2019 2254 by Linette Lester RN  Outcome: Progressing  Goal: Refrain from harming self  Description  Interventions:  - Monitor patient closely, per order  - Develop a trusting relationship  - Supervise medication ingestion, monitor effects and side effects   8/24/2019 2310 by Linetet Lester RN  Outcome: Progressing  8/24/2019 2254 by Linette Lester RN  Outcome: Progressing  Goal: Attend and participate in unit activities, including therapeutic, recreational, and educational groups  Description  Interventions:  - Provide therapeutic and educational activities daily, encourage attendance and participation, and document same in the medical record  - Obtain collateral information, encourage visitation and family involvement in care   Outcome: Progressing   Patient visible on unit interacting with peers  No behavioral outbursts, remained controlled

## 2019-08-25 NOTE — PLAN OF CARE
Problem: Risk for Self Injury/Neglect  Goal: Verbalize thoughts and feelings  Description  Interventions:  - Assess and re-assess patient's lethality and potential for self-injury  - Engage patient in 1:1 interactions, daily, for a minimum of 15 minutes  - Encourage patient to express feelings, fears, frustrations, hopes  - Establish rapport/trust with patient   Outcome: Progressing  Goal: Refrain from harming self  Description  Interventions:  - Monitor patient closely, per order  - Develop a trusting relationship  - Supervise medication ingestion, monitor effects and side effects   Outcome: Progressing     Problem: Depression  Goal: Refrain from harming self  Description  Interventions:  - Monitor patient closely, per order   - Supervise medication ingestion, monitor effects and side effects   Outcome: Progressing  Goal: Refrain from isolation  Description  Interventions:  - Develop a trusting relationship   - Encourage socialization   Outcome: Progressing     Problem: Anxiety  Goal: Anxiety is at manageable level  Description  Interventions:  - Assess and monitor patient's anxiety level  - Monitor for signs and symptoms (heart palpitations, chest pain, shortness of breath, headaches, nausea, feeling jumpy, restlessness, irritable, apprehensive)  - Collaborate with interdisciplinary team and initiate plan and interventions as ordered  - [place] patient to unit/surroundings  - Explain treatment plan  - Encourage participation in care  - Encourage verbalization of concerns/fears  - Identify coping mechanisms  - Assist in developing anxiety-reducing skills  - Administer/offer alternative therapies  - Limit or eliminate stimulants  Outcome: Progressing   Patient visible on unit  Med and meal compliant  Patient is pleasant upon approach  No behavioral outbursts, remains controlled  She denies Si, Hi, audio or visual hallucinations, anxiety or depression  Will continue to monitor for changes in current status

## 2019-08-25 NOTE — NURSING NOTE
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

## 2019-08-25 NOTE — PROGRESS NOTES
Progress Note - Behavioral Health   Christian De Dios 38 y o  female MRN: [id number]  Unit/Bed#: [facility] 379-01 Encounter: [id number]    Assessment/Plan   Principal Problem:    Bipolar II disorder ([address] )  Active Problems:    HEAVEN (generalized anxiety disorder)      Subjective:Patient was seen today for continuation of care, records reviewed and  patient was discussed with the morning case review team Patient reports she again woke up early after 4  am today when her roommate  vomited  Continues to report a decreased in vivid dreams during the days  Reports having a good appetite and mood is improving, socializes with peers and has been visible in the milieu  Attended the yoga and the morning groups today  Anxiety has decreased and she has not be using prns  Feels her depression and anxiety "spike" at times but are well controlled  Patient denies endorsing any suicidal or homicidal ideation  Does not seem to be experiencing any manic or psychotic symptoms during our encounter  Remains medication compliance  Denies any side effects from medications  Lithium level 0 9 yesterday         Current Medications:  Current Facility-Administered Medications   Medication Dose Route Frequency    acetaminophen (TYLENOL) tablet 650 mg  650 mg Oral Q6H PRN    aluminum-magnesium hydroxide-simethicone (MYLANTA) 200-200-20 mg/5 mL oral suspension 30 mL  30 mL Oral Q4H PRN    benztropine (COGENTIN) injection 1 mg  1 mg Intramuscular Q6H PRN    benztropine (COGENTIN) tablet 1 mg  1 mg Oral Q6H PRN    DULoxetine (CYMBALTA) delayed release capsule 60 mg  60 mg Oral Daily    gabapentin (NEURONTIN) capsule 600 mg  600 mg Oral TID    haloperidol (HALDOL) tablet 5 mg  5 mg Oral Q6H PRN    haloperidol lactate (HALDOL) injection 5 mg  5 mg Intramuscular Q6H PRN    lithium carbonate (LITHOBID) CR tablet 450 mg  450 mg Oral Q12H KINA    LORazepam (ATIVAN) 2 mg/mL injection 2 mg  2 mg Intramuscular Q6H PRN    LORazepam (ATIVAN) tablet 1 mg  1 mg Oral Q6H PRN    magnesium hydroxide (MILK OF MAGNESIA) 400 mg/5 mL oral suspension 30 mL  30 mL Oral Daily PRN    neomycin-bacitracin-polymyxin b (NEOSPORIN) ointment 1 small application  1 small application Topical BID PRN    nicotine (NICODERM CQ) 21 mg/24 hr TD 24 hr patch 1 patch  1 patch Transdermal Daily    nicotine polacrilex (NICORETTE) gum 2 mg  2 mg Oral Q4H PRN    prazosin (MINIPRESS) capsule 1 mg  1 mg Oral HS    traZODone (DESYREL) tablet 150 mg  150 mg Oral HS    traZODone (DESYREL) tablet 50 mg  50 mg Oral HS PRN       Behavioral Health Medications: all current active meds have been reviewed and continue current psychiatric medications  Vitals:  Vitals:    08/25/19 0701   BP: 118/68   Pulse: 61   Resp:    Temp:    SpO2:        Laboratory results:    I have personally reviewed all pertinent laboratory/tests results  Most Recent Labs:   Lab Results   Component Value Date    WBC 7 60 08/16/2019    RBC 4 79 08/16/2019    HGB 14 9 08/16/2019    HCT 44 4 08/16/2019     08/16/2019    RDW 15 7 (H) 08/16/2019    NEUTROABS 3 70 08/16/2019    SODIUM 137 08/24/2019    K 4 1 08/24/2019     08/24/2019    CO2 28 08/24/2019    BUN 13 08/24/2019    CREATININE 0 61 08/24/2019    GLUC 103 (H) 08/24/2019    CALCIUM 9 8 08/24/2019    AST 56 (H) 08/24/2019     (H) 08/24/2019    ALKPHOS 71 08/24/2019    TP 7 3 08/24/2019    ALB 4 2 08/24/2019    TBILI 0 50 08/24/2019    CHOLESTEROL 193 02/20/2019    HDL 28 (L) 02/20/2019    TRIG 323 (H) 02/20/2019    LDLCALC 100 02/20/2019    NONHDLC 165 02/20/2019    LITHIUM 0 9 08/24/2019    [id number] 3 230 02/19/2019    RPR Non-Reactive 02/20/2019       Psychiatric Review of Systems:    Behavior over the last 24 hours:  improved     Sleep: normal  Appetite: normal  Medication side effects: No  ROS: no complaints    Mental Status Evaluation:    Appearance:  casually dressed, dressed appropriately   Behavior:  pleasant, cooperative, calm   Speech:  normal rate, normal volume   Mood:  depressed   Affect:  blunted, increased in intensity   Thought Process:  logical, coherent, goal directed, linear   Associations: intact associations   Thought Content:  normal, no overt delusions   Perceptual Disturbances: no auditory hallucinations, no visual hallucinations   Risk Potential: Suicidal ideation - None at present  Homicidal ideation - None  Potential for aggression - No   Sensorium:  oriented to person, place, time/date and situation   Memory:  recent and remote memory grossly intact   Consciousness:  alert and awake   Attention: attention span and concentration are age appropriate   Insight:  good   Judgment: good   Gait/Station: normal gait/station, normal balance   Motor Activity: no abnormal movements         Progress Toward Goals:     Improving     Assessment/Plan   Principal Problem:    Bipolar II disorder (HCC)  Active Problems:    HEAVEN (generalized anxiety disorder)      Recommended Treatment: Continue with group therapy, milieu therapy and occupational therapy  Treatment plan and medication changes discussed and per the attending physician the plan is: 1  Behavioral Health checks every 7 minutes  2  Continue with current medication regimen  3  Continue with Cymbalta 60 mg daily, Neurontin 600 mg TID, Lithium 450 mg BID daily, Minipress 1 mg daily at HS, Trazodone 150 mg daily at HS  Risks / Benefits of Treatment:     Risks, benefits, and possible side effects of medications explained to patient and patient verbalizes understanding and agreement for treatment  Counseling / Coordination of Care:     Patient's progress reviewed with nursing staff  Medications, treatment progress and treatment plan reviewed with patient  Supportive counseling provided to the patient            Allison Razo

## 2019-08-26 PROCEDURE — 99232 SBSQ HOSP IP/OBS MODERATE 35: CPT | Performed by: PSYCHIATRY & NEUROLOGY

## 2019-08-26 RX ADMIN — PRAZOSIN HYDROCHLORIDE 1 MG: 1 CAPSULE ORAL at 21:36

## 2019-08-26 RX ADMIN — GABAPENTIN 600 MG: 300 CAPSULE ORAL at 21:36

## 2019-08-26 RX ADMIN — TRAZODONE HYDROCHLORIDE 150 MG: 100 TABLET ORAL at 21:36

## 2019-08-26 RX ADMIN — GABAPENTIN 600 MG: 300 CAPSULE ORAL at 17:48

## 2019-08-26 RX ADMIN — DULOXETINE HYDROCHLORIDE 60 MG: 60 CAPSULE, DELAYED RELEASE ORAL at 08:50

## 2019-08-26 RX ADMIN — BACITRACIN, NEOMYCIN, POLYMYXIN B 1 SMALL APPLICATION: 400; 3.5; 5 OINTMENT TOPICAL at 18:57

## 2019-08-26 RX ADMIN — GABAPENTIN 600 MG: 300 CAPSULE ORAL at 08:49

## 2019-08-26 RX ADMIN — LITHIUM CARBONATE 450 MG: 450 TABLET, EXTENDED RELEASE ORAL at 08:50

## 2019-08-26 RX ADMIN — LITHIUM CARBONATE 450 MG: 450 TABLET, EXTENDED RELEASE ORAL at 21:36

## 2019-08-26 NOTE — PROGRESS NOTES
[facility] GROUP NOTE     08/26/19 1115   Activity/Group Checklist   Group Personal control  (Self Esteem/Relaxation)   Attendance Attended   Attendance Duration (min) 16-30   Interactions Interacted appropriately   Affect/Mood Appropriate   Goals Achieved Able to listen to others; Able to engage in interactions; Discussed self-esteem issues; Able to give feedback to another; Other (Comment)  (Reported intention to carryover + affirmation after discharg)

## 2019-08-26 NOTE — NURSING NOTE
Patient is compliant with medication and shabazz routine   She is friendly with all her peers  Patient has a bad habit by talking to peers about other patients which causes problems on the unit This was explained to patient that  It is not acceptable to talk about peer to other peers    She does  Go to groups and is visible most of the time

## 2019-08-26 NOTE — PLAN OF CARE
Problem: Risk for Self Injury/Neglect  Goal: Treatment Goal: Remain safe during length of stay, learn and adopt new coping skills, and be free of self-injurious ideation, impulses and acts at the time of discharge  Outcome: Progressing  Goal: Verbalize thoughts and feelings  Description  Interventions:  - Assess and re-assess patient's lethality and potential for self-injury  - Engage patient in 1:1 interactions, daily, for a minimum of 15 minutes  - Encourage patient to express feelings, fears, frustrations, hopes  - Establish rapport/trust with patient   Outcome: Progressing  Goal: Refrain from harming self  Description  Interventions:  - Monitor patient closely, per order  - Develop a trusting relationship  - Supervise medication ingestion, monitor effects and side effects   Outcome: Progressing  Goal: Attend and participate in unit activities, including therapeutic, recreational, and educational groups  Description  Interventions:  - Provide therapeutic and educational activities daily, encourage attendance and participation, and document same in the medical record  - Obtain collateral information, encourage visitation and family involvement in care   Outcome: Progressing  Goal: Recognize maladaptive responses and adopt new coping mechanisms  Outcome: Progressing  Goal: Complete daily ADLs, including personal hygiene independently, as able  Description  Interventions:  - Observe, teach, and assist patient with ADLS  - Monitor and promote a balance of rest/activity, with adequate nutrition and elimination  Outcome: Progressing     Problem: Depression  Goal: Treatment Goal: Demonstrate behavioral control of depressive symptoms, verbalize feelings of improved mood/affect, and adopt new coping skills prior to discharge  Outcome: Progressing  Goal: Verbalize thoughts and feelings  Description  Interventions:  - Assess and re-assess patient's level of risk   - Engage patient in 1:1 interactions, daily, for a minimum of 15 minutes   - Encourage patient to express feelings, fears, frustrations, hopes   Outcome: Progressing  Goal: Refrain from harming self  Description  Interventions:  - Monitor patient closely, per order   - Supervise medication ingestion, monitor effects and side effects   Outcome: Progressing  Goal: Refrain from isolation  Description  Interventions:  - Develop a trusting relationship   - Encourage socialization   Outcome: Progressing  Goal: Refrain from self-neglect  Outcome: Progressing  Goal: Attend and participate in unit activities, including therapeutic, recreational, and educational groups  Description  Interventions:  - Provide therapeutic and educational activities daily, encourage attendance and participation, and document same in the medical record   Outcome: Progressing  Goal: Complete daily ADLs, including personal hygiene independently, as able  Description  Interventions:  - Observe, teach, and assist patient with ADLS  -  Monitor and promote a balance of rest/activity, with adequate nutrition and elimination   Outcome: Progressing     Problem: Anxiety  Goal: Anxiety is at manageable level  Description  Interventions:  - Assess and monitor patient's anxiety level  - Monitor for signs and symptoms (heart palpitations, chest pain, shortness of breath, headaches, nausea, feeling jumpy, restlessness, irritable, apprehensive)  - Collaborate with interdisciplinary team and initiate plan and interventions as ordered    - [place] patient to unit/surroundings  - Explain treatment plan  - Encourage participation in care  - Encourage verbalization of concerns/fears  - Identify coping mechanisms  - Assist in developing anxiety-reducing skills  - Administer/offer alternative therapies  - Limit or eliminate stimulants  Outcome: Progressing     Problem: DISCHARGE PLANNING  Goal: Discharge to home or other facility with appropriate resources  Description  INTERVENTIONS:  - Identify barriers to discharge w/patient and caregiver  - Arrange for needed discharge resources and transportation as appropriate  - Identify discharge learning needs (meds, wound care, etc )  - Arrange for interpretive services to assist at discharge as needed  - Refer to Case Management Department for coordinating discharge planning if the patient needs post-hospital services based on physician/advanced practitioner order or complex needs related to functional status, cognitive ability, or social support system  Outcome: Progressing

## 2019-08-26 NOTE — SOCIAL WORK
Worker spoke with patient regarding discharge planning  Patient reports that she had spoke with a peer about potentially residing with her upon discharge  Worker reported that it is her decision as to where she would like to reside but it is not advised by staff  Patient verbalized understanding  Worker did inform patient that time will be taken out tomorrow to contact the shelters that were provided by [facility], patient is in agreement  Patient reports that if there are openings at a shelter she will be happy to go there  Worker informed patient that if she resides with a friend outpatient mental health referrals will be made for her  Patient is much more bright and focused on her continuation of treatment on an outpatient basis

## 2019-08-26 NOTE — PROGRESS NOTES
08/26/19 0915   Team Meeting   Meeting Type Daily Rounds   Initial Conference Date 08/26/19   Team Members Present   Team Members Present Physician;Nurse;   Physician Team Member Dr Anel Fry Team Member [address],[facility] Management Team Member Huey Nava   Patient/Family Present   Patient Present No   Patient's Family Present No   Chart and labs were reviewed  Medications to be adjusted  Patient continues report suicidal ideations at times  She has been visible and social with her peers  Attending group therapy  Discharge date pending for this week

## 2019-08-26 NOTE — PROGRESS NOTES
08/26/19 1000   Activity/Group Checklist   Group   (recovery group-gratitude)   Attendance Attended   Attendance Duration (min) 46-60   Interactions Interacted appropriately   Affect/Mood Appropriate   Goals Achieved Identified feelings; Identified triggers; Discussed self-esteem issues; Able to listen to others; Increased hopefulness; Able to engage in interactions; Able to reflect/comment on own behavior;Able to self-disclose; Able to recieve feedback; Able to give feedback to another

## 2019-08-26 NOTE — PLAN OF CARE
Problem: Risk for Self Injury/Neglect  Goal: Verbalize thoughts and feelings  Description  Interventions:  - Assess and re-assess patient's lethality and potential for self-injury  - Engage patient in 1:1 interactions, daily, for a minimum of 15 minutes  - Encourage patient to express feelings, fears, frustrations, hopes  - Establish rapport/trust with patient   8/25/2019 2252 by Gail Ochoa RN  Outcome: Progressing  8/25/2019 1534 by Gail Ochoa RN  Outcome: Progressing  Goal: Refrain from harming self  Description  Interventions:  - Monitor patient closely, per order  - Develop a trusting relationship  - Supervise medication ingestion, monitor effects and side effects   8/25/2019 2252 by Gail Ochoa RN  Outcome: Progressing  8/25/2019 1534 by Gail Ochoa RN  Outcome: Progressing     Problem: Depression  Goal: Refrain from harming self  Description  Interventions:  - Monitor patient closely, per order   - Supervise medication ingestion, monitor effects and side effects   Outcome: Progressing  Goal: Refrain from isolation  Description  Interventions:  - Develop a trusting relationship   - Encourage socialization   Outcome: Progressing     Problem: Anxiety  Goal: Anxiety is at manageable level  Description  Interventions:  - Assess and monitor patient's anxiety level  - Monitor for signs and symptoms (heart palpitations, chest pain, shortness of breath, headaches, nausea, feeling jumpy, restlessness, irritable, apprehensive)  - Collaborate with interdisciplinary team and initiate plan and interventions as ordered  - [place] patient to unit/surroundings  - Explain treatment plan  - Encourage participation in care  - Encourage verbalization of concerns/fears  - Identify coping mechanisms  - Assist in developing anxiety-reducing skills  - Administer/offer alternative therapies  - Limit or eliminate stimulants  Outcome: Progressing   Patient visible on unit   Med and meal compliant, interacting with staff and peers  No behavioral issues  Will continue to monitor for changes in current status

## 2019-08-26 NOTE — PROGRESS NOTES
Progress Note - Behavioral Health     Sherry Newby 38 y o  female MRN: [id number]   Unit/Bed#: [facility] 379-01 Encounter: [id number]    Behavior over the last 24 hours: some improvement  [place] was seen and evaluated for continuing care and case was discussed with the treatment team  She reported that she is doing better and appeares brighter  She feels less anxious and depressed, continues to improve, denies suicidal thoughts, is more in control and motivated today  Has been taking medications  Follows directions appropriately  Participates appropriately in milieu  Socializes with peers  Sleep: early awakening  Appetite: normal  Medication side effects: No   ROS: no complaints    Mental Status Evaluation:    Appearance:  casually dressed   Behavior:  cooperative, calm   Speech:  normal rate and volume   Mood:  depressed, anxious   Affect:  reactive, slightly brighter   Thought Process:  goal directed, linear   Associations: intact associations   Thought Content:  no overt delusions   Perceptual Disturbances: no auditory hallucinations   Risk Potential: Suicidal ideation - None at present, would talk to staff if not feeling safe on the unit  Homicidal ideation - None  Potential for aggression - No   Sensorium:  oriented to person, place and time/date   Memory:  recent and remote memory grossly intact   Consciousness:  alert and awake   Attention: attention span and concentration are age appropriate   Insight:  improved   Judgment: improved   Gait/Station: normal gait/station   Motor Activity: no abnormal movements     Vital signs in last 24 hours:    Temp:  [97 °F (36 1 °C)-97 7 °F (36 5 °C)] 97 °F (36 1 °C)  HR:  [55-65] 65  Resp:  [16] 16  BP: ()/(63-67) 108/66    Laboratory results:  I have personally reviewed all pertinent laboratory/tests results      Progress Toward Goals: continues to improve    Assessment/Plan   Principal Problem:    Bipolar II disorder (HCC)  Active Problems:    HEAVEN (generalized anxiety disorder)    Recommended Treatment:     Planned medication and treatment changes: All current active medications have been reviewed  Encourage group therapy, milieu therapy and occupational therapy  Behavioral Health checks every 7 minutes  Discharge planning  Continue current medications:    Current Facility-Administered Medications:  acetaminophen 650 mg Oral Q6H PRN Ary Blake MD   aluminum-magnesium hydroxide-simethicone 30 mL Oral Q4H PRN Ary Blake MD   benztropine 1 mg Intramuscular Q6H PRN Ary Blake MD   benztropine 1 mg Oral Q6H PRN Ary Blake MD   DULoxetine 60 mg Oral Daily Michelle Abarca MD   gabapentin 600 mg Oral TID Alonza Riedel, MD   haloperidol 5 mg Oral Q6H PRN Ary Blake MD   haloperidol lactate 5 mg Intramuscular Q6H PRN Ary Blake MD   lithium carbonate 450 mg Oral Q12H [address] Michelle Abarca MD   LORazepam 2 mg Intramuscular Q6H PRN Michelle Abarca MD   LORazepam 1 mg Oral Q6H PRN Michelle Abarca MD   magnesium hydroxide 30 mL Oral Daily PRN Ary Blake MD   neomycin-bacitracin-polymyxin b 1 small application Topical BID PRN Michelle Abarca MD   nicotine 1 patch Transdermal Daily Ary Blake MD   nicotine polacrilex 2 mg Oral Q4H PRN LadySAMUEL Cortez   prazosin 1 mg Oral HS Michelle Abarca MD   traZODone 150 mg Oral HS Michelle Abarca MD   traZODone 50 mg Oral HS PRN Ary Blake MD       Risks / Benefits of Treatment:    Risks, benefits, and possible side effects of medications explained to patient and patient verbalizes understanding and agreement for treatment  Counseling / Coordination of Care:    Medication changes reviewed with staff in treatment team meeting  Medications, treatment progress and treatment plan reviewed with patient  Supportive therapy provided to patient  Crisis/safety plan discussed with patient  Discharge plan discussed with patient      Michelle Abarca MD 08/26/19

## 2019-08-27 PROCEDURE — 99232 SBSQ HOSP IP/OBS MODERATE 35: CPT | Performed by: PSYCHIATRY & NEUROLOGY

## 2019-08-27 RX ADMIN — TRAZODONE HYDROCHLORIDE 150 MG: 100 TABLET ORAL at 21:41

## 2019-08-27 RX ADMIN — DULOXETINE HYDROCHLORIDE 60 MG: 60 CAPSULE, DELAYED RELEASE ORAL at 08:58

## 2019-08-27 RX ADMIN — LITHIUM CARBONATE 450 MG: 450 TABLET, EXTENDED RELEASE ORAL at 21:41

## 2019-08-27 RX ADMIN — GABAPENTIN 600 MG: 300 CAPSULE ORAL at 21:41

## 2019-08-27 RX ADMIN — PRAZOSIN HYDROCHLORIDE 1 MG: 1 CAPSULE ORAL at 21:42

## 2019-08-27 RX ADMIN — GABAPENTIN 600 MG: 300 CAPSULE ORAL at 08:58

## 2019-08-27 RX ADMIN — LITHIUM CARBONATE 450 MG: 450 TABLET, EXTENDED RELEASE ORAL at 08:58

## 2019-08-27 RX ADMIN — GABAPENTIN 600 MG: 300 CAPSULE ORAL at 16:35

## 2019-08-27 NOTE — PROGRESS NOTES
Pt has been pleasant and cooperative, visible/social  Pt is med/meal compliant  Pt reports a 3/4 anxiety and 2/10 depression but denies any SI, HI, AH, or VH  Will continue to monitor

## 2019-08-27 NOTE — PROGRESS NOTES
08/27/19 0805   Team Meeting   Meeting Type Daily Rounds   Initial Conference Date 08/27/19   Team Members Present   Team Members Present Physician;Nurse;   Physician Team Member Dr Tonie Cornelius Team Member [address] [facility] [address],[facility] Management Team Member Edgard Jenkins   Patient/Family Present   Patient Present No   Patient's Family Present No   Chart and labs were reviewed  Medications to be monitored  Patient pending discharge for Thursday

## 2019-08-27 NOTE — NURSING NOTE
Patient is resting in bed comfortably with eyes closed  No s/s of acute respiratory distress observed  Will continue to monitor patient

## 2019-08-27 NOTE — PLAN OF CARE
Problem: Risk for Self Injury/Neglect  Goal: Treatment Goal: Remain safe during length of stay, learn and adopt new coping skills, and be free of self-injurious ideation, impulses and acts at the time of discharge  Outcome: Progressing  Goal: Verbalize thoughts and feelings  Description  Interventions:  - Assess and re-assess patient's lethality and potential for self-injury  - Engage patient in 1:1 interactions, daily, for a minimum of 15 minutes  - Encourage patient to express feelings, fears, frustrations, hopes  - Establish rapport/trust with patient   Outcome: Progressing  Goal: Refrain from harming self  Description  Interventions:  - Monitor patient closely, per order  - Develop a trusting relationship  - Supervise medication ingestion, monitor effects and side effects   Outcome: Progressing  Goal: Attend and participate in unit activities, including therapeutic, recreational, and educational groups  Description  Interventions:  - Provide therapeutic and educational activities daily, encourage attendance and participation, and document same in the medical record  - Obtain collateral information, encourage visitation and family involvement in care   Outcome: Progressing  Goal: Recognize maladaptive responses and adopt new coping mechanisms  Outcome: Progressing  Goal: Complete daily ADLs, including personal hygiene independently, as able  Description  Interventions:  - Observe, teach, and assist patient with ADLS  - Monitor and promote a balance of rest/activity, with adequate nutrition and elimination  Outcome: Progressing     Problem: Depression  Goal: Treatment Goal: Demonstrate behavioral control of depressive symptoms, verbalize feelings of improved mood/affect, and adopt new coping skills prior to discharge  Outcome: Progressing  Goal: Verbalize thoughts and feelings  Description  Interventions:  - Assess and re-assess patient's level of risk   - Engage patient in 1:1 interactions, daily, for a minimum of 15 minutes   - Encourage patient to express feelings, fears, frustrations, hopes   Outcome: Progressing  Goal: Refrain from harming self  Description  Interventions:  - Monitor patient closely, per order   - Supervise medication ingestion, monitor effects and side effects   Outcome: Progressing  Goal: Refrain from isolation  Description  Interventions:  - Develop a trusting relationship   - Encourage socialization   Outcome: Progressing  Goal: Refrain from self-neglect  Outcome: Progressing  Goal: Attend and participate in unit activities, including therapeutic, recreational, and educational groups  Description  Interventions:  - Provide therapeutic and educational activities daily, encourage attendance and participation, and document same in the medical record   Outcome: Progressing  Goal: Complete daily ADLs, including personal hygiene independently, as able  Description  Interventions:  - Observe, teach, and assist patient with ADLS  -  Monitor and promote a balance of rest/activity, with adequate nutrition and elimination   Outcome: Progressing     Problem: Anxiety  Goal: Anxiety is at manageable level  Description  Interventions:  - Assess and monitor patient's anxiety level  - Monitor for signs and symptoms (heart palpitations, chest pain, shortness of breath, headaches, nausea, feeling jumpy, restlessness, irritable, apprehensive)  - Collaborate with interdisciplinary team and initiate plan and interventions as ordered    - [place] patient to unit/surroundings  - Explain treatment plan  - Encourage participation in care  - Encourage verbalization of concerns/fears  - Identify coping mechanisms  - Assist in developing anxiety-reducing skills  - Administer/offer alternative therapies  - Limit or eliminate stimulants  Outcome: Progressing     Problem: DISCHARGE PLANNING  Goal: Discharge to home or other facility with appropriate resources  Description  INTERVENTIONS:  - Identify barriers to discharge w/patient and caregiver  - Arrange for needed discharge resources and transportation as appropriate  - Identify discharge learning needs (meds, wound care, etc )  - Arrange for interpretive services to assist at discharge as needed  - Refer to Case Management Department for coordinating discharge planning if the patient needs post-hospital services based on physician/advanced practitioner order or complex needs related to functional status, cognitive ability, or social support system  Outcome: Progressing

## 2019-08-27 NOTE — PROGRESS NOTES
08/27/19 1115   Activity/Group Checklist   Group   (recovery group)   Attendance Attended   Attendance Duration (min) 46-60   Affect/Mood Appropriate   Goals Achieved Able to listen to others; Able to engage in interactions; Discussed self-esteem issues; Able to reflect/comment on own behavior;Able to self-disclose

## 2019-08-27 NOTE — NURSING NOTE
Patient has been compliant with medication and shabazz routine She attends groups regularly and she is social with all her peers   Patient is to be discharged soon
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
## 2019-08-27 NOTE — SOCIAL WORK
Worker sat with patient to contact [facility] and [employer] for shelter openings  [facility] reported having no openings and provided patient with other resources in various counties  [facility] with additional [facility] resources  Worker informed patient to think about the various resources and if she wanted to relocate to another county  Patient still plans on residing with a peer temporarily upon discharge

## 2019-08-28 PROCEDURE — 99232 SBSQ HOSP IP/OBS MODERATE 35: CPT | Performed by: PSYCHIATRY & NEUROLOGY

## 2019-08-28 RX ADMIN — GABAPENTIN 600 MG: 300 CAPSULE ORAL at 17:17

## 2019-08-28 RX ADMIN — GABAPENTIN 600 MG: 300 CAPSULE ORAL at 09:09

## 2019-08-28 RX ADMIN — LITHIUM CARBONATE 450 MG: 450 TABLET, EXTENDED RELEASE ORAL at 09:09

## 2019-08-28 RX ADMIN — TRAZODONE HYDROCHLORIDE 150 MG: 100 TABLET ORAL at 21:22

## 2019-08-28 RX ADMIN — ACETAMINOPHEN 650 MG: 325 TABLET ORAL at 17:17

## 2019-08-28 RX ADMIN — DULOXETINE HYDROCHLORIDE 60 MG: 60 CAPSULE, DELAYED RELEASE ORAL at 09:09

## 2019-08-28 RX ADMIN — GABAPENTIN 600 MG: 300 CAPSULE ORAL at 21:22

## 2019-08-28 RX ADMIN — PRAZOSIN HYDROCHLORIDE 1 MG: 1 CAPSULE ORAL at 21:22

## 2019-08-28 RX ADMIN — LITHIUM CARBONATE 450 MG: 450 TABLET, EXTENDED RELEASE ORAL at 21:22
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
## 2019-08-28 NOTE — PROGRESS NOTES
08/28/19 0827   Team Meeting   Meeting Type Daily Rounds   Initial Conference Date 08/28/19   Team Members Present   Team Members Present Physician;Nurse;   Physician Team Member Dr Levi López Team Member [facility] Management Team Member Rakel Benavides   Patient/Family Present   Patient Present No   Patient's Family Present No   L     08/28/19 0827   Team Meeting   Meeting Type Daily Rounds   Initial Conference Date 08/28/19   Team Members Present   Team Members Present Physician;Nurse;   Physician Team Member Dr Levi López Team Member [facility] Management Team Member Rakel Benavides   Patient/Family Present   Patient Present No   Patient's Family Present No   Labs and chart reviewed  Pt is visible and social  Pt reports depression  Discharged pending 8/29  Meds to be monitored

## 2019-08-28 NOTE — PROGRESS NOTES
08/28/19 1300   Activity/Group Checklist   Group   (recovery group)   Attendance Attended   Attendance Duration (min) 46-60   Interactions Interacted appropriately   Affect/Mood Appropriate   Goals Achieved Able to engage in interactions; Able to reflect/comment on own behavior;Able to self-disclose; Able to give feedback to another

## 2019-08-28 NOTE — PROGRESS NOTES
Pt visible and socializing appropriately among peers and staff  Pt pleasant and cooperative with care  Pt reports some anxiety and depression  Pt currently denies SI/HI/VH  Pt Temp 100 8 at 1657  No other s/s present upon assessment  PRN Tylenol Po given  Recheck at [phone]  Pt compliant with meal and medication regime at this time  VSS

## 2019-08-28 NOTE — PROGRESS NOTES
Progress Note - Behavioral Health     Amish Allen 38 y o  female MRN: [id number]   Unit/Bed#: [facility] 379-01 Encounter: [id number]    Behavior over the last 24 hours: significantly improved  [place] was seen and evaluated for continuing care and case was discussed with the treatment team  She reported that she is doing much better and is optimistic and future oriented  She appears more calm, cooperative, in control and motivated, continues to improve, feels better and hopeful, denies negative thoughts and suicidal thoughts today  Has been taking medications  Follows directions more appropriately  Participates appropriately in group therapy  Socializes with peers  Sleep: early awakening  Appetite: normal  Medication side effects: No   ROS: no complaints    Mental Status Evaluation:    Appearance:  casually dressed   Behavior:  pleasant, calm   Speech:  normal rate and volume   Mood:  normal, improved   Affect:  brighter   Thought Process:  goal directed, linear   Associations: intact associations   Thought Content:  no overt delusions   Perceptual Disturbances: none   Risk Potential: Suicidal ideation - None  Homicidal ideation - None  Potential for aggression - No   Sensorium:  oriented to person, place and time/date   Memory:  recent and remote memory grossly intact   Consciousness:  alert and awake   Attention: attention span and concentration are age appropriate   Insight:  good and improved   Judgment: good and improved   Gait/Station: normal gait/station   Motor Activity: no abnormal movements     Vital signs in last 24 hours:    Temp:  [98 4 °F (36 9 °C)] 98 4 °F (36 9 °C)  HR:  [56-62] 61  Resp:  [18] 18  BP: (113-124)/(69-81) 113/69    Laboratory results:  I have personally reviewed all pertinent laboratory/tests results      Progress Toward Goals: significant improvement    Assessment/Plan   Principal Problem:    Bipolar II disorder (HCC)  Active Problems:    HEAVEN (generalized anxiety disorder)    Recommended Treatment:     Planned medication and treatment changes: All current active medications have been reviewed  Encourage group therapy, milieu therapy and occupational therapy  Behavioral Health checks every 7 minutes  Discharge planning  Continue current medications:    Current Facility-Administered Medications:  acetaminophen 650 mg Oral Q6H PRN Keshia Cruz MD   aluminum-magnesium hydroxide-simethicone 30 mL Oral Q4H PRN Keshia Cruz MD   benztropine 1 mg Intramuscular Q6H PRN Keshia Cruz MD   benztropine 1 mg Oral Q6H PRN Keshia Cruz MD   DULoxetine 60 mg Oral Daily Michelet Beaulieu MD   gabapentin 600 mg Oral TID Nereyda Velazquez MD   haloperidol 5 mg Oral Q6H PRN Keshia Cruz MD   haloperidol lactate 5 mg Intramuscular Q6H PRN Keshia Cruz MD   lithium carbonate 450 mg Oral Q12H [facility] Michelet Beaulieu MD   LORazepam 2 mg Intramuscular Q6H PRN Michelet Beaulieu MD   LORazepam 1 mg Oral Q6H PRN Michelet Beaulieu MD   magnesium hydroxide 30 mL Oral Daily PRN Keshia Cruz MD   neomycin-bacitracin-polymyxin b 1 small application Topical BID PRN Michelet Beaulieu MD   nicotine 1 patch Transdermal Daily Keshia Cruz MD   nicotine polacrilex 2 mg Oral Q4H PRN SAMUEL Apodaca   prazosin 1 mg Oral HS Michelet Beaulieu MD   traZODone 150 mg Oral HS Michelet Beaulieu MD   traZODone 50 mg Oral HS PRN Keshia Cruz MD       Risks / Benefits of Treatment:    Risks, benefits, and possible side effects of medications explained to patient and patient verbalizes understanding and agreement for treatment  Counseling / Coordination of Care:    Medication changes reviewed with staff in treatment team meeting  Medications, treatment progress and treatment plan reviewed with patient  Reassurance and supportive therapy provided  Crisis/safety plan discussed with patient  Discharge plan discussed with patient      Michelet Beaulieu MD 08/28/19

## 2019-08-28 NOTE — PLAN OF CARE
Problem: Risk for Self Injury/Neglect  Goal: Treatment Goal: Remain safe during length of stay, learn and adopt new coping skills, and be free of self-injurious ideation, impulses and acts at the time of discharge  Outcome: Progressing  Goal: Verbalize thoughts and feelings  Description  Interventions:  - Assess and re-assess patient's lethality and potential for self-injury  - Engage patient in 1:1 interactions, daily, for a minimum of 15 minutes  - Encourage patient to express feelings, fears, frustrations, hopes  - Establish rapport/trust with patient   Outcome: Progressing  Goal: Refrain from harming self  Description  Interventions:  - Monitor patient closely, per order  - Develop a trusting relationship  - Supervise medication ingestion, monitor effects and side effects   Outcome: Progressing  Goal: Recognize maladaptive responses and adopt new coping mechanisms  Outcome: Progressing  Goal: Complete daily ADLs, including personal hygiene independently, as able  Description  Interventions:  - Observe, teach, and assist patient with ADLS  - Monitor and promote a balance of rest/activity, with adequate nutrition and elimination  Outcome: Progressing     Problem: Depression  Goal: Treatment Goal: Demonstrate behavioral control of depressive symptoms, verbalize feelings of improved mood/affect, and adopt new coping skills prior to discharge  Outcome: Progressing  Goal: Verbalize thoughts and feelings  Description  Interventions:  - Assess and re-assess patient's level of risk   - Engage patient in 1:1 interactions, daily, for a minimum of 15 minutes   - Encourage patient to express feelings, fears, frustrations, hopes   Outcome: Progressing  Goal: Refrain from harming self  Description  Interventions:  - Monitor patient closely, per order   - Supervise medication ingestion, monitor effects and side effects   Outcome: Progressing  Goal: Refrain from isolation  Description  Interventions:  - Develop a trusting relationship   - Encourage socialization   Outcome: Progressing  Goal: Refrain from self-neglect  Outcome: Progressing  Goal: Complete daily ADLs, including personal hygiene independently, as able  Description  Interventions:  - Observe, teach, and assist patient with ADLS  -  Monitor and promote a balance of rest/activity, with adequate nutrition and elimination   Outcome: Progressing     Problem: Anxiety  Goal: Anxiety is at manageable level  Description  Interventions:  - Assess and monitor patient's anxiety level  - Monitor for signs and symptoms (heart palpitations, chest pain, shortness of breath, headaches, nausea, feeling jumpy, restlessness, irritable, apprehensive)  - Collaborate with interdisciplinary team and initiate plan and interventions as ordered    - [place] patient to unit/surroundings  - Explain treatment plan  - Encourage participation in care  - Encourage verbalization of concerns/fears  - Identify coping mechanisms  - Assist in developing anxiety-reducing skills  - Administer/offer alternative therapies  - Limit or eliminate stimulants  Outcome: Progressing

## 2019-08-28 NOTE — SOCIAL WORK
Worker spoke with patient regarding discharge planning  Patient reports she plans on residing with a peer upon discharge  Patient reports that it will be a temporary situation as she wants to get her life back on track  Worker informed her that an outpatient psychiatric appointment will be made for her, she was in agreement  Patient signed SAMANTHA for [facility] as they accept patient's insurance        Worker contacted [facility] to schedule intake for 9/3 at 12:30pm

## 2019-08-28 NOTE — PROGRESS NOTES
08/27/19 1415   Activity/Group Checklist   Group Other (Comment)  (Art Therapy Process Group/Visual Introduction, Discussion)   Attendance Attended   Attendance Duration (min) Greater than 60   Interactions Interacted appropriately   Affect/Mood Appropriate   Goals Achieved Able to listen to others; Able to engage in interactions; Able to recieve feedback; Able to give feedback to another  (Able to engage art materials; full participation)

## 2019-08-28 NOTE — PROGRESS NOTES
[facility] GROUP NOTE     08/28/19 1130   Activity/Group Checklist   Group Personal control   Attendance Attended   Attendance Duration (min) 31-45   Interactions Interacted appropriately   Affect/Mood Appropriate   Goals Achieved Identified feelings; Able to listen to others; Able to engage in interactions; Able to reflect/comment on own behavior;Able to manage/cope with feelings; Able to self-disclose; Able to recieve feedback; Other (Comment); Displayed empathy  (Emerging tone of acceptance during group sharing )

## 2019-08-28 NOTE — PROGRESS NOTES
Pt visible and social with staff and peers  States, "I'm feeling so much better today than I did yesterday " Bright upon approach  Denies AH,VH,HI,SI  5/10 depression and 2/4 anxiety  Will monitor

## 2019-08-28 NOTE — PROGRESS NOTES
08/28/19 1000   Activity/Group Checklist   Group Other (Comment)  (Surviving Trauma Group/Education, Open Discussion)   Attendance Attended   Attendance Duration (min) Greater than 60   Interactions Interacted appropriately   Affect/Mood Appropriate   Goals Achieved Discussed coping strategies; Identified distorted thoughts/beliefs; Able to listen to others; Able to engage in interactions; Able to recieve feedback; Able to give feedback to another

## 2019-08-28 NOTE — PROGRESS NOTES
Patient slept through the night and did not awaken to report any problems or distress, or request any PRN's  Patient slept almost eight hours for almost entire shift with no issues, and was observed sleeping, with eyes closed, chest movements noted, and breathing heard  Q7 minute rounding continued for patient safety  Will continue to monitor

## 2019-08-29 VITALS
HEART RATE: 62 BPM | WEIGHT: 191.5 LBS | BODY MASS INDEX: 35.24 KG/M2 | OXYGEN SATURATION: 96 % | DIASTOLIC BLOOD PRESSURE: 74 MMHG | RESPIRATION RATE: 16 BRPM | HEIGHT: 62 IN | TEMPERATURE: 97 F | SYSTOLIC BLOOD PRESSURE: 119 MMHG

## 2019-08-29 PROBLEM — F17.200 SMOKING ADDICTION: Status: ACTIVE | Noted: 2019-08-29

## 2019-08-29 PROBLEM — F51.5 NIGHTMARES: Status: ACTIVE | Noted: 2019-08-29

## 2019-08-29 PROCEDURE — 99239 HOSP IP/OBS DSCHRG MGMT >30: CPT | Performed by: PSYCHIATRY & NEUROLOGY

## 2019-08-29 RX ORDER — NICOTINE 21 MG/24HR
1 PATCH, TRANSDERMAL 24 HOURS TRANSDERMAL DAILY
Qty: 28 PATCH | Refills: 0 | Status: ON HOLD | OUTPATIENT
Start: 2019-08-30 | End: 2019-09-13 | Stop reason: SDUPTHER

## 2019-08-29 RX ORDER — TRAZODONE HYDROCHLORIDE 50 MG/1
50 TABLET ORAL
Qty: 30 TABLET | Refills: 0 | Status: SHIPPED | OUTPATIENT
Start: 2019-08-29 | End: 2019-09-13 | Stop reason: HOSPADM

## 2019-08-29 RX ORDER — LITHIUM CARBONATE 450 MG
450 TABLET, EXTENDED RELEASE ORAL EVERY 12 HOURS SCHEDULED
Qty: 60 TABLET | Refills: 0 | Status: ON HOLD | OUTPATIENT
Start: 2019-08-29 | End: 2019-09-13 | Stop reason: SDUPTHER

## 2019-08-29 RX ORDER — GABAPENTIN 300 MG/1
600 CAPSULE ORAL 3 TIMES DAILY
Qty: 180 CAPSULE | Refills: 0 | Status: ON HOLD | OUTPATIENT
Start: 2019-08-29 | End: 2019-09-13 | Stop reason: SDUPTHER

## 2019-08-29 RX ORDER — PRAZOSIN HYDROCHLORIDE 1 MG/1
1 CAPSULE ORAL
Qty: 30 CAPSULE | Refills: 0 | Status: SHIPPED | OUTPATIENT
Start: 2019-08-29 | End: 2019-09-13 | Stop reason: HOSPADM

## 2019-08-29 RX ORDER — DULOXETIN HYDROCHLORIDE 60 MG/1
60 CAPSULE, DELAYED RELEASE ORAL DAILY
Qty: 30 CAPSULE | Refills: 0 | Status: ON HOLD | OUTPATIENT
Start: 2019-08-29 | End: 2019-09-13 | Stop reason: SDUPTHER

## 2019-08-29 RX ADMIN — DULOXETINE HYDROCHLORIDE 60 MG: 60 CAPSULE, DELAYED RELEASE ORAL at 08:15

## 2019-08-29 RX ADMIN — LITHIUM CARBONATE 450 MG: 450 TABLET, EXTENDED RELEASE ORAL at 08:15

## 2019-08-29 RX ADMIN — GABAPENTIN 600 MG: 300 CAPSULE ORAL at 08:15

## 2019-08-29 NOTE — PROGRESS NOTES
Patient pleasant upon approach  Patient denies anxiety, depression, S/HI,A/VH and pain  Med and meal compliant  Visible and social  Pending discharge this afternoon  Will continue to monitor and provide therapeutic support

## 2019-08-29 NOTE — SOCIAL WORK
Patient is to be discharged today, escorted to a friend's home at [address] by transport  Patient has an intake at [facility] on 9/3 at 12:30pm   Patient left with her scripts
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
## 2019-08-29 NOTE — NURSING NOTE
This author and patient reviewed discharge instructions and pt holly no questions at this time  Denies psych symptoms at time pf discharge  Denies S/HI  Patient is being discharged to home and will be traveling by private vehicle  Patient has all paperwork, scripts for medication and belongings at time of discharge   Escorted off the unit
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
## 2019-08-29 NOTE — PROGRESS NOTES
08/28/19 1415   Activity/Group Checklist   Group Other (Comment)  (Art Therapy Process Group/Exploring Barriers, Discussion)   Attendance Attended   Attendance Duration (min) Greater than 60   Interactions Interacted appropriately   Affect/Mood Appropriate   Goals Achieved Able to listen to others; Able to engage in interactions; Able to recieve feedback; Able to give feedback to another
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
## 2019-09-03 ENCOUNTER — HOSPITAL ENCOUNTER (EMERGENCY)
Facility: HOSPITAL | Age: 44
Discharge: HOME | End: 2019-09-03
Attending: EMERGENCY MEDICINE | Admitting: EMERGENCY MEDICINE
Payer: COMMERCIAL

## 2019-09-03 ENCOUNTER — HOSPITAL ENCOUNTER (EMERGENCY)
Facility: HOSPITAL | Age: 44
End: 2019-09-04
Attending: EMERGENCY MEDICINE | Admitting: EMERGENCY MEDICINE
Payer: COMMERCIAL

## 2019-09-03 VITALS
DIASTOLIC BLOOD PRESSURE: 78 MMHG | HEIGHT: 62 IN | OXYGEN SATURATION: 98 % | BODY MASS INDEX: 35.25 KG/M2 | TEMPERATURE: 99 F | RESPIRATION RATE: 16 BRPM | SYSTOLIC BLOOD PRESSURE: 134 MMHG | HEART RATE: 83 BPM | WEIGHT: 191.58 LBS

## 2019-09-03 DIAGNOSIS — F32.A DEPRESSION: Primary | ICD-10-CM

## 2019-09-03 DIAGNOSIS — F10.10 ALCOHOL ABUSE: ICD-10-CM

## 2019-09-03 DIAGNOSIS — R45.851 SUICIDAL IDEATIONS: Primary | ICD-10-CM

## 2019-09-03 LAB
ALBUMIN SERPL BCP-MCNC: 4.5 G/DL (ref 3–5.2)
ALP SERPL-CCNC: 93 U/L (ref 43–122)
ALT SERPL W P-5'-P-CCNC: 45 U/L (ref 9–52)
AMPHETAMINES SERPL QL SCN: NEGATIVE
AMPHETAMINES SERPL QL SCN: NEGATIVE
ANION GAP SERPL CALCULATED.3IONS-SCNC: 14 MMOL/L (ref 5–14)
AST SERPL W P-5'-P-CCNC: 35 U/L (ref 14–36)
BARBITURATES UR QL: NEGATIVE
BARBITURATES UR QL: NEGATIVE
BASOPHILS # BLD AUTO: 0.29 THOUSAND/UL (ref 0–0.1)
BASOPHILS NFR MAR MANUAL: 2 % (ref 0–1)
BENZODIAZ UR QL: NEGATIVE
BENZODIAZ UR QL: NEGATIVE
BILIRUB SERPL-MCNC: <0.1 MG/DL
BILIRUB UR QL STRIP: NEGATIVE
BUN SERPL-MCNC: <2 MG/DL (ref 5–25)
CALCIUM SERPL-MCNC: 9 MG/DL (ref 8.4–10.2)
CHLORIDE SERPL-SCNC: 108 MMOL/L (ref 97–108)
CLARITY UR: CLEAR
CO2 SERPL-SCNC: 23 MMOL/L (ref 22–30)
COCAINE UR QL: NEGATIVE
COCAINE UR QL: NEGATIVE
COLOR UR: NORMAL
CREAT SERPL-MCNC: 0.49 MG/DL (ref 0.6–1.2)
EOSINOPHIL # BLD AUTO: 0.44 THOUSAND/UL (ref 0–0.4)
EOSINOPHIL NFR BLD MANUAL: 3 % (ref 0–6)
ERYTHROCYTE [DISTWIDTH] IN BLOOD BY AUTOMATED COUNT: 15.6 %
ETHANOL EXG-MCNC: 0.08 MG/DL
ETHANOL EXG-MCNC: 0.12 MG/DL
ETHANOL EXG-MCNC: NORMAL MG/DL
ETHANOL SERPL-MCNC: 248 MG/DL (ref 0–10)
EXT PREG TEST URINE: NEGATIVE
EXT. CONTROL ED NAV: NORMAL
GFR SERPL CREATININE-BSD FRML MDRD: 120 ML/MIN/1.73SQ M
GLUCOSE SERPL-MCNC: 138 MG/DL (ref 70–99)
GLUCOSE UR STRIP-MCNC: NEGATIVE MG/DL
HCT VFR BLD AUTO: 44.5 % (ref 36–46)
HGB BLD-MCNC: 15 G/DL (ref 12–16)
HGB UR QL STRIP.AUTO: NEGATIVE
KETONES UR STRIP-MCNC: NEGATIVE MG/DL
LEUKOCYTE ESTERASE UR QL STRIP: NEGATIVE
LITHIUM SERPL-SCNC: <0.2 MMOL/L (ref 0.6–1.2)
LYMPHOCYTES # BLD AUTO: 39 % (ref 25–45)
LYMPHOCYTES # BLD AUTO: 5.73 THOUSAND/UL (ref 0.5–4)
MCH RBC QN AUTO: 31.4 PG (ref 26–34)
MCHC RBC AUTO-ENTMCNC: 33.7 G/DL (ref 31–36)
MCV RBC AUTO: 93 FL (ref 80–100)
METHADONE UR QL: NEGATIVE
METHADONE UR QL: NEGATIVE
MONOCYTES # BLD AUTO: 0.59 THOUSAND/UL (ref 0.2–0.9)
MONOCYTES NFR BLD AUTO: 4 % (ref 1–10)
NEUTS SEG # BLD: 7.64 THOUSAND/UL (ref 1.8–7.8)
NEUTS SEG NFR BLD AUTO: 52 %
NITRITE UR QL STRIP: NEGATIVE
OPIATES UR QL SCN: NEGATIVE
OPIATES UR QL SCN: NEGATIVE
PCP UR QL: NEGATIVE
PCP UR QL: NEGATIVE
PH UR STRIP.AUTO: 5 [PH]
PLATELET # BLD AUTO: 729 THOUSANDS/UL (ref 150–450)
PLATELET BLD QL SMEAR: ABNORMAL
PMV BLD AUTO: 6.8 FL (ref 8.9–12.7)
POTASSIUM SERPL-SCNC: 4.1 MMOL/L (ref 3.6–5)
PROT SERPL-MCNC: 8 G/DL (ref 5.9–8.4)
PROT UR STRIP-MCNC: NEGATIVE MG/DL
RBC # BLD AUTO: 4.79 MILLION/UL (ref 4–5.2)
RBC MORPH BLD: NORMAL
SODIUM SERPL-SCNC: 145 MMOL/L (ref 137–147)
SP GR UR STRIP.AUTO: 1.01 (ref 1–1.04)
THC UR QL: POSITIVE
THC UR QL: POSITIVE
TOTAL CELLS COUNTED SPEC: 100
UROBILINOGEN UA: NEGATIVE MG/DL
WBC # BLD AUTO: 14.7 THOUSAND/UL (ref 4.5–11)

## 2019-09-03 PROCEDURE — 85027 COMPLETE CBC AUTOMATED: CPT | Performed by: EMERGENCY MEDICINE

## 2019-09-03 PROCEDURE — 80307 DRUG TEST PRSMV CHEM ANLYZR: CPT | Performed by: EMERGENCY MEDICINE

## 2019-09-03 PROCEDURE — 99284 EMERGENCY DEPT VISIT MOD MDM: CPT

## 2019-09-03 PROCEDURE — 85007 BL SMEAR W/DIFF WBC COUNT: CPT | Performed by: EMERGENCY MEDICINE

## 2019-09-03 PROCEDURE — 82075 ASSAY OF BREATH ETHANOL: CPT | Performed by: EMERGENCY MEDICINE

## 2019-09-03 PROCEDURE — 99285 EMERGENCY DEPT VISIT HI MDM: CPT

## 2019-09-03 PROCEDURE — 80178 ASSAY OF LITHIUM: CPT | Performed by: EMERGENCY MEDICINE

## 2019-09-03 PROCEDURE — 80053 COMPREHEN METABOLIC PANEL: CPT | Performed by: EMERGENCY MEDICINE

## 2019-09-03 PROCEDURE — 36415 COLL VENOUS BLD VENIPUNCTURE: CPT | Performed by: EMERGENCY MEDICINE

## 2019-09-03 PROCEDURE — 99283 EMERGENCY DEPT VISIT LOW MDM: CPT | Performed by: EMERGENCY MEDICINE

## 2019-09-03 PROCEDURE — 80320 DRUG SCREEN QUANTALCOHOLS: CPT | Performed by: EMERGENCY MEDICINE

## 2019-09-03 PROCEDURE — 81025 URINE PREGNANCY TEST: CPT | Performed by: EMERGENCY MEDICINE

## 2019-09-03 PROCEDURE — 99285 EMERGENCY DEPT VISIT HI MDM: CPT | Performed by: EMERGENCY MEDICINE

## 2019-09-03 RX ORDER — LORAZEPAM 0.5 MG/1
1 TABLET ORAL ONCE
Status: COMPLETED | OUTPATIENT
Start: 2019-09-03 | End: 2019-09-03

## 2019-09-03 RX ORDER — LORAZEPAM 0.5 MG/1
1 TABLET ORAL ONCE
Status: COMPLETED | OUTPATIENT
Start: 2019-09-04 | End: 2019-09-04

## 2019-09-03 RX ADMIN — LORAZEPAM 1 MG: 0.5 TABLET ORAL at 14:26

## 2019-09-03 NOTE — LETTER
[facility] EMERGENCY DEPARTMENT  [address] [facility] [address]  [phone]  Dept: [phone]      EMTALA TRANSFER CONSENT    NAME Lois Forte                                         1975                              MRN [id number]    I have been informed of my rights regarding examination, treatment, and transfer   by Nicolas Welch DO    Benefits: Continuity of care    Risks: Potential for delay in receiving treatment      { ED EMTALA TRANSFER CHOICES:[id number]}    I authorize the performance of emergency medical procedures and treatments upon me in both transit and upon arrival at the receiving facility  Additionally, I authorize the release of any and all medical records to the receiving facility and request they be transported with me, if possible  I understand that the safest mode of transportation during a medical emergency is an ambulance and that the Hospital advocates the use of this mode of transport  Risks of traveling to the receiving facility by car, including absence of medical control, life sustaining equipment, such as oxygen, and medical personnel has been explained to me and I fully understand them  (JASVIR CORRECT BOX BELOW)  [x  ]  I consent to the stated transfer and to be transported by ambulance/helicopter  [  ]  I consent to the stated transfer, but refuse transportation by ambulance and accept full responsibility for my transportation by car    I understand the risks of non-ambulance transfers and I exonerate the Hospital and its staff from any deterioration in my condition that results from this refusal     X___________________________________________    DATE  19  TIME_13:30_______  Signature of patient or legally responsible individual signing on patient behalf           RELATIONSHIP TO PATIENT___Self______________________          Provider Certification    NAME Lois Forte                                        DIAN 1975 MRN [id number]    A medical screening exam was performed on the above named patient  Based on the examination: The patient is medically cleared for transfer to inpatient psychiatry  Condition Necessitating Transfer The primary encounter diagnosis was Suicidal ideations  A diagnosis of Alcohol abuse was also pertinent to this visit  Patient Condition: The patient has been stabilized such that within reasonable medical probability, no material deterioration of the patient condition or the condition of the unborn child(mariana) is likely to result from the transfer    Reason for Transfer: No bed available at level of patient's needs    Transfer Requirements: [address]   · Space available and qualified personnel available for treatment as acknowledged by Oziel Charles  · Agreed to accept transfer and to provide appropriate medical treatment as acknowledged by       Marva Boyd PA-C  · Appropriate medical records of the examination and treatment of the patient are provided at the time of transfer   [address] [place] [address], [address] _cmk______  · Transfer will be performed by qualified personnel from [address] [place] [address] [phone]  and appropriate transfer equipment as required, including the use of necessary and appropriate life support measures      Provider Certification: I have examined the patient and explained the following risks and benefits of being transferred/refusing transfer to the patient/family:  General risk, such as traffic hazards, adverse weather conditions, rough terrain or turbulence, possible failure of equipment (including vehicle or aircraft), or consequences of actions of persons outside the control of the transport personnel      Based on these reasonable risks and benefits to the patient and/or the unborn child(mariana), and based upon the information available at the time of the patients examination, I certify that the medical benefits reasonably to be expected from the provision of appropriate medical treatments at another medical facility outweigh the increasing risks, if any, to the individuals medical condition, and in the case of labor to the unborn child, from effecting the transfer      X____________________________________________ DATE 09/04/19        TIME_13:30______      ORIGINAL - SEND TO MEDICAL RECORDS   COPY - SEND WITH PATIENT DURING TRANSFER

## 2019-09-03 NOTE — ED NOTES
Crisis worker escorting Patient to family room  to evaluate patient       Basilia Frederick RN  09/03/19 5737

## 2019-09-03 NOTE — LETTER
Section I - General Information    Name of Patient: Donna Lindsey                 : 1975    Medicare #:____________________  Transport Date: 19 (PCS is valid for round trips on this date and for all repetitive trips in the 60-day range as noted below )  Origin: [place] [place]                                                         Destination:_[facility], [place] [address]  Is the pt's stay covered under Medicare Part A (PPS/DRG)     (_) YES  (_x) NO  Closest appropriate facility? (x_) YES  (_) NO  If no, why is transport to more distant facility required?________________________  If hosp-hosp transfer, describe services needed at 2nd facility not available at 1st facility? _________________________________  If hospice pt, is this transport related to pt's terminal illness? (_) YES (_) NO Describe____________________________________    Section II - Medical Necessity Questionnaire  Ambulance transportation is medically necessary only if other means of transport are contraindicated or would be potentially harmful to the patient  To meet this requirement, the patient must either be "bed confined" or suffer from a condition such that transport by means other than ambulance is contraindicated by the patient's condition   The following questions must be answered by the medical professional signing below for this form to be valid:    1)  Describe the MEDICAL CONDITION (physical and/or mental) of this patient AT [address] [place] [address] that requires the patient to be transported in an ambulance and why transport by other means is contraindicated by the patient's condition:_Major Depressive Disorder; Suicidality; PTSD__  2) Is the patient "bed confined" as defined below?     (_) YES  (x_) NO  To be "be confined" the patient must satisfy all three of the following conditions: (1) unable to get up from bed without Assistance; AND (2) unable to ambulate; AND (3) unable to sit in a chair or wheelchair  3) Can this patient safely be transported by car or wheelchair Herndon President (i e , seated during transport without a medical attendant or monitoring)?   (_) YES  (x_) NO    4) In addition to completing questions 1-3 above, please check any of the following conditions that apply*:  *Note: supporting documentation for any boxes checked must be maintained in the patient's medical records  (_)Contractures   (_)Non-Healed Fractures  (_)Patient is confused (_)Patient is comatose (_)Moderate/severe pain on movement (x_)Danger to self/others  (_)IV meds/fluids required (_)Patient is combative(_)Need or possible need for restraints (_)DVT requires elevation of lower extremity  (_)Medical attendant required (_)Requires oxygen-unable to self administer (_)Special handling/isolation/infection control precautions required (_)Unable to tolerate seated position for time needed to transport (_)Hemodynamic monitoring required en route (_)Unable to sit in a chair or wheelchair due to decubitus ulcers or other wounds (_)Cardiac monitoring required en route (_)Morbid obesity requires additional personnel/equipment to safely handle patient (_)Orthopedic device (backboard, halo, pins, traction, brace, wedge, etc,) requiring special handling during transport (_)Other(specify)_______________________________________________    Section III - Signature of Physician or Healthcare Professional  I certify that the above information is true and correct based on my evaluation of this patient, and represent that the patient requires transport by ambulance and that other forms of transport are contraindicated  I understand that this information will be used by the Centers for Medicare and Medicaid Services (CMS) to support the determination of medical necessity for ambulance services, and I represent that I have personal knowledge of the patient's condition at time of transport    (_) If this box is checked, I also certify that the patient is physically or mentally incapable of signing the ambulance service's claim and that the institution with which I am affiliated has furnished care, services, or assistance to the patient  My signature below is made on behalf of the patient pursuant to 42 CFR §424 36(b)(4)  In accordance with 42 CFR §424 37, the specific reason(s) that the patient is physically or mentally incapable of signing the claim form is as follows: _________________________________________________________________________________________________________      Signature of Physician* or Healthcare Professional______________________________________________________________  Signature Date 09/04/19 (For scheduled repetitive transports, this form is not valid for transports performed more than 60 days after this date)    Printed Name & Credentials of Physician or Healthcare Professional (MD, DO, RN, etc )_Joe Phillips DO  *Form must be signed by patient's attending physician for scheduled, repetitive transports   For non-repetitive, unscheduled ambulance transports, if unable to obtain the signature of the attending physician, any of the following may sign (choose appropriate option below)  (_) Physician Assistant (_)  Clinical Nurse Specialist (_)  Registered Nurse  (_)  Nurse Practitioner  (_) Discharge Planner

## 2019-09-03 NOTE — ED PROVIDER NOTES
History  Chief Complaint   Patient presents with    Psychiatric Evaluation     pt has SI since yesterday  Does not wish to leave and is tearful  60-year-old female presents for psychiatric evaluation  She reports that she was admitted here recently and felt better upon discharge  Since returning home, she has been kicked out of the residence where she was supposed to live secondary to her excessive use of alcohol  She admits to drinking line on a regular basis to make her feel better  She denies any use of liquor, beer, or illicit drugs  She reports that she is having increasing symptoms of depression and anxiety since losing her place of residence  She states that if she could find a way that would be painless and not messy, she would kill herself now  She denies hallucinations, homicidal ideation, or any acute medical conditions  She reports that she has been taking her medications as prescribed  Psychiatric Evaluation   Presenting symptoms: agitation, depression and suicidal thoughts    Degree of incapacity (severity):  Severe  Onset quality:  Gradual  Timing:  Constant  Progression:  Worsening  Chronicity:  Recurrent  Context: alcohol use    Context: not drug abuse and not noncompliant    Treatment compliance: All of the time  Relieved by:  Nothing  Worsened by:  Alcohol  Ineffective treatments:  None tried  Associated symptoms: anxiety, appetite change, fatigue, feelings of worthlessness and insomnia    Associated symptoms: no abdominal pain, no chest pain and no headaches        Prior to Admission Medications   Prescriptions Last Dose Informant Patient Reported? Taking?    DULoxetine (CYMBALTA) 60 mg delayed release capsule   No No   Sig: Take 1 capsule (60 mg total) by mouth daily   gabapentin (NEURONTIN) 300 mg capsule   No No   Sig: Take 2 capsules (600 mg total) by mouth 3 (three) times a day   lithium carbonate (LITHOBID) 450 mg CR tablet   No No   Sig: Take 1 tablet (450 mg total) by mouth every 12 (twelve) hours   nicotine (NICODERM CQ) 21 mg/24 hr TD 24 hr patch   No No   Sig: Place 1 patch on the skin daily   prazosin (MINIPRESS) 1 mg capsule   No No   Sig: Take 1 capsule (1 mg total) by mouth daily at bedtime   traZODone (DESYREL) 50 mg tablet   No No   Sig: Take 1 tablet (50 mg total) by mouth daily at bedtime as needed for sleep      Facility-Administered Medications: None       Past Medical History:   Diagnosis Date    Alcohol abuse     Anxiety     Major depressive disorder     Psychiatric disorder     anxiety, depression, PTSD    Psychiatric illness     PTSD (post-traumatic stress disorder)        Past Surgical History:   Procedure Laterality Date    INDUCED          Family History   Problem Relation Age of Onset    No Known Problems Mother     No Known Problems Father     No Known Problems Sister     No Known Problems Brother     No Known Problems Maternal Aunt     No Known Problems Paternal Aunt     No Known Problems Maternal Uncle     No Known Problems Paternal Uncle     No Known Problems Maternal Grandfather     No Known Problems Maternal Grandmother     No Known Problems Paternal Grandfather     No Known Problems Paternal Grandmother     No Known Problems Cousin     ADD / ADHD Neg Hx     Alcohol abuse Neg Hx     Anxiety disorder Neg Hx     Bipolar disorder Neg Hx     Completed Suicide  Neg Hx     Dementia Neg Hx     Depression Neg Hx     Drug abuse Neg Hx     OCD Neg Hx     Psychiatric Illness Neg Hx     Psychosis Neg Hx     Schizoaffective Disorder  Neg Hx     Schizophrenia Neg Hx     Self-Injury Neg Hx     Suicide Attempts Neg Hx      I have reviewed and agree with the history as documented      Social History     Tobacco Use    Smoking status: Current Every Day Smoker     Packs/day: 0 50     Types: Cigarettes    Smokeless tobacco: Never Used   Substance Use Topics    Alcohol use: Yes     Frequency: Monthly or less     Drinks per session: 3 or 4     Binge frequency: Less than monthly     Comment: Pt unsure of how many drinks she has per week   Drug use: No        Review of Systems   Constitutional: Positive for appetite change and fatigue  Negative for chills and fever  HENT: Negative for postnasal drip, sinus pain and trouble swallowing  Eyes: Negative for redness and itching  Respiratory: Negative for chest tightness, shortness of breath and wheezing  Cardiovascular: Negative for chest pain and leg swelling  Gastrointestinal: Negative for abdominal pain, constipation, diarrhea, nausea and vomiting  Endocrine: Negative  Genitourinary: Negative for difficulty urinating and dysuria  Musculoskeletal: Negative for back pain and myalgias  Skin: Negative for rash  Allergic/Immunologic: Negative  Neurological: Negative for dizziness, numbness and headaches  Hematological: Negative  Psychiatric/Behavioral: Positive for agitation, sleep disturbance and suicidal ideas  The patient is nervous/anxious and has insomnia  Physical Exam  Physical Exam   Constitutional: She is oriented to person, place, and time  She appears well-developed and well-nourished  HENT:   Head: Normocephalic and atraumatic  Nose: Nose normal    Mouth/Throat: Oropharynx is clear and moist    Eyes: Pupils are equal, round, and reactive to light  Conjunctivae and EOM are normal    Neck: Normal range of motion  Neck supple  Cardiovascular: Normal rate, regular rhythm and normal heart sounds  Pulmonary/Chest: Effort normal and breath sounds normal  No respiratory distress  She has no wheezes  Abdominal: Soft  Bowel sounds are normal  There is no tenderness  There is no guarding  Musculoskeletal: She exhibits no edema, tenderness or deformity  Neurological: She is alert and oriented to person, place, and time  Skin: Skin is warm and dry  Capillary refill takes less than 2 seconds  No rash noted     Psychiatric: Judgment normal  Her affect is labile  She is withdrawn  Cognition and memory are normal  She exhibits a depressed mood  She expresses suicidal ideation  She expresses no homicidal ideation  She expresses no suicidal plans and no homicidal plans  Nursing note and vitals reviewed        Vital Signs  ED Triage Vitals   Temperature Pulse Respirations Blood Pressure SpO2   09/03/19 1338 09/03/19 1338 09/03/19 1338 09/03/19 1338 09/03/19 1338   99 °F (37 2 °C) 95 16 125/77 99 %      Temp src Heart Rate Source Patient Position - Orthostatic VS BP Location FiO2 (%)   -- 09/03/19 1901 09/03/19 1901 09/03/19 1901 --    Monitor Sitting Left arm       Pain Score       09/03/19 1338       7           Vitals:    09/03/19 1338 09/03/19 1901   BP: 125/77 134/78   Pulse: 95 83   Patient Position - Orthostatic VS:  Sitting         Visual Acuity      ED Medications  Medications   LORazepam (ATIVAN) tablet 1 mg (1 mg Oral Given 9/3/19 1426)       Diagnostic Studies  Results Reviewed     Procedure Component Value Units Date/Time    POCT alcohol breath test [[id number]]     Lab Status:  No result     POCT alcohol breath test [[id number]]  (Normal) Resulted:  09/03/19 1847    Lab Status:  Final result Updated:  09/03/19 1847     EXTBreath Alcohol 0 124    Lithium level [[id number]]  (Abnormal) Collected:  09/03/19 1627    Lab Status:  Final result Specimen:  Blood from Arm, Left Updated:  09/03/19 1643     Lithium Lvl <0 2 mmol/L     POCT pregnancy, urine [[id number]]  (Normal) Resulted:  09/03/19 1520    Lab Status:  Final result Updated:  09/03/19 1520     EXT PREG TEST UR (Ref: Negative) negative     Control valid    Rapid drug screen, urine [[id number]]  (Abnormal) Collected:  09/03/19 1432    Lab Status:  Final result Specimen:  Urine, Clean Catch Updated:  09/03/19 1514     Amph/Meth UR Negative     Barbiturate Ur Negative     Benzodiazepine Urine Negative     Cocaine Urine Negative     Methadone Urine Negative     Opiate Urine Negative     PCP Ur Negative     THC Urine Positive    Narrative:       Presumptive report  If requested, specimen will be sent to reference lab for confirmation  FOR MEDICAL PURPOSES ONLY  IF CONFIRMATION NEEDED PLEASE CONTACT THE LAB WITHIN 5 DAYS      Drug Screen Cutoff Levels:  AMPHETAMINE/METHAMPHETAMINES  1000 ng/mL  BARBITURATES     200 ng/mL  BENZODIAZEPINES     200 ng/mL  COCAINE      300 ng/mL  METHADONE      300 ng/mL  OPIATES      300 ng/mL  PHENCYCLIDINE     25 ng/mL  THC       50 ng/mL      UA w Reflex to Microscopic [[id number]]  (Normal) Collected:  09/03/19 1432    Lab Status:  Final result Specimen:  Urine, Clean Catch Updated:  09/03/19 1458     Color, UA Straw     Clarity, UA Clear     Specific Gravity, UA 1 010     pH, UA 5 0     Leukocytes, UA Negative     Nitrite, UA Negative     Protein, UA Negative mg/dl      Glucose, UA Negative mg/dl      Ketones, UA Negative mg/dl      Bilirubin, UA Negative     Blood, UA Negative     UROBILINOGEN UA Negative mg/dL     Ethanol [[id number]]  (Abnormal) Collected:  09/03/19 1432    Lab Status:  Final result Specimen:  Blood from Arm, Left Updated:  09/03/19 1456     Ethanol Lvl 248 mg/dL     Comprehensive metabolic panel [[id number]]  (Abnormal) Collected:  09/03/19 1432    Lab Status:  Final result Specimen:  Blood from Arm, Left Updated:  09/03/19 1454     Sodium 145 mmol/L      Potassium 4 1 mmol/L      Chloride 108 mmol/L      CO2 23 mmol/L      ANION GAP 14 mmol/L      BUN <2 mg/dL      Creatinine 0 49 mg/dL      Glucose 138 mg/dL      Calcium 9 0 mg/dL      AST 35 U/L      ALT 45 U/L      Alkaline Phosphatase 93 U/L      Total Protein 8 0 g/dL      Albumin 4 5 g/dL      Total Bilirubin <0 10 mg/dL      eGFR 120 ml/min/1 73sq m     Narrative:       [facility] guidelines for Chronic Kidney Disease (CKD):     Stage 1 with normal or high GFR (GFR > 90 mL/min/1 73 square meters)    Stage 2 Mild CKD (GFR = 60-89 mL/min/1 73 square meters)    Stage 3A Moderate CKD (GFR = 45-59 mL/min/1 73 square meters)    Stage 3B Moderate CKD (GFR = 30-44 mL/min/1 73 square meters)    Stage 4 Severe CKD (GFR = 15-29 mL/min/1 73 square meters)    Stage 5 End Stage CKD (GFR <15 mL/min/1 73 square meters)  Note: GFR calculation is accurate only with a steady state creatinine    CBC and differential [[id number]]  (Abnormal) Collected:  09/03/19 1432    Lab Status:  Final result Specimen:  Blood from Arm, Left Updated:  09/03/19 1449     WBC 14 70 Thousand/uL      RBC 4 79 Million/uL      Hemoglobin 15 0 g/dL      Hematocrit 44 5 %      MCV 93 fL      MCH 31 4 pg      MCHC 33 7 g/dL      RDW 15 6 %      MPV 6 8 fL      Platelets 409 Thousands/uL                  No orders to display              Procedures  Procedures       ED Course  ED Course as of Sep 03 2005   Tue Sep 03, 2019   1502 Patient insisting that she wants to leave  She reports that she is no longer suicidal but will remain drunk until  2130  MDM  Number of Diagnoses or Management Options  Alcohol abuse:   Depression:   Diagnosis management comments: 71-year-old female presents for psychiatric evaluation  Initially when she was intoxicated she was reporting suicidality but quickly recanted this  The patient was observed until she was noted to be sober  Upon achieving sobriety, the patient denies any thoughts of self-harm or suicide  She really has resources in place for her depression and declines any options of rehab at this point time  She is aware of the need for follow-up along with reasons return to the ER and she is able to contract for safety         Amount and/or Complexity of Data Reviewed  Clinical lab tests: ordered and reviewed  Tests in the medicine section of CPT®: ordered and reviewed        Disposition  Final diagnoses:   Depression   Alcohol abuse     Time reflects when diagnosis was documented in both MDM as applicable and the Disposition within this note Time User Action Codes Description Comment    9/3/2019  8:02 PM Cori Dan Add [F32 9] Depression     9/3/2019  8:02 PM Cori Dan Add [F10 10] Alcohol abuse       ED Disposition     ED Disposition Condition Date/Time Comment    Discharge Stable Tue Sep 3, 2019  8:02 PM Kyara Hanson discharge to home/self care  Follow-up Information    None         Patient's Medications   Discharge Prescriptions    No medications on file     No discharge procedures on file      ED Provider  Electronically Signed by           Jostin Rey DO  09/03/19 2005
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
## 2019-09-04 ENCOUNTER — HOSPITAL ENCOUNTER (INPATIENT)
Facility: HOSPITAL | Age: 44
LOS: 9 days | Discharge: HOME | DRG: 753 | End: 2019-09-13
Attending: PSYCHIATRY & NEUROLOGY | Admitting: PSYCHIATRY & NEUROLOGY
Payer: COMMERCIAL

## 2019-09-04 VITALS
DIASTOLIC BLOOD PRESSURE: 68 MMHG | SYSTOLIC BLOOD PRESSURE: 121 MMHG | WEIGHT: 187.39 LBS | HEART RATE: 86 BPM | OXYGEN SATURATION: 98 % | TEMPERATURE: 98.1 F | RESPIRATION RATE: 16 BRPM | BODY MASS INDEX: 34.48 KG/M2 | HEIGHT: 62 IN

## 2019-09-04 DIAGNOSIS — F31.81 BIPOLAR II DISORDER (HCC): ICD-10-CM

## 2019-09-04 DIAGNOSIS — R45.851 SUICIDAL IDEATIONS: ICD-10-CM

## 2019-09-04 DIAGNOSIS — G47.00 INSOMNIA: Primary | ICD-10-CM

## 2019-09-04 DIAGNOSIS — F17.200 SMOKING ADDICTION: ICD-10-CM

## 2019-09-04 DIAGNOSIS — F51.5 NIGHTMARES: ICD-10-CM

## 2019-09-04 RX ORDER — LITHIUM CARBONATE 450 MG
450 TABLET, EXTENDED RELEASE ORAL EVERY 12 HOURS SCHEDULED
Status: DISCONTINUED | OUTPATIENT
Start: 2019-09-04 | End: 2019-09-13 | Stop reason: HOSPADM

## 2019-09-04 RX ORDER — ACETAMINOPHEN 325 MG/1
350 TABLET ORAL EVERY 6 HOURS PRN
Status: CANCELLED | OUTPATIENT
Start: 2019-09-04

## 2019-09-04 RX ORDER — ACETAMINOPHEN 325 MG/1
350 TABLET ORAL EVERY 6 HOURS PRN
Status: DISCONTINUED | OUTPATIENT
Start: 2019-09-04 | End: 2019-09-13 | Stop reason: HOSPADM

## 2019-09-04 RX ORDER — OLANZAPINE 10 MG/1
10 INJECTION, POWDER, LYOPHILIZED, FOR SOLUTION INTRAMUSCULAR EVERY 8 HOURS PRN
Status: CANCELLED | OUTPATIENT
Start: 2019-09-04

## 2019-09-04 RX ORDER — BENZTROPINE MESYLATE 1 MG/ML
1 INJECTION INTRAMUSCULAR; INTRAVENOUS EVERY 6 HOURS PRN
Status: DISCONTINUED | OUTPATIENT
Start: 2019-09-04 | End: 2019-09-13 | Stop reason: HOSPADM

## 2019-09-04 RX ORDER — NICOTINE 21 MG/24HR
1 PATCH, TRANSDERMAL 24 HOURS TRANSDERMAL DAILY
Status: DISCONTINUED | OUTPATIENT
Start: 2019-09-04 | End: 2019-09-04

## 2019-09-04 RX ORDER — LORAZEPAM 0.5 MG/1
1 TABLET ORAL EVERY 6 HOURS PRN
Status: CANCELLED | OUTPATIENT
Start: 2019-09-04

## 2019-09-04 RX ORDER — HALOPERIDOL 5 MG
5 TABLET ORAL EVERY 6 HOURS PRN
Status: DISCONTINUED | OUTPATIENT
Start: 2019-09-04 | End: 2019-09-13 | Stop reason: HOSPADM

## 2019-09-04 RX ORDER — TRAZODONE HYDROCHLORIDE 100 MG/1
150 TABLET ORAL ONCE
Status: COMPLETED | OUTPATIENT
Start: 2019-09-04 | End: 2019-09-04

## 2019-09-04 RX ORDER — OLANZAPINE 10 MG/1
10 TABLET ORAL EVERY 8 HOURS PRN
Status: CANCELLED | OUTPATIENT
Start: 2019-09-04

## 2019-09-04 RX ORDER — HALOPERIDOL 5 MG/ML
5 INJECTION INTRAMUSCULAR EVERY 6 HOURS PRN
Status: CANCELLED | OUTPATIENT
Start: 2019-09-04

## 2019-09-04 RX ORDER — OLANZAPINE 10 MG/1
10 TABLET ORAL EVERY 8 HOURS PRN
Status: DISCONTINUED | OUTPATIENT
Start: 2019-09-04 | End: 2019-09-13 | Stop reason: HOSPADM

## 2019-09-04 RX ORDER — TRAZODONE HYDROCHLORIDE 50 MG/1
50 TABLET ORAL
Status: DISCONTINUED | OUTPATIENT
Start: 2019-09-04 | End: 2019-09-13 | Stop reason: HOSPADM

## 2019-09-04 RX ORDER — NICOTINE 21 MG/24HR
1 PATCH, TRANSDERMAL 24 HOURS TRANSDERMAL DAILY
Status: CANCELLED | OUTPATIENT
Start: 2019-09-04

## 2019-09-04 RX ORDER — HYDROXYZINE HYDROCHLORIDE 25 MG/1
25 TABLET, FILM COATED ORAL EVERY 6 HOURS PRN
Status: DISCONTINUED | OUTPATIENT
Start: 2019-09-04 | End: 2019-09-13 | Stop reason: HOSPADM

## 2019-09-04 RX ORDER — ACETAMINOPHEN 325 MG/1
975 TABLET ORAL EVERY 6 HOURS PRN
Status: DISCONTINUED | OUTPATIENT
Start: 2019-09-04 | End: 2019-09-13 | Stop reason: HOSPADM

## 2019-09-04 RX ORDER — ACETAMINOPHEN 325 MG/1
975 TABLET ORAL EVERY 6 HOURS PRN
Status: CANCELLED | OUTPATIENT
Start: 2019-09-04

## 2019-09-04 RX ORDER — MAGNESIUM HYDROXIDE/ALUMINUM HYDROXICE/SIMETHICONE 120; 1200; 1200 MG/30ML; MG/30ML; MG/30ML
30 SUSPENSION ORAL EVERY 4 HOURS PRN
Status: CANCELLED | OUTPATIENT
Start: 2019-09-04

## 2019-09-04 RX ORDER — RISPERIDONE 1 MG/1
1 TABLET, ORALLY DISINTEGRATING ORAL
Status: CANCELLED | OUTPATIENT
Start: 2019-09-04

## 2019-09-04 RX ORDER — TRAZODONE HYDROCHLORIDE 100 MG/1
50 TABLET ORAL
Status: CANCELLED | OUTPATIENT
Start: 2019-09-04

## 2019-09-04 RX ORDER — RISPERIDONE 1 MG/1
1 TABLET, ORALLY DISINTEGRATING ORAL
Status: DISCONTINUED | OUTPATIENT
Start: 2019-09-04 | End: 2019-09-13 | Stop reason: HOSPADM

## 2019-09-04 RX ORDER — LORAZEPAM 1 MG/1
1 TABLET ORAL EVERY 6 HOURS PRN
Status: DISCONTINUED | OUTPATIENT
Start: 2019-09-04 | End: 2019-09-12

## 2019-09-04 RX ORDER — HYDROXYZINE HYDROCHLORIDE 25 MG/1
25 TABLET, FILM COATED ORAL EVERY 6 HOURS PRN
Status: CANCELLED | OUTPATIENT
Start: 2019-09-04

## 2019-09-04 RX ORDER — GABAPENTIN 100 MG/1
300 CAPSULE ORAL DAILY
Status: CANCELLED | OUTPATIENT
Start: 2019-09-05

## 2019-09-04 RX ORDER — LITHIUM CARBONATE 450 MG
450 TABLET, EXTENDED RELEASE ORAL EVERY 12 HOURS SCHEDULED
Status: CANCELLED | OUTPATIENT
Start: 2019-09-04

## 2019-09-04 RX ORDER — DULOXETIN HYDROCHLORIDE 60 MG/1
60 CAPSULE, DELAYED RELEASE ORAL DAILY
Status: DISCONTINUED | OUTPATIENT
Start: 2019-09-04 | End: 2019-09-04 | Stop reason: HOSPADM

## 2019-09-04 RX ORDER — HYDROXYZINE HYDROCHLORIDE 25 MG/1
50 TABLET, FILM COATED ORAL ONCE
Status: COMPLETED | OUTPATIENT
Start: 2019-09-04 | End: 2019-09-04

## 2019-09-04 RX ORDER — BENZTROPINE MESYLATE 1 MG/1
1 TABLET ORAL EVERY 6 HOURS PRN
Status: DISCONTINUED | OUTPATIENT
Start: 2019-09-04 | End: 2019-09-13 | Stop reason: HOSPADM

## 2019-09-04 RX ORDER — DULOXETIN HYDROCHLORIDE 30 MG/1
60 CAPSULE, DELAYED RELEASE ORAL DAILY
Status: DISCONTINUED | OUTPATIENT
Start: 2019-09-05 | End: 2019-09-13 | Stop reason: HOSPADM

## 2019-09-04 RX ORDER — OLANZAPINE 10 MG/1
10 INJECTION, POWDER, LYOPHILIZED, FOR SOLUTION INTRAMUSCULAR EVERY 8 HOURS PRN
Status: DISCONTINUED | OUTPATIENT
Start: 2019-09-04 | End: 2019-09-13 | Stop reason: HOSPADM

## 2019-09-04 RX ORDER — BENZTROPINE MESYLATE 1 MG/ML
1 INJECTION INTRAMUSCULAR; INTRAVENOUS EVERY 6 HOURS PRN
Status: CANCELLED | OUTPATIENT
Start: 2019-09-04

## 2019-09-04 RX ORDER — LORAZEPAM 2 MG/ML
2 INJECTION INTRAMUSCULAR EVERY 6 HOURS PRN
Status: DISCONTINUED | OUTPATIENT
Start: 2019-09-04 | End: 2019-09-13 | Stop reason: HOSPADM

## 2019-09-04 RX ORDER — HALOPERIDOL 5 MG/ML
5 INJECTION INTRAMUSCULAR EVERY 6 HOURS PRN
Status: DISCONTINUED | OUTPATIENT
Start: 2019-09-04 | End: 2019-09-13 | Stop reason: HOSPADM

## 2019-09-04 RX ORDER — BENZTROPINE MESYLATE 0.5 MG/1
1 TABLET ORAL EVERY 6 HOURS PRN
Status: CANCELLED | OUTPATIENT
Start: 2019-09-04

## 2019-09-04 RX ORDER — MAGNESIUM HYDROXIDE/ALUMINUM HYDROXICE/SIMETHICONE 120; 1200; 1200 MG/30ML; MG/30ML; MG/30ML
30 SUSPENSION ORAL EVERY 4 HOURS PRN
Status: DISCONTINUED | OUTPATIENT
Start: 2019-09-04 | End: 2019-09-13 | Stop reason: HOSPADM

## 2019-09-04 RX ORDER — LORAZEPAM 2 MG/ML
2 INJECTION INTRAMUSCULAR EVERY 6 HOURS PRN
Status: CANCELLED | OUTPATIENT
Start: 2019-09-04

## 2019-09-04 RX ORDER — HYDROXYZINE 50 MG/1
50 TABLET, FILM COATED ORAL EVERY 6 HOURS PRN
Status: DISCONTINUED | OUTPATIENT
Start: 2019-09-04 | End: 2019-09-13 | Stop reason: HOSPADM

## 2019-09-04 RX ORDER — HALOPERIDOL 5 MG
5 TABLET ORAL EVERY 6 HOURS PRN
Status: CANCELLED | OUTPATIENT
Start: 2019-09-04

## 2019-09-04 RX ORDER — GABAPENTIN 300 MG/1
600 CAPSULE ORAL DAILY
Status: DISCONTINUED | OUTPATIENT
Start: 2019-09-04 | End: 2019-09-04 | Stop reason: HOSPADM

## 2019-09-04 RX ORDER — LITHIUM CARBONATE 450 MG
450 TABLET, EXTENDED RELEASE ORAL EVERY 12 HOURS SCHEDULED
Status: DISCONTINUED | OUTPATIENT
Start: 2019-09-04 | End: 2019-09-04 | Stop reason: HOSPADM

## 2019-09-04 RX ORDER — HYDROXYZINE HYDROCHLORIDE 25 MG/1
50 TABLET, FILM COATED ORAL EVERY 6 HOURS PRN
Status: CANCELLED | OUTPATIENT
Start: 2019-09-04

## 2019-09-04 RX ORDER — ACETAMINOPHEN 325 MG/1
650 TABLET ORAL EVERY 4 HOURS PRN
Status: CANCELLED | OUTPATIENT
Start: 2019-09-04

## 2019-09-04 RX ORDER — ACETAMINOPHEN 325 MG/1
650 TABLET ORAL EVERY 4 HOURS PRN
Status: DISCONTINUED | OUTPATIENT
Start: 2019-09-04 | End: 2019-09-13 | Stop reason: HOSPADM

## 2019-09-04 RX ORDER — DULOXETIN HYDROCHLORIDE 60 MG/1
60 CAPSULE, DELAYED RELEASE ORAL DAILY
Status: CANCELLED | OUTPATIENT
Start: 2019-09-05

## 2019-09-04 RX ORDER — GABAPENTIN 300 MG/1
300 CAPSULE ORAL DAILY
Status: DISCONTINUED | OUTPATIENT
Start: 2019-09-05 | End: 2019-09-05

## 2019-09-04 RX ADMIN — HYDROXYZINE HYDROCHLORIDE 50 MG: 25 TABLET ORAL at 07:31

## 2019-09-04 RX ADMIN — LITHIUM CARBONATE 450 MG: 450 TABLET, EXTENDED RELEASE ORAL at 08:26

## 2019-09-04 RX ADMIN — LORAZEPAM 1 MG: 0.5 TABLET ORAL at 00:05

## 2019-09-04 RX ADMIN — HYDROXYZINE HYDROCHLORIDE 50 MG: 50 TABLET, FILM COATED ORAL at 21:53

## 2019-09-04 RX ADMIN — DULOXETINE HYDROCHLORIDE 60 MG: 60 CAPSULE, DELAYED RELEASE ORAL at 08:26

## 2019-09-04 RX ADMIN — GABAPENTIN 600 MG: 300 CAPSULE ORAL at 08:26

## 2019-09-04 RX ADMIN — LITHIUM CARBONATE 450 MG: 450 TABLET, EXTENDED RELEASE ORAL at 21:52

## 2019-09-04 RX ADMIN — NICOTINE POLACRILEX 2 MG: 2 GUM, CHEWING BUCCAL at 17:33

## 2019-09-04 RX ADMIN — TRAZODONE HYDROCHLORIDE 50 MG: 50 TABLET ORAL at 21:53

## 2019-09-04 RX ADMIN — TRAZODONE HYDROCHLORIDE 150 MG: 100 TABLET ORAL at 00:39

## 2019-09-04 NOTE — PROGRESS NOTES
Pt's belongings at admission  At bedside   Brush   Makeup  2 tank tops   2 shirts   Underwear   Cardigan   Leggings   Pants    Belongings in locker   2 packs of cigarettes  Lighter  Ring   Pa id card  Access card  Insurance cards x2  Debit cards x3  Notebook  Sunglasses  Shortjim King  Tote bag      Belongings sent to security  136 $ in cash   A bag full of change

## 2019-09-04 NOTE — ED NOTES
EVS (Eligibility Verification System)  Automated system indicates:   [address] for Transportation:    Rodolfo Isidro if transport is needed

## 2019-09-04 NOTE — ED NOTES
Crisis worker met with patient (Pt ) once sober  Pt denies SI/HI/AH/VH  Pt does acknowledge that she feels overwhelmed with her current situation, but does not feel that she needs to be hospitalized because of it  She was scheduled for an intake outpatient therapy appointment today, but did not attend due to being in the ED  She could not remember the name of the provider, but stated that she had the contact information and would follow up with them tomorrow  Pt does not meet criteria for inpatient 201/302 at this time  Pt is aware to return to the ED if symptoms worsen and verbalized understanding  Pt was speaking clearly and remained alert throughout meeting
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
## 2019-09-04 NOTE — ED CARE HANDOFF
Emergency Department Sign Out Note           out to me by previous provider  The patient is a 23-year-old female who is in the emergency department for evaluation for suicidal ideation and alcohol intoxication awaiting psychiatric placement at this point time  Home medications placed by myself this morning to continue with her home medication regimen  Procedures  MDM    Disposition  Final diagnoses:   Depression   Alcohol abuse     Time reflects when diagnosis was documented in both MDM as applicable and the Disposition within this note     Time User Action Codes Description Comment    9/3/2019  8:02 PM Mitch Himanshu Add [F32 9] Depression     9/3/2019  8:02 PM Mitch Himanshu Add [F10 10] Alcohol abuse       ED Disposition     ED Disposition Condition Date/Time Comment    Discharge Stable Tue Sep 3, 2019  8:02 PM Mayra Shabazz discharge to home/self care  Follow-up Information    None       Discharge Medication List as of 9/3/2019  8:04 PM      CONTINUE these medications which have NOT CHANGED    Details   DULoxetine (CYMBALTA) 60 mg delayed release capsule Take 1 capsule (60 mg total) by mouth daily, Starting Thu 8/29/2019, Print      gabapentin (NEURONTIN) 300 mg capsule Take 2 capsules (600 mg total) by mouth 3 (three) times a day, Starting Thu 8/29/2019, Print      lithium carbonate (LITHOBID) 450 mg CR tablet Take 1 tablet (450 mg total) by mouth every 12 (twelve) hours, Starting Thu 8/29/2019, Print      prazosin (MINIPRESS) 1 mg capsule Take 1 capsule (1 mg total) by mouth daily at bedtime, Starting Thu 8/29/2019, Print      traZODone (DESYREL) 50 mg tablet Take 1 tablet (50 mg total) by mouth daily at bedtime as needed for sleep, Starting Thu 8/29/2019, Print      nicotine (NICODERM CQ) 21 mg/24 hr TD 24 hr patch Place 1 patch on the skin daily, Starting Fri 8/30/2019, Print           No discharge procedures on file         ED Provider  Electronically Signed by     Johanny Perez DO  09/04/19 5414
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
## 2019-09-04 NOTE — ED NOTES
Pt denies SI/HI/AH/VH  Pt speech clear and gait steady        Mariely Aguiar, JENNIFER  09/03/19 2052

## 2019-09-04 NOTE — ED PROVIDER NOTES
History  Chief Complaint   Patient presents with    Psychiatric Evaluation     I have suicidal tendencies  HPI    This is a 28-year-old female has recently been discharged from the emergency department for suicidal ideations that were only when she was intoxicated  Patient was discharged at that time and declined rehab  Patient went to a [employer] had few glasses of wine is saw steak knife and she could not contract for safety and felt unstable that point and should return to the emergency department for further evaluation  Patient is currently suicidal   Prior to Admission Medications   Prescriptions Last Dose Informant Patient Reported? Taking?    DULoxetine (CYMBALTA) 60 mg delayed release capsule   No No   Sig: Take 1 capsule (60 mg total) by mouth daily   gabapentin (NEURONTIN) 300 mg capsule   No No   Sig: Take 2 capsules (600 mg total) by mouth 3 (three) times a day   lithium carbonate (LITHOBID) 450 mg CR tablet   No No   Sig: Take 1 tablet (450 mg total) by mouth every 12 (twelve) hours   nicotine (NICODERM CQ) 21 mg/24 hr TD 24 hr patch   No No   Sig: Place 1 patch on the skin daily   prazosin (MINIPRESS) 1 mg capsule   No No   Sig: Take 1 capsule (1 mg total) by mouth daily at bedtime   traZODone (DESYREL) 50 mg tablet   No No   Sig: Take 1 tablet (50 mg total) by mouth daily at bedtime as needed for sleep      Facility-Administered Medications: None       Past Medical History:   Diagnosis Date    Alcohol abuse     Anxiety     Major depressive disorder     Psychiatric disorder     anxiety, depression, PTSD    Psychiatric illness     PTSD (post-traumatic stress disorder)        Past Surgical History:   Procedure Laterality Date    INDUCED          Family History   Problem Relation Age of Onset    No Known Problems Mother     No Known Problems Father     No Known Problems Sister     No Known Problems Brother     No Known Problems Maternal Aunt     No Known Problems Paternal Aunt     No Known Problems Maternal Uncle     No Known Problems Paternal Uncle     No Known Problems Maternal Grandfather     No Known Problems Maternal Grandmother     No Known Problems Paternal Grandfather     No Known Problems Paternal Grandmother     No Known Problems Cousin     ADD / ADHD Neg Hx     Alcohol abuse Neg Hx     Anxiety disorder Neg Hx     Bipolar disorder Neg Hx     Completed Suicide  Neg Hx     Dementia Neg Hx     Depression Neg Hx     Drug abuse Neg Hx     OCD Neg Hx     Psychiatric Illness Neg Hx     Psychosis Neg Hx     Schizoaffective Disorder  Neg Hx     Schizophrenia Neg Hx     Self-Injury Neg Hx     Suicide Attempts Neg Hx      I have reviewed and agree with the history as documented  Social History     Tobacco Use    Smoking status: Current Every Day Smoker     Packs/day: 0 50     Types: Cigarettes    Smokeless tobacco: Never Used   Substance Use Topics    Alcohol use: Yes     Frequency: Monthly or less     Drinks per session: 3 or 4     Binge frequency: Less than monthly     Comment: Pt unsure of how many drinks she has per week   Drug use: No        Review of Systems   Constitutional: Negative  HENT: Negative  Eyes: Negative  Respiratory: Negative  Cardiovascular: Negative  Gastrointestinal: Negative  Endocrine: Negative  Genitourinary: Negative  Musculoskeletal: Negative  Skin: Negative  Allergic/Immunologic: Negative  Neurological: Negative  Hematological: Negative  Psychiatric/Behavioral: Negative  Physical Exam  Physical Exam   Constitutional: She is oriented to person, place, and time  She appears well-developed and well-nourished  HENT:   Head: Normocephalic and atraumatic  Right Ear: External ear normal    Left Ear: External ear normal    Nose: Nose normal    Mouth/Throat: Oropharynx is clear and moist    Eyes: Pupils are equal, round, and reactive to light   Conjunctivae and EOM are normal  Neck: Normal range of motion  Neck supple  Cardiovascular: Normal rate, regular rhythm, normal heart sounds and intact distal pulses  Pulmonary/Chest: Effort normal and breath sounds normal    Abdominal: Soft  Bowel sounds are normal    Genitourinary:   Genitourinary Comments: Exam deferred  Musculoskeletal: Normal range of motion  Neurological: She is alert and oriented to person, place, and time  Skin: Skin is warm and dry  Capillary refill takes less than 2 seconds  Psychiatric: She has a normal mood and affect  Her behavior is normal    Nursing note and vitals reviewed  Vital Signs  ED Triage Vitals [09/03/19 2238]   Temperature Pulse Respirations Blood Pressure SpO2   98 1 °F (36 7 °C) (!) 107 22 151/94 98 %      Temp Source Heart Rate Source Patient Position - Orthostatic VS BP Location FiO2 (%)   Tympanic Monitor Sitting Left arm --      Pain Score       --           Vitals:    09/03/19 2238   BP: 151/94   Pulse: (!) 107   Patient Position - Orthostatic VS: Sitting         Visual Acuity      ED Medications  Medications   traZODone (DESYREL) tablet 150 mg (has no administration in time range)   LORazepam (ATIVAN) tablet 1 mg (1 mg Oral Given 9/4/19 0005)       Diagnostic Studies  Results Reviewed     Procedure Component Value Units Date/Time    Rapid drug screen, urine [[id number]]  (Abnormal) Collected:  09/03/19 2332    Lab Status:  Final result Specimen:  Urine, Clean Catch Updated:  09/03/19 3367     Amph/Meth UR Negative     Barbiturate Ur Negative     Benzodiazepine Urine Negative     Cocaine Urine Negative     Methadone Urine Negative     Opiate Urine Negative     PCP Ur Negative     THC Urine Positive    Narrative:       Presumptive report  If requested, specimen will be sent to reference lab for confirmation  FOR MEDICAL PURPOSES ONLY  IF CONFIRMATION NEEDED PLEASE CONTACT THE LAB WITHIN 5 DAYS      Drug Screen Cutoff Levels:  AMPHETAMINE/METHAMPHETAMINES  1000 ng/mL  BARBITURATES     200 ng/mL  BENZODIAZEPINES     200 ng/mL  COCAINE      300 ng/mL  METHADONE      300 ng/mL  OPIATES      300 ng/mL  PHENCYCLIDINE     25 ng/mL  THC       50 ng/mL      POCT alcohol breath test [[id number]]  (Normal) Resulted:  09/03/19 2329    Lab Status:  Final result Updated:  09/03/19 2329     EXTBreath Alcohol 0 096- Taken by [facility] ED tech                 No orders to display              Procedures  Procedures       ED Course                               MDM  Number of Diagnoses or Management Options  Alcohol abuse:   Suicidal ideations:   Diagnosis management comments: This is a pleasant 72-year-old female presents to the emergency department status post ER evaluation earlier today  Patient noted that she left the ER after declining rehab and feels that she is not safe when she was eating at a restaurant saw steak knife and she felt like she needed to stab herself  Patient consumed  3 glasses of wine prior to coming back to the emergency department for further evaluation patient will need to be admitted under 201 admission  Disposition  Final diagnoses:   Suicidal ideations   Alcohol abuse     Time reflects when diagnosis was documented in both MDM as applicable and the Disposition within this note     Time User Action Codes Description Comment    9/4/2019 12:32 AM Robert, [address] Suicidal ideations     9/4/2019 12:32 AM Best Jensen Add [F10 10] Alcohol abuse       ED Disposition     ED Disposition Condition Date/Time Comment    Transfer to Emig[facility] Sep 4, 2019 12:32 AM Rod Randall should be transferred out to [address] [place] [address] and has been medically cleared  Follow-up Information    None         Patient's Medications   Discharge Prescriptions    No medications on file     No discharge procedures on file      ED Provider  Electronically Signed by           Annabella Phan III, DO  09/04/19 9991

## 2019-09-04 NOTE — ED NOTES
Pt stated "I just like I have all these thoughts running through my head at night that I cannot shake  I'm just anxious still  If I could have something to make me feel better until I get a room that'd be good " I will inform provider, pt nilo feliz and doris at this time upon request      John Ye  09/04/19 0853

## 2019-09-04 NOTE — TREATMENT PLAN
Nurse to meet with patient twice daily to discuss diagnosis, medications, review progress and provide support as needed

## 2019-09-04 NOTE — ED NOTES
Pt left before meds being returned  Pt was unable to be contacted  Pt meds returned and signed back into pharmacy  per policy pharmacy can hold med's for up to two weeks          Zeenat Bell  09/03/19 7411

## 2019-09-04 NOTE — ED NOTES
Insurance Authorization for admission:   Phone call placed to [facility]  Phone number: [phone]  Spoke to Johnson Hoang  3 days approved, LCD 9/6  Level of care: IP   Review on 9/6  Authorization # [id number]  EVS (Eligibility Verification System) called - [phone]  Automated system indicates: Eligible  Insurance Authorization for Transportation:    Request faxed to [email or web address]  Fax number [phone]

## 2019-09-05 LAB
BASOPHILS # BLD AUTO: 0.07 THOUSANDS/ΜL (ref 0–0.1)
BASOPHILS NFR BLD AUTO: 1 % (ref 0–1)
CHOLEST SERPL-MCNC: 106 MG/DL (ref 50–200)
EOSINOPHIL # BLD AUTO: 0.65 THOUSAND/ΜL (ref 0–0.61)
EOSINOPHIL NFR BLD AUTO: 6 % (ref 0–6)
ERYTHROCYTE [DISTWIDTH] IN BLOOD BY AUTOMATED COUNT: 14 % (ref 11.6–15.1)
EST. AVERAGE GLUCOSE BLD GHB EST-MCNC: 97 MG/DL
HBA1C MFR BLD: 5 % (ref 4.2–6.3)
HCG SERPL QL: NEGATIVE
HCT VFR BLD AUTO: 41.1 % (ref 34.8–46.1)
HDLC SERPL-MCNC: 19 MG/DL (ref 40–60)
HGB BLD-MCNC: 13.4 G/DL (ref 11.5–15.4)
IMM GRANULOCYTES # BLD AUTO: 0.03 THOUSAND/UL (ref 0–0.2)
IMM GRANULOCYTES NFR BLD AUTO: 0 % (ref 0–2)
LDLC SERPL CALC-MCNC: 42 MG/DL (ref 0–100)
LYMPHOCYTES # BLD AUTO: 4.41 THOUSANDS/ΜL (ref 0.6–4.47)
LYMPHOCYTES NFR BLD AUTO: 41 % (ref 14–44)
MCH RBC QN AUTO: 30.7 PG (ref 26.8–34.3)
MCHC RBC AUTO-ENTMCNC: 32.6 G/DL (ref 31.4–37.4)
MCV RBC AUTO: 94 FL (ref 82–98)
MONOCYTES # BLD AUTO: 0.65 THOUSAND/ΜL (ref 0.17–1.22)
MONOCYTES NFR BLD AUTO: 6 % (ref 4–12)
NEUTROPHILS # BLD AUTO: 4.83 THOUSANDS/ΜL (ref 1.85–7.62)
NEUTS SEG NFR BLD AUTO: 46 % (ref 43–75)
NONHDLC SERPL-MCNC: 87 MG/DL
NRBC BLD AUTO-RTO: 0 /100 WBCS
PLATELET # BLD AUTO: 495 THOUSANDS/UL (ref 149–390)
PMV BLD AUTO: 8.9 FL (ref 8.9–12.7)
RBC # BLD AUTO: 4.37 MILLION/UL (ref 3.81–5.12)
TRIGL SERPL-MCNC: 223 MG/DL
WBC # BLD AUTO: 10.64 THOUSAND/UL (ref 4.31–10.16)

## 2019-09-05 PROCEDURE — 99221 1ST HOSP IP/OBS SF/LOW 40: CPT | Performed by: PSYCHIATRY & NEUROLOGY

## 2019-09-05 PROCEDURE — 83036 HEMOGLOBIN GLYCOSYLATED A1C: CPT | Performed by: PHYSICIAN ASSISTANT

## 2019-09-05 PROCEDURE — 85025 COMPLETE CBC W/AUTO DIFF WBC: CPT | Performed by: PHYSICIAN ASSISTANT

## 2019-09-05 PROCEDURE — NC001 PR NO CHARGE: Performed by: PHYSICIAN ASSISTANT

## 2019-09-05 PROCEDURE — 86592 SYPHILIS TEST NON-TREP QUAL: CPT | Performed by: PHYSICIAN ASSISTANT

## 2019-09-05 PROCEDURE — 84703 CHORIONIC GONADOTROPIN ASSAY: CPT | Performed by: PHYSICIAN ASSISTANT

## 2019-09-05 PROCEDURE — 80061 LIPID PANEL: CPT | Performed by: PHYSICIAN ASSISTANT

## 2019-09-05 RX ORDER — GABAPENTIN 300 MG/1
600 CAPSULE ORAL 3 TIMES DAILY
Status: DISCONTINUED | OUTPATIENT
Start: 2019-09-05 | End: 2019-09-13 | Stop reason: HOSPADM

## 2019-09-05 RX ORDER — PRAZOSIN HYDROCHLORIDE 1 MG/1
1 CAPSULE ORAL
Status: DISCONTINUED | OUTPATIENT
Start: 2019-09-05 | End: 2019-09-08

## 2019-09-05 RX ADMIN — TRAZODONE HYDROCHLORIDE 50 MG: 50 TABLET ORAL at 22:42

## 2019-09-05 RX ADMIN — GABAPENTIN 600 MG: 300 CAPSULE ORAL at 22:12

## 2019-09-05 RX ADMIN — GABAPENTIN 600 MG: 300 CAPSULE ORAL at 16:56

## 2019-09-05 RX ADMIN — LORAZEPAM 1 MG: 1 TABLET ORAL at 01:12

## 2019-09-05 RX ADMIN — NICOTINE POLACRILEX 2 MG: 2 GUM, CHEWING BUCCAL at 12:12

## 2019-09-05 RX ADMIN — HYDROXYZINE HYDROCHLORIDE 50 MG: 50 TABLET, FILM COATED ORAL at 19:28

## 2019-09-05 RX ADMIN — GABAPENTIN 300 MG: 300 CAPSULE ORAL at 08:42

## 2019-09-05 RX ADMIN — PRAZOSIN HYDROCHLORIDE 1 MG: 1 CAPSULE ORAL at 22:13

## 2019-09-05 RX ADMIN — LITHIUM CARBONATE 450 MG: 450 TABLET, EXTENDED RELEASE ORAL at 22:12

## 2019-09-05 RX ADMIN — LITHIUM CARBONATE 450 MG: 450 TABLET, EXTENDED RELEASE ORAL at 08:42

## 2019-09-05 RX ADMIN — DULOXETINE 60 MG: 30 CAPSULE, DELAYED RELEASE ORAL at 08:42

## 2019-09-05 RX ADMIN — NICOTINE POLACRILEX 2 MG: 2 GUM, CHEWING BUCCAL at 17:00

## 2019-09-05 NOTE — PROGRESS NOTES
Pt anxious at med window  Pt asking about scheduled medications  Very concerned about night time medication and sleeping  Gave pt a list of scheduled medications and went over with her  Pt accepted  Social on the unit  Pleasant with peers and staff
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
## 2019-09-05 NOTE — PROGRESS NOTES
Pt pacing the halls  Pt said that she is working on her anxiety  Pleasant, cooperative  Present in milieu, attending groups

## 2019-09-05 NOTE — PLAN OF CARE
Problem: Depression  Goal: Verbalize thoughts and feelings  Description  Interventions:  - Assess and re-assess patient's level of risk   - Engage patient in 1:1 interactions, daily, for a minimum of 15 minutes   - Encourage patient to express feelings, fears, frustrations, hopes   Outcome: Progressing  Goal: Refrain from isolation  Description  Interventions:  - Develop a trusting relationship   - Encourage socialization   Outcome: Progressing     Problem: Anxiety  Goal: Anxiety is at manageable level  Description  Interventions:  - Assess and monitor patient's anxiety level  - Monitor for signs and symptoms (heart palpitations, chest pain, shortness of breath, headaches, nausea, feeling jumpy, restlessness, irritable, apprehensive)  - Collaborate with interdisciplinary team and initiate plan and interventions as ordered    - [place] patient to unit/surroundings  - Explain treatment plan  - Encourage participation in care  - Encourage verbalization of concerns/fears  - Identify coping mechanisms  - Assist in developing anxiety-reducing skills  - Administer/offer alternative therapies  - Limit or eliminate stimulants  Outcome: Progressing     Problem: Ineffective Coping  Goal: Participates in unit activities  Description  Interventions:  - Provide therapeutic environment   - Provide required programming   - Redirect inappropriate behaviors   Outcome: Progressing
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
## 2019-09-05 NOTE — TREATMENT PLAN
[facility] 38 [facility]  1975 [facility] MRN: [id number]    [address] [place] [address] Room / Bed: [id number] Encounter: [id number]        Admit Date/Time:  9/4/2019  2:15 PM    Treatment Team: Attending Provider: Amy De La Cruz; Patient Care Technician: Devendra Mckee; Charge Nurse: Kermit Hamilton RN; Registered Nurse: Rubén Saucedo RN; : Alfredo Calvo; Patient Care Technician: Blaise Ashby;  Patient Care Assistant: Ezequiel Moreland; Registered Nurse: Marcina Bumpers, RN; Medications RN: Cayla Jenkins RN; Occupational Therapy Assistant: FRANCISCA Suarez    Diagnosis: Principal Problem:    [facility]  Active Problems:    HEAVEN (generalized anxiety disorder)    Patient Strengths/Assets: cooperative, compliant with medication, good past treatment response, motivation for treatment/growth, patient is on a voluntary commitment      Patient Barriers/Limitations: limited support system, low self esteem    Short Term Goals: decrease in depressive symptoms, decrease in anxiety symptoms, decrease in suicidal thoughts    Long Term Goals: improvement in depression, improvement in anxiety, stabilization of mood, free of suicidal thoughts, acceptance of need for psychiatric medications, acceptance of need for psychiatric treatment, acceptance of need for psychiatric follow up after discharge    Progress Towards Goals: starting psychiatric medications as prescribed    Recommended Treatment: medication management, patient medication education, group therapy, milieu therapy, continued Behavioral Health psychiatric evaluation/assessment process     Treatment Frequency: daily medication monitoring, group and milieu therapy daily, monitoring through interdisciplinary rounds, monitoring through weekly patient care conferences    Expected Discharge Date: 7 days - 9/12/2019    Discharge Plan: referral for outpatient medication management with a psychiatrist, referral for outpatient psychotherapy    Treatment Plan Created/Updated By: Jeffrey Beckwith
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
## 2019-09-05 NOTE — PROGRESS NOTES
Awakened shortly before 0100, c/o anxiety, now an 8/10  (Uriostegui=17) Requested & received Ativan 1 mg  Po prn given at 0112  Good effect obtained, as observed asleep in bed within 50 minutes  Will continue to monitor

## 2019-09-05 NOTE — PROGRESS NOTES
Patient seen an examined  No change in health since last admission in August  No recent illness  Medically stable for inpatient psychiatric evaluation and treatment      Gen: AAOx3 pleasant  Heart: RRR S1S2 without murmur, rub, gallop  Lungs: Clear to auscultation without crackles, wheezes, or rhonchi  Abdomen: soft without tenderness

## 2019-09-05 NOTE — PROGRESS NOTES
Awakened at approx  0530 to void, but able to resume sleep within 30 mins  Will continue to monitor

## 2019-09-05 NOTE — PROGRESS NOTES
Requested & received Trazodone 50 mg po prn/sleep & atarax 50 mg p/o prn /anxiety of 7/10 (Halmiton=12) Good effect obtained, as observed asleep in bed within the hr  Will continue to monitor

## 2019-09-06 LAB — RPR SER QL: NORMAL

## 2019-09-06 PROCEDURE — 99232 SBSQ HOSP IP/OBS MODERATE 35: CPT | Performed by: PSYCHIATRY & NEUROLOGY

## 2019-09-06 RX ORDER — TRAZODONE HYDROCHLORIDE 100 MG/1
100 TABLET ORAL
Status: DISCONTINUED | OUTPATIENT
Start: 2019-09-06 | End: 2019-09-13 | Stop reason: HOSPADM

## 2019-09-06 RX ADMIN — PRAZOSIN HYDROCHLORIDE 1 MG: 1 CAPSULE ORAL at 23:01

## 2019-09-06 RX ADMIN — LITHIUM CARBONATE 450 MG: 450 TABLET, EXTENDED RELEASE ORAL at 09:33

## 2019-09-06 RX ADMIN — LITHIUM CARBONATE 450 MG: 450 TABLET, EXTENDED RELEASE ORAL at 23:01

## 2019-09-06 RX ADMIN — GABAPENTIN 600 MG: 300 CAPSULE ORAL at 09:33

## 2019-09-06 RX ADMIN — DULOXETINE 60 MG: 30 CAPSULE, DELAYED RELEASE ORAL at 09:33

## 2019-09-06 RX ADMIN — GABAPENTIN 600 MG: 300 CAPSULE ORAL at 17:05

## 2019-09-06 RX ADMIN — GABAPENTIN 600 MG: 300 CAPSULE ORAL at 23:01

## 2019-09-06 RX ADMIN — LORAZEPAM 1 MG: 1 TABLET ORAL at 00:51

## 2019-09-06 RX ADMIN — TRAZODONE HYDROCHLORIDE 100 MG: 100 TABLET ORAL at 23:01

## 2019-09-06 NOTE — PROGRESS NOTES
Was given Trazodone 50 mg po prn at 2240, with good effect from 2330 until 0030, when out of bed c/o anxiety  Of 6-7 out of 10  (Uriostegui=15) Given Ativan 1 mg po prn at 0051, with good effect obtained within the hr  As observed asleep in bed  Remains asleep presently
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
## 2019-09-06 NOTE — PROGRESS NOTES
Pt is pleasant and cooperative during interaction  Depressed affect, reports minor improvement of symptoms since admission  States it is helpful to have the distraction of groups and interactions on unit  Reports difficulty sleeping overnight  Consenting for safety on unit

## 2019-09-06 NOTE — PROGRESS NOTES
09/06/19 0727   Team Meeting   Meeting Type Daily Rounds   Team Members Present   Team Members Present Physician;Nurse;;Occupational Therapist   Physician Team Member Dr Yasmany Noe Team Member MAXIMILIAN RUST Management Team Member Jose Rafael/ Luis Lowe   OT Team Member Leidy   Patient/Family Present   Patient Present No   Patient's Family Present No     Denying SI  Received Atarax yesterday evening for anxiety  Pleasant and social  Had difficulty sleeping and received Ativan  Likely mid next week  Homeless

## 2019-09-06 NOTE — PROGRESS NOTES
Progress Note - Behavioral Health   Christian De Dios 38 y o  female MRN: [id number]  Unit/Bed#: U 258-02 Encounter: [id number]    Assessment/Plan   Principal Problem:    Bipolar II disorder ([address] )  Active Problems:    HEAVEN (generalized anxiety disorder)    Subjective:  Patient is compliant with medications with no acute side effects  Patient is showing slow improvement in mood and anxiety with reduction in passive death wishes  Patient is tolerating current dosage of medication with no acute side effects  Patient does report persistence of insomnia and her requiring additional dosage of trazodone for sleep with good response  She agreed with plan adding 100 mg of trazodone tonight and monitoring for vitals tomorrow morning  She reports taking 150 mg of trazodone in past but not sure why dosage was reduced during last admission  Discussed that this is likely from the prazosin and trazodone interaction risk in terms of low blood pressure in the morning time  Patient reports understanding and agreed with the treatment planning discussed below  She is seen out in milieu attending groups and is consenting for safety on the unit      Current Medications:    Current Facility-Administered Medications:  acetaminophen 325 mg Oral Q6H PRN Shilo May, PA-C   acetaminophen 650 mg Oral Q4H PRN Shilo May, PA-C   acetaminophen 975 mg Oral Q6H PRN Shilo May, PA-C   aluminum-magnesium hydroxide-simethicone 30 mL Oral Q4H PRN Shilo May, PA-C   benztropine 1 mg Intramuscular Q6H PRN Shilo May, PA-C   benztropine 1 mg Oral Q6H PRN Shilo May, PA-C   DULoxetine 60 mg Oral Daily Shilo May, PA-C   gabapentin 600 mg Oral TID Kev Oh   haloperidol 5 mg Oral Q6H PRN Shilo May, PA-C   haloperidol lactate 5 mg Intramuscular Q6H PRN Shilo May, PA-C   hydrOXYzine HCL 25 mg Oral Q6H PRN Shilo May, PA-C   hydrOXYzine HCL 50 mg Oral Q6H PRN Shilo May, PA-ANA   lithium carbonate 450 mg Oral Q12H [address] Marva lOiver, PA-ANA   LORazepam 2 mg Intramuscular Q6H PRN Marva Oliver, PA-ANA   LORazepam 1 mg Oral Q6H PRN Marva Oliver, PA-ANA   magnesium hydroxide 30 mL Oral Daily PRN Marva Oliver, PA-ANA   nicotine polacrilex 2 mg Oral Q2H PRN Marva Oliver, PA-ANA   OLANZapine 10 mg Intramuscular Q8H PRN Marva Oliver, PA-C   OLANZapine 10 mg Oral Q8H PRN Marva Oliver, PA-ANA   prazosin 1 mg Oral HS [facility]   risperiDONE 1 mg Oral Q3H PRN Marva Oliver, PA-ANA   traZODone 100 mg Oral HS [facility]   traZODone 50 mg Oral HS PRN Marva Oliver, RAMONE       Behavioral Health Medications: all current active meds have been reviewed  Vital signs in last 24 hours:  Temp:  [97 °F (36 1 °C)-97 7 °F (36 5 °C)] 97 °F (36 1 °C)  HR:  [56-68] 56  Resp:  [16-18] 16  BP: (109-131)/(56-74) 109/56    Laboratory results:    I have personally reviewed all pertinent laboratory/tests results    Labs in last 72 hours:   Recent Labs     09/03/19  1432 09/03/19  1627 09/05/19  0519   WBC 14 70*  --  10 64*   RBC 4 79  --  4 37   HGB 15 0  --  13 4   HCT 44 5  --  41 1   *  --  495*   RDW 15 6*  --  14 0   NEUTROABS 7 64  --  4 83   SODIUM 145  --   --    K 4 1  --   --      --   --    CO2 23  --   --    BUN <2*  --   --    CREATININE 0 49*  --   --    GLUC 138*  --   --    CALCIUM 9 0  --   --    AST 35  --   --    ALT 45  --   --    ALKPHOS 93  --   --    TP 8 0  --   --    ALB 4 5  --   --    TBILI <0 10  --   --    CHOLESTEROL  --   --  106   HDL  --   --  19*   TRIG  --   --  223*   LDLCALC  --   --  42   LITHIUM  --  <0 2*  --    PREGSERUM  --   --  Negative   RPR  --   --  Non-Reactive     Admission Labs:   Admission on 09/04/2019   Component Date Value    WBC 09/05/2019 10 64*    RBC 09/05/2019 4 37     Hemoglobin 09/05/2019 13 4     Hematocrit 09/05/2019 41 1     MCV 09/05/2019 94     MCH 09/05/2019 30 7     MCHC 09/05/2019 32 6     RDW 09/05/2019 14 0     MPV 09/05/2019 8 9     Platelets 83/42/2059 495*    nRBC 09/05/2019 0     Neutrophils Relative 09/05/2019 46     Immat GRANS % 09/05/2019 0     Lymphocytes Relative 09/05/2019 41     Monocytes Relative 09/05/2019 6     Eosinophils Relative 09/05/2019 6     Basophils Relative 09/05/2019 1     Neutrophils Absolute 09/05/2019 4 83     Immature Grans Absolute 09/05/2019 0 03     Lymphocytes Absolute 09/05/2019 4 41     Monocytes Absolute 09/05/2019 0 65     Eosinophils Absolute 09/05/2019 0 65*    Basophils Absolute 09/05/2019 0 07     Preg, Serum 09/05/2019 Negative     Cholesterol 09/05/2019 106     Triglycerides 09/05/2019 223*    HDL, Direct 09/05/2019 19*    LDL Calculated 09/05/2019 42     Non-HDL-Chol (CHOL-HDL) 09/05/2019 87     RPR 09/05/2019 Non-Reactive     Hemoglobin A1C 09/05/2019 5 0     EAG 09/05/2019 97        Psychiatric Review of Systems:  Behavior over the last 24 hours:  Slow improvement  Sleep: normal  Appetite: normal  Medication side effects: No  ROS: no complaints    Mental Status Evaluation:  Appearance:  casually dressed   Behavior:  guarded   Speech:  loud   Mood:  anxious and depressed   Affect:  increased in range   Language naming objects   Thought Process:  circumstantial   Thought Content:  obsessions   Perceptual Disturbances: None   Risk Potential: Suicidal Ideations without plan, Homicidal Ideations none and Potential for Aggression No   Sensorium:  person and place   Cognition:  grossly intact   Consciousness:  awake    Attention: attention span appeared shorter than expected for age   Insight:  limited   Judgment: limited   Intellect fair   Gait/Station: normal gait/station   Motor Activity: no abnormal movements     Memory: Short and long term memory  fair     Progress Toward Goals: slow progress    Recommended Treatment:   Continue Cymbalta 60 mg daily for depression management  Continue lithium  mg b i d   For mood stabilization  Check lithium level on Monday and plan titration accordingly if indicated  Continue prazosin 1 mg at bedtime for intrusive nightmares management  Add trazodone 100 mg at HS for insomnia management  Continue gabapentin 600 mg t i d  For anxiety management  Continue with group therapy, milieu therapy and occupational therapy      Continue following current medications:   Current Facility-Administered Medications:  acetaminophen 325 mg Oral Q6H PRN Panfilo Magyar, PA-C   acetaminophen 650 mg Oral Q4H PRN Panfilo Magyar, PA-C   acetaminophen 975 mg Oral Q6H PRN Panfilo Magyar, PA-C   aluminum-magnesium hydroxide-simethicone 30 mL Oral Q4H PRN Panfilo Magyar, PA-C   benztropine 1 mg Intramuscular Q6H PRN Panfilo Magyar, PA-C   benztropine 1 mg Oral Q6H PRN Panfilo Magyar, PA-C   DULoxetine 60 mg Oral Daily Panfilo Magyar, PA-C   gabapentin 600 mg Oral TID [facility]   haloperidol 5 mg Oral Q6H PRN Panfilo Magyar, PA-C   haloperidol lactate 5 mg Intramuscular Q6H PRN Panfilo Magyar, PA-C   hydrOXYzine HCL 25 mg Oral Q6H PRN Panfilo Magyar, PA-C   hydrOXYzine HCL 50 mg Oral Q6H PRN Panfilo Magyar, PA-C   lithium carbonate 450 mg Oral Q12H [address] Panfilo Magyar, PA-C   LORazepam 2 mg Intramuscular Q6H PRN Panfilo Magyar, PA-C   LORazepam 1 mg Oral Q6H PRN Panfilo Magyar, PA-C   magnesium hydroxide 30 mL Oral Daily PRN Panfilo Magyar, PA-C   nicotine polacrilex 2 mg Oral Q2H PRN Panfilo Magyar, PA-C   OLANZapine 10 mg Intramuscular Q8H PRN Panfilo Magyar, PA-C   OLANZapine 10 mg Oral Q8H PRN Panfilo Magyar, PA-C   prazosin 1 mg Oral HS [facility]   risperiDONE 1 mg Oral Q3H PRN Panfilo Magyar, PA-C   traZODone 100 mg Oral HS [facility]   traZODone 50 mg Oral HS PRN Panfilo Ajyar, PA-C       Risks, benefits and possible side effects of Medications:   Risks, benefits, and possible side effects of medications explained to patient and patient verbalizes understanding  Risks of medications in pregnancy explained if female patient  Patient verbalizes understanding and agrees to notify her doctor if she becomes pregnant  This note has been constructed using a voice recognition system  There may be translation, syntax,  or grammatical errors  If you have any questions, please contact the dictating provider

## 2019-09-06 NOTE — PLAN OF CARE
Problem: Ineffective Coping  Goal: Participates in unit activities  Description  Interventions:  - Provide therapeutic environment   - Provide required programming   - Redirect inappropriate behaviors   Outcome: Progressing  Goal: Participates in unit activities  Description  Interventions:  - Provide therapeutic environment   - Provide required programming   - Redirect inappropriate behaviors   Outcome: Progressing     Problem: Depression  Goal: Verbalize thoughts and feelings  Description  Interventions:  - Assess and re-assess patient's level of risk   - Engage patient in 1:1 interactions, daily, for a minimum of 15 minutes   - Encourage patient to express feelings, fears, frustrations, hopes   Outcome: Progressing  Goal: Refrain from isolation  Description  Interventions:  - Develop a trusting relationship   - Encourage socialization   Outcome: Progressing     Problem: Anxiety  Goal: Anxiety is at manageable level  Description  Interventions:  - Assess and monitor patient's anxiety level  - Monitor for signs and symptoms (heart palpitations, chest pain, shortness of breath, headaches, nausea, feeling jumpy, restlessness, irritable, apprehensive)  - Collaborate with interdisciplinary team and initiate plan and interventions as ordered    - [place] patient to unit/surroundings  - Explain treatment plan  - Encourage participation in care  - Encourage verbalization of concerns/fears  - Identify coping mechanisms  - Assist in developing anxiety-reducing skills  - Administer/offer alternative therapies  - Limit or eliminate stimulants  Outcome: Progressing     Problem: SELF HARM/SUICIDALITY  Goal: Will have no self-injury during hospital stay  Description  INTERVENTIONS:  - Q 15 MINUTES: Routine safety checks  - Q WAKING SHIFT & PRN: Assess risk to determine if routine checks are adequate to maintain patient safety  - Encourage patient to participate actively in care by formulating a plan to combat response to suicidal ideation, identify supports and resources  Outcome: Progressing     Problem: ANXIETY  Goal: Will report anxiety at manageable levels  Description  INTERVENTIONS:  - Administer medication as ordered  - Teach and encourage coping skills  - Provide emotional support  - Assess patient/family for anxiety and ability to cope  Outcome: Progressing  Goal: By discharge: Patient will verbalize 2 strategies to deal with anxiety  Description  Interventions:  - Identify any obvious source/trigger to anxiety  - Staff will assist patient in applying identified coping technique/skills  - Encourage attendance of scheduled groups and activities  Outcome: Progressing

## 2019-09-06 NOTE — PROGRESS NOTES
Pt   Given Atarax  50  Mg  Po  For  Anxiety  At  1928  Anxiety  Score  22  Med  Effective  Pt   Resting  In bed   At  Present

## 2019-09-07 PROCEDURE — 99232 SBSQ HOSP IP/OBS MODERATE 35: CPT | Performed by: STUDENT IN AN ORGANIZED HEALTH CARE EDUCATION/TRAINING PROGRAM

## 2019-09-07 RX ADMIN — GABAPENTIN 600 MG: 300 CAPSULE ORAL at 08:28

## 2019-09-07 RX ADMIN — GABAPENTIN 600 MG: 300 CAPSULE ORAL at 21:52

## 2019-09-07 RX ADMIN — LITHIUM CARBONATE 450 MG: 450 TABLET, EXTENDED RELEASE ORAL at 21:52

## 2019-09-07 RX ADMIN — LORAZEPAM 1 MG: 1 TABLET ORAL at 10:29

## 2019-09-07 RX ADMIN — DULOXETINE 60 MG: 30 CAPSULE, DELAYED RELEASE ORAL at 08:28

## 2019-09-07 RX ADMIN — TRAZODONE HYDROCHLORIDE 100 MG: 100 TABLET ORAL at 21:52

## 2019-09-07 RX ADMIN — PRAZOSIN HYDROCHLORIDE 1 MG: 1 CAPSULE ORAL at 21:52

## 2019-09-07 RX ADMIN — LITHIUM CARBONATE 450 MG: 450 TABLET, EXTENDED RELEASE ORAL at 08:28

## 2019-09-07 RX ADMIN — GABAPENTIN 600 MG: 300 CAPSULE ORAL at 16:18

## 2019-09-07 NOTE — PROGRESS NOTES
Pt pleasant during interaction, coloring in the day room  Pt has a depressed affect, but brightens during conversation  Visible in the milieu throughout the shift, social with select peers  Attends and participates in groups  Denies SI/HI/AVH  Medication compliant

## 2019-09-07 NOTE — PROGRESS NOTES
Pt reports fleeting SI but contracts for safety  Reports being able to "push out" the negative thoughts by staying active on the unit  Says she hasn't written any positive affirmations due to having difficulty because she doesn't want to write them if she doesn't believe them  Pt will ask writer if she would like assistance with completing the task

## 2019-09-07 NOTE — PLAN OF CARE
Problem: Ineffective Coping  Goal: Participates in unit activities  Description  Interventions:  - Provide therapeutic environment   - Provide required programming   - Redirect inappropriate behaviors   Outcome: Progressing  Goal: Participates in unit activities  Description  Interventions:  - Provide therapeutic environment   - Provide required programming   - Redirect inappropriate behaviors   Outcome: Progressing     Problem: Depression  Goal: Verbalize thoughts and feelings  Description  Interventions:  - Assess and re-assess patient's level of risk   - Engage patient in 1:1 interactions, daily, for a minimum of 15 minutes   - Encourage patient to express feelings, fears, frustrations, hopes   Outcome: Progressing  Goal: Refrain from isolation  Description  Interventions:  - Develop a trusting relationship   - Encourage socialization   Outcome: Progressing     Problem: Anxiety  Goal: Anxiety is at manageable level  Description  Interventions:  - Assess and monitor patient's anxiety level  - Monitor for signs and symptoms (heart palpitations, chest pain, shortness of breath, headaches, nausea, feeling jumpy, restlessness, irritable, apprehensive)  - Collaborate with interdisciplinary team and initiate plan and interventions as ordered    - [place] patient to unit/surroundings  - Explain treatment plan  - Encourage participation in care  - Encourage verbalization of concerns/fears  - Identify coping mechanisms  - Assist in developing anxiety-reducing skills  - Administer/offer alternative therapies  - Limit or eliminate stimulants  Outcome: Progressing     Problem: DISCHARGE PLANNING  Goal: Discharge to home or other facility with appropriate resources  Description  INTERVENTIONS:  - Identify barriers to discharge w/patient and caregiver  - Arrange for needed discharge resources and transportation as appropriate  - Identify discharge learning needs (meds, wound care, etc )  - Arrange for interpretive services to assist at discharge as needed  - Refer to Case Management Department for coordinating discharge planning if the patient needs post-hospital services based on physician/advanced practitioner order or complex needs related to functional status, cognitive ability, or social support system  Outcome: Progressing     Problem: SELF HARM/SUICIDALITY  Goal: Will have no self-injury during hospital stay  Description  INTERVENTIONS:  - Q 15 MINUTES: Routine safety checks  - Q WAKING SHIFT & PRN: Assess risk to determine if routine checks are adequate to maintain patient safety  - Encourage patient to participate actively in care by formulating a plan to combat response to suicidal ideation, identify supports and resources  Outcome: Progressing     Problem: ANXIETY  Goal: Will report anxiety at manageable levels  Description  INTERVENTIONS:  - Administer medication as ordered  - Teach and encourage coping skills  - Provide emotional support  - Assess patient/family for anxiety and ability to cope  Outcome: Progressing  Goal: By discharge: Patient will verbalize 2 strategies to deal with anxiety  Description  Interventions:  - Identify any obvious source/trigger to anxiety  - Staff will assist patient in applying identified coping technique/skills  - Encourage attendance of scheduled groups and activities  Outcome: Progressing

## 2019-09-07 NOTE — PROGRESS NOTES
Progress Note - Behavioral Health   Shashank Lares 38 y o  female MRN: [id number]  Unit/Bed#: Noemi Swart[facility] 173-80 Encounter: [id number]    Subjective:    Per nursing, patient appeared to sleep through the night, has fluctuating anxiety, pleasant and cooperative  Per patient, patient reports that her anxiety goes up and down, her suicidal thoughts go up and down  She reports trying to push through her thoughts, reports that Ativan helps her anxiety symptoms  She reports going through a lot of stressors that she doesn't want to talk about  Patient reports that she slept okay last night, woke up a few times during the night  She reports that Prazosin has been helping with her sleep  She reports appetite has been okay  Patient endorses active suicidal ideation, denies any intent or plan on acting the thoughts, reports that she feels safe in the hospital   Patient denies any auditory or visual hallucinations        Behavior over the last 24 hours:  unchanged  Medication side effects: No  ROS: no complaints    Objective:    Temp:  [96 9 °F (36 1 °C)-97 °F (36 1 °C)] 96 9 °F (36 1 °C)  HR:  [56-74] 74  Resp:  [16-17] 16  BP: ()/(53-67) 122/62    Mental Status Evaluation:  Appearance:  sitting comfortably in chair, dressed in casual clothing, cooperative with interview, fairly well related   Behavior:  No tics, tremors, or behaviors observed   Speech:  Normal rate, rhythm, and volume   Mood:  "Up and down"   Affect:  Appears constricted in depressed range, stable, mood-congruent   Thought Process:  Linear and goal directed   Associations intact associations   Thought Content:  Fleeting active suicidal ideation, no intent or plan   Perceptual Disturbances: Denies any auditory or visual hallucinations   Sensorium:  Oriented to person, place, time, and situation   Memory:  recent and remote memory grossly intact   Consciousness:  alert and awake   Attention: attention span and concentration were age appropriate Insight:  Limited   Judgment: limited   Gait/Station: normal gait/station   Motor Activity: no abnormal movements       Labs: I have personally reviewed all pertinent laboratory/tests results  Labs in last 72 hours:   Recent Labs     09/05/19  0519   WBC 10 64*   RBC 4 37   HGB 13 4   HCT 41 1   *   RDW 14 0   NEUTROABS 4 83   CHOLESTEROL 106   HDL 19*   TRIG 223*   LDLCALC 42   PREGSERUM Negative   RPR Non-Reactive       Progress Toward Goals: Progressing    Recommended Treatment: Continue with group therapy, milieu therapy and occupational therapy  Risks, benefits and possible side effects of Medications:   Risks, benefits, and possible side effects of medications explained to patient and patient verbalizes understanding  Medications: all current active meds have been reviewed      Current Facility-Administered Medications:  acetaminophen 325 mg Oral Q6H PRN Patti Guillory PA-C   acetaminophen 650 mg Oral Q4H PRN Patti Guillory, PA-C   acetaminophen 975 mg Oral Q6H PRN Patti Guillory, PA-C   aluminum-magnesium hydroxide-simethicone 30 mL Oral Q4H PRN Patti Guillory PA-C   benztropine 1 mg Intramuscular Q6H PRN Patti Guillory PA-C   benztropine 1 mg Oral Q6H PRN Patti Guillory, PA-C   DULoxetine 60 mg Oral Daily Patti Guillory PA-C   gabapentin 600 mg Oral TID Kev Oh   haloperidol 5 mg Oral Q6H PRN Patti Guillory, PA-C   haloperidol lactate 5 mg Intramuscular Q6H PRN Patti Guillory, PA-C   hydrOXYzine HCL 25 mg Oral Q6H PRN Patti Guillory, PA-C   hydrOXYzine HCL 50 mg Oral Q6H PRN Patti Guillory PA-C   lithium carbonate 450 mg Oral Q12H [address] Patti Guillory PA-C   LORazepam 2 mg Intramuscular Q6H PRN Patti Guillory, PA-C   LORazepam 1 mg Oral Q6H PRN Patti Guillory, PA-C   magnesium hydroxide 30 mL Oral Daily PRN Patti Guillory PA-C   nicotine polacrilex 2 mg Oral Q2H PRN Patti Guillory, PA-C   OLANZapine 10 mg Intramuscular Q8H PRN Baldo Dart, RAMONE   OLANZapine 10 mg Oral Q8H PRN Baldo Dart, PA-ANA   prazosin 1 mg Oral HS Kev Oh   risperiDONE 1 mg Oral Q3H PRN Baldo Dart, PA-ANA   traZODone 100 mg Oral HS Kev Oh   traZODone 50 mg Oral HS PRN Baldo Dart, RAMONE           Assessment/Plan   Principal Problem:    Bipolar II disorder (HCC)  Active Problems:    HEAVEN (generalized anxiety disorder)    37 y/o Female with bipolar II d/o, HEAVEN- continues to have depressive symptoms, fleeting active SI but no intent or plan, feeling anxious at times  Plan:  -Continue current med regimen   -Will check serum Lithium level on Monday and titrate accordingly
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
## 2019-09-08 PROCEDURE — 99232 SBSQ HOSP IP/OBS MODERATE 35: CPT | Performed by: STUDENT IN AN ORGANIZED HEALTH CARE EDUCATION/TRAINING PROGRAM

## 2019-09-08 RX ORDER — PRAZOSIN HYDROCHLORIDE 1 MG/1
2 CAPSULE ORAL
Status: DISCONTINUED | OUTPATIENT
Start: 2019-09-08 | End: 2019-09-13 | Stop reason: HOSPADM

## 2019-09-08 RX ADMIN — LITHIUM CARBONATE 450 MG: 450 TABLET, EXTENDED RELEASE ORAL at 09:56

## 2019-09-08 RX ADMIN — GABAPENTIN 600 MG: 300 CAPSULE ORAL at 22:00

## 2019-09-08 RX ADMIN — GABAPENTIN 600 MG: 300 CAPSULE ORAL at 09:56

## 2019-09-08 RX ADMIN — TRAZODONE HYDROCHLORIDE 100 MG: 100 TABLET ORAL at 22:02

## 2019-09-08 RX ADMIN — PRAZOSIN HYDROCHLORIDE 2 MG: 1 CAPSULE ORAL at 22:02

## 2019-09-08 RX ADMIN — GABAPENTIN 600 MG: 300 CAPSULE ORAL at 17:00

## 2019-09-08 RX ADMIN — DULOXETINE 60 MG: 30 CAPSULE, DELAYED RELEASE ORAL at 09:56

## 2019-09-08 RX ADMIN — LITHIUM CARBONATE 450 MG: 450 TABLET, EXTENDED RELEASE ORAL at 22:00

## 2019-09-08 NOTE — PROGRESS NOTES
Progress Note - Behavioral Health   Yadira Lemus 38 y o  female MRN: [id number]  Unit/Bed#: [facility] 258-02 Encounter: [id number]    Subjective:    Per nursing, patient with fleeting SI yesterday afternoon, able to push through thoughts, slept throughout the night, tearful this morning regarding living situation  Per patient, patient reports that she has been enjoying doing the artwork  She reports that the Trazodone and Minipress are helping her to get to sleep, reports that she has been waking up at 4 AM with nightmares  She reports trying not to use an Ativan  Patient denies any feelings of dizziness  Patient reports her mood has been in a "foul mood, okay after the morning "  She reports appetite is okay  Patient endorses fleeting passive suicidal ideation this morning, fleeting thoughts of hopelessness this morning  Patient reports that she is help-seeking  Patient denies significant anxiety or worries  Patient reports hoping to look for mental health housing        Behavior over the last 24 hours:  improved  Medication side effects: No  ROS: no complaints    Objective:    Temp:  [97 °F (36 1 °C)-97 2 °F (36 2 °C)] 97 °F (36 1 °C)  HR:  [55-69] 69  Resp:  [17-69] 69  BP: ()/(56-68) 99/58    Mental Status Evaluation:  Appearance:  sitting comfortably in chair, dressed in casual clothing, cooperative with interview, fairly well related   Behavior:  No tics, tremors, or behaviors observed   Speech:  Normal rate, rhythm, and volume   Mood:  "Foul mood, okay after this morning"   Affect:  Appears mildly constricted in depressed range, stable, mood-congruent   Thought Process:  Linear and goal directed   Associations intact associations   Thought Content:  Fleeting passive suicidal ideation and No active suicidal ideation, intent, or plan   Perceptual Disturbances: Denies any auditory or visual hallucinations   Sensorium:  Oriented to person, place, time, and situation   Memory:  recent and remote memory grossly intact   Consciousness:  alert and awake   Attention: attention span and concentration were age appropriate   Insight:  Limited   Judgment: limited   Gait/Station: normal gait/station   Motor Activity: no abnormal movements       Progress Toward Goals: Progressing    Recommended Treatment: Continue with group therapy, milieu therapy and occupational therapy  Risks, benefits and possible side effects of Medications:   Risks, benefits, and possible side effects of medications explained to patient and patient verbalizes understanding  Medications: all current active meds have been reviewed      Current Facility-Administered Medications:  acetaminophen 325 mg Oral Q6H PRN Stan Starcher, PA-C   acetaminophen 650 mg Oral Q4H PRN Stan Starcher, PA-C   acetaminophen 975 mg Oral Q6H PRN Stan Starcher, PA-C   aluminum-magnesium hydroxide-simethicone 30 mL Oral Q4H PRN Stan Starcher, PA-C   benztropine 1 mg Intramuscular Q6H PRN Stan Starcher, PA-C   benztropine 1 mg Oral Q6H PRN Stan Starcher, PA-C   DULoxetine 60 mg Oral Daily Stan Starcher, PA-C   gabapentin 600 mg Oral TID [facility]   haloperidol 5 mg Oral Q6H PRN Stan Starcher, PA-C   haloperidol lactate 5 mg Intramuscular Q6H PRN Stan Starcher, PA-C   hydrOXYzine HCL 25 mg Oral Q6H PRN Stan Starcher, PA-C   hydrOXYzine HCL 50 mg Oral Q6H PRN Stan Starcher, PA-C   lithium carbonate 450 mg Oral Q12H [facility] Stan Starcher, PA-C   LORazepam 2 mg Intramuscular Q6H PRN Stan Starcher, PA-C   LORazepam 1 mg Oral Q6H PRN Stan Starcher, PA-C   magnesium hydroxide 30 mL Oral Daily PRN Stan Starcher, PA-C   nicotine polacrilex 2 mg Oral Q2H PRN Stan Starcher, PA-C   OLANZapine 10 mg Intramuscular Q8H PRN Stan Starcher, PA-C   OLANZapine 10 mg Oral Q8H PRN Stan Starcher, PA-C   prazosin 1 mg Oral HS [facility]   risperiDONE 1 mg Oral Q3H PRN Stan Starcher, PA-C   traZODone 100 mg Oral KATELYN Oh   traZODone 50 mg Oral HS PRN Craig Medrano PA-C       Assessment/Plan   Principal Problem:    Bipolar II disorder (HCC)  Active Problems:    HEAVEN (generalized anxiety disorder)    39 y/o Female with bipolar II d/o, HEAVEN- continues to have difficulty sleeping with nightmares, fleeting passive SI, tearful at times  Plan:  -Will titrate Prazosin to 2 mg qhs for PTSD-related nightmares   -Continue rest of med regimen  -CW to work on [employer]
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
## 2019-09-08 NOTE — PLAN OF CARE
Problem: Ineffective Coping  Goal: Participates in unit activities  Description  Interventions:  - Provide therapeutic environment   - Provide required programming   - Redirect inappropriate behaviors   Outcome: Progressing     Problem: Depression  Goal: Verbalize thoughts and feelings  Description  Interventions:  - Assess and re-assess patient's level of risk   - Engage patient in 1:1 interactions, daily, for a minimum of 15 minutes   - Encourage patient to express feelings, fears, frustrations, hopes   Outcome: Progressing  Goal: Refrain from isolation  Description  Interventions:  - Develop a trusting relationship   - Encourage socialization   Outcome: Progressing     Problem: Anxiety  Goal: Anxiety is at manageable level  Description  Interventions:  - Assess and monitor patient's anxiety level  - Monitor for signs and symptoms (heart palpitations, chest pain, shortness of breath, headaches, nausea, feeling jumpy, restlessness, irritable, apprehensive)  - Collaborate with interdisciplinary team and initiate plan and interventions as ordered    - Melrude patient to unit/surroundings  - Explain treatment plan  - Encourage participation in care  - Encourage verbalization of concerns/fears  - Identify coping mechanisms  - Assist in developing anxiety-reducing skills  - Administer/offer alternative therapies  - Limit or eliminate stimulants  Outcome: Progressing     Problem: DISCHARGE PLANNING  Goal: Discharge to home or other facility with appropriate resources  Description  INTERVENTIONS:  - Identify barriers to discharge w/patient and caregiver  - Arrange for needed discharge resources and transportation as appropriate  - Identify discharge learning needs (meds, wound care, etc )  - Arrange for interpretive services to assist at discharge as needed  - Refer to Case Management Department for coordinating discharge planning if the patient needs post-hospital services based on physician/advanced practitioner order or complex needs related to functional status, cognitive ability, or social support system  Outcome: Progressing     Problem: SELF HARM/SUICIDALITY  Goal: Will have no self-injury during hospital stay  Description  INTERVENTIONS:  - Q 15 MINUTES: Routine safety checks  - Q WAKING SHIFT & PRN: Assess risk to determine if routine checks are adequate to maintain patient safety  - Encourage patient to participate actively in care by formulating a plan to combat response to suicidal ideation, identify supports and resources  Outcome: Progressing     Problem: ANXIETY  Goal: Will report anxiety at manageable levels  Description  INTERVENTIONS:  - Administer medication as ordered  - Teach and encourage coping skills  - Provide emotional support  - Assess patient/family for anxiety and ability to cope  Outcome: Progressing  Goal: By discharge: Patient will verbalize 2 strategies to deal with anxiety  Description  Interventions:  - Identify any obvious source/trigger to anxiety  - Staff will assist patient in applying identified coping technique/skills  - Encourage attendance of scheduled groups and activities  Outcome: Progressing

## 2019-09-08 NOTE — PROGRESS NOTES
Daily Rounds:    Pt tearful, hopeless yesterday  Reports SI, contracts for safety  Pt trying to stay busy on unit  Social with peers  Visible on unit throughout evening, slept well

## 2019-09-09 LAB
ALBUMIN SERPL BCP-MCNC: 3.1 G/DL (ref 3.5–5)
ALP SERPL-CCNC: 76 U/L (ref 46–116)
ALT SERPL W P-5'-P-CCNC: 53 U/L (ref 12–78)
ANION GAP SERPL CALCULATED.3IONS-SCNC: 5 MMOL/L (ref 4–13)
AST SERPL W P-5'-P-CCNC: 23 U/L (ref 5–45)
BILIRUB SERPL-MCNC: 0.5 MG/DL (ref 0.2–1)
BUN SERPL-MCNC: 18 MG/DL (ref 5–25)
CALCIUM SERPL-MCNC: 9.1 MG/DL (ref 8.3–10.1)
CHLORIDE SERPL-SCNC: 106 MMOL/L (ref 100–108)
CO2 SERPL-SCNC: 28 MMOL/L (ref 21–32)
CREAT SERPL-MCNC: 0.77 MG/DL (ref 0.6–1.3)
GFR SERPL CREATININE-BSD FRML MDRD: 95 ML/MIN/1.73SQ M
GLUCOSE SERPL-MCNC: 101 MG/DL (ref 65–140)
LITHIUM SERPL-SCNC: 1 MMOL/L (ref 0.5–1)
POTASSIUM SERPL-SCNC: 4.2 MMOL/L (ref 3.5–5.3)
PROT SERPL-MCNC: 6.7 G/DL (ref 6.4–8.2)
SODIUM SERPL-SCNC: 139 MMOL/L (ref 136–145)

## 2019-09-09 PROCEDURE — 80178 ASSAY OF LITHIUM: CPT | Performed by: PSYCHIATRY & NEUROLOGY

## 2019-09-09 PROCEDURE — 80053 COMPREHEN METABOLIC PANEL: CPT | Performed by: PSYCHIATRY & NEUROLOGY

## 2019-09-09 PROCEDURE — 99232 SBSQ HOSP IP/OBS MODERATE 35: CPT | Performed by: PSYCHIATRY & NEUROLOGY

## 2019-09-09 RX ORDER — ARIPIPRAZOLE 2 MG/1
2 TABLET ORAL
Status: DISCONTINUED | OUTPATIENT
Start: 2019-09-09 | End: 2019-09-11

## 2019-09-09 RX ADMIN — PRAZOSIN HYDROCHLORIDE 2 MG: 1 CAPSULE ORAL at 23:01

## 2019-09-09 RX ADMIN — NICOTINE POLACRILEX 2 MG: 2 GUM, CHEWING BUCCAL at 12:39

## 2019-09-09 RX ADMIN — TRAZODONE HYDROCHLORIDE 100 MG: 100 TABLET ORAL at 23:02

## 2019-09-09 RX ADMIN — LITHIUM CARBONATE 450 MG: 450 TABLET, EXTENDED RELEASE ORAL at 08:09

## 2019-09-09 RX ADMIN — ARIPIPRAZOLE 2 MG: 2 TABLET ORAL at 23:03

## 2019-09-09 RX ADMIN — GABAPENTIN 600 MG: 300 CAPSULE ORAL at 08:09

## 2019-09-09 RX ADMIN — LITHIUM CARBONATE 450 MG: 450 TABLET, EXTENDED RELEASE ORAL at 23:02

## 2019-09-09 RX ADMIN — DULOXETINE 60 MG: 30 CAPSULE, DELAYED RELEASE ORAL at 08:09

## 2019-09-09 RX ADMIN — GABAPENTIN 600 MG: 300 CAPSULE ORAL at 15:45

## 2019-09-09 RX ADMIN — GABAPENTIN 600 MG: 300 CAPSULE ORAL at 23:01

## 2019-09-09 NOTE — PROGRESS NOTES
Progress Note - Behavioral Health   Marbella Mustafa 38 y o  female MRN: [id number]  Unit/Bed#: [facility] 258-02 Encounter: [id number]    Assessment/Plan   Principal Problem:    Bipolar II disorder ([address] )  Active Problems:    HEAVEN (generalized anxiety disorder)      Behavior over the last 24 hours:  unchanged  Sleep: normal  Appetite: normal  Medication side effects: No  ROS: feeling depressed and suicidal without a plan    Mental Status Evaluation:  Appearance:  age appropriate and casually dressed   Behavior:  cooperative   Speech:  normal pitch and normal volume   Mood:  constricted and depressed   Affect:  constricted   Thought Process:  goal directed and logical   Thought Content:  normal   Perceptual Disturbances: None   Risk Potential: Suicidal Ideations without plan, Homicidal Ideations none and Potential for Aggression No   Sensorium:  person, place and time/date   Cognition:  grossly intact   Consciousness:  alert and awake    Attention: attention span appeared shorter than expected for age   Insight:  limited   Judgment: limited   Gait/Station: normal gait/station and normal balance   Motor Activity: no abnormal movements     Progress Toward Goals: Patient is compliant with medications and denies side effects  She continues to reported depressed mood and fleeting SI without a plan  She is able to contract for safety while in this unit  She stated in the past she took Abilify and it helped her depression when combined with Cymbalta  Agree to start Abilify 2 mg qhs   Current lithium level is 1 0  Recommended Treatment: Continue with group therapy, milieu therapy and occupational therapy  Risks, benefits and possible side effects of Medications:   Risks, benefits, and possible side effects of medications explained to patient and patient verbalizes understanding        Medications:   all current active meds have been reviewed, continue current psychiatric medications and current meds:   Current Facility-Administered Medications   Medication Dose Route Frequency    acetaminophen (TYLENOL) tablet 325 mg  325 mg Oral Q6H PRN    acetaminophen (TYLENOL) tablet 650 mg  650 mg Oral Q4H PRN    acetaminophen (TYLENOL) tablet 975 mg  975 mg Oral Q6H PRN    aluminum-magnesium hydroxide-simethicone (MYLANTA) 200-200-20 mg/5 mL oral suspension 30 mL  30 mL Oral Q4H PRN    ARIPiprazole (ABILIFY) tablet 2 mg  2 mg Oral HS    benztropine (COGENTIN) injection 1 mg  1 mg Intramuscular Q6H PRN    benztropine (COGENTIN) tablet 1 mg  1 mg Oral Q6H PRN    DULoxetine (CYMBALTA) delayed release capsule 60 mg  60 mg Oral Daily    gabapentin (NEURONTIN) capsule 600 mg  600 mg Oral TID    haloperidol (HALDOL) tablet 5 mg  5 mg Oral Q6H PRN    haloperidol lactate (HALDOL) injection 5 mg  5 mg Intramuscular Q6H PRN    hydrOXYzine HCL (ATARAX) tablet 25 mg  25 mg Oral Q6H PRN    hydrOXYzine HCL (ATARAX) tablet 50 mg  50 mg Oral Q6H PRN    lithium carbonate (LITHOBID) CR tablet 450 mg  450 mg Oral Q12H [address]    LORazepam (ATIVAN) 2 mg/mL injection 2 mg  2 mg Intramuscular Q6H PRN    LORazepam (ATIVAN) tablet 1 mg  1 mg Oral Q6H PRN    magnesium hydroxide (MILK OF MAGNESIA) 400 mg/5 mL oral suspension 30 mL  30 mL Oral Daily PRN    nicotine polacrilex (NICORETTE) gum 2 mg  2 mg Oral Q2H PRN    OLANZapine (ZyPREXA) IM injection 10 mg  10 mg Intramuscular Q8H PRN    OLANZapine (ZyPREXA) tablet 10 mg  10 mg Oral Q8H PRN    prazosin (MINIPRESS) capsule 2 mg  2 mg Oral HS    risperiDONE (RisperDAL M-TABS) dispersible tablet 1 mg  1 mg Oral Q3H PRN    traZODone (DESYREL) tablet 100 mg  100 mg Oral HS    traZODone (DESYREL) tablet 50 mg  50 mg Oral HS PRN     Labs: reviewed    Counseling / Coordination of Care  Total floor / unit time spent today n/a minutes  Greater than 50% of total time was spent with the patient and / or family counseling and / or coordination of care   A description of the counseling / coordination of care:

## 2019-09-09 NOTE — PROGRESS NOTES
09/09/19 0748   Team Meeting   Meeting Type Daily Rounds   Team Members Present   Team Members Present Physician;Nurse;;Occupational Therapist   Physician Team Member Dr Laura Cm Team Member MAXIMILIAN [facility] Management Team Member Jose Rafael/ [address]   OT Team Member Leidy   Patient/Family Present   Patient Present No   Patient's Family Present No     201  Still passive death wish  Anxiety  Homeless  Li 1 0  Attends groups  Vitals are good

## 2019-09-09 NOTE — PLAN OF CARE
Problem: Depression  Goal: Refrain from isolation  Description  Interventions:  - Develop a trusting relationship   - Encourage socialization   9/9/2019 0022 by Ariana Kinsey RN  Outcome: Completed  9/8/2019 1949 by Ariana Kinsey RN  Outcome: Progressing

## 2019-09-09 NOTE — PLAN OF CARE
Problem: Depression  Goal: Verbalize thoughts and feelings  Description  Interventions:  - Assess and re-assess patient's level of risk   - Engage patient in 1:1 interactions, daily, for a minimum of 15 minutes   - Encourage patient to express feelings, fears, frustrations, hopes   Outcome: Progressing     Problem: Anxiety  Goal: Anxiety is at manageable level  Description  Interventions:  - Assess and monitor patient's anxiety level  - Monitor for signs and symptoms (heart palpitations, chest pain, shortness of breath, headaches, nausea, feeling jumpy, restlessness, irritable, apprehensive)  - Collaborate with interdisciplinary team and initiate plan and interventions as ordered    - Lena patient to unit/surroundings  - Explain treatment plan  - Encourage participation in care  - Encourage verbalization of concerns/fears  - Identify coping mechanisms  - Assist in developing anxiety-reducing skills  - Administer/offer alternative therapies  - Limit or eliminate stimulants  Outcome: Progressing     Problem: SELF HARM/SUICIDALITY  Goal: Will have no self-injury during hospital stay  Description  INTERVENTIONS:  - Q 15 MINUTES: Routine safety checks  - Q WAKING SHIFT & PRN: Assess risk to determine if routine checks are adequate to maintain patient safety  - Encourage patient to participate actively in care by formulating a plan to combat response to suicidal ideation, identify supports and resources  Outcome: Progressing

## 2019-09-09 NOTE — PROGRESS NOTES
Visible on unit  Friendly with several select male peers, but needing less redirection, as no inappropriate behavior observed  Denies SI/HI & A/V hallucinations  Rates depression at a 5 & anxiety at a 3-4  Tells writer that she awakened about 4 AM with "bad dream " Asked why she didn't inform staff at the time & stated, "well, I just tried to get back to sleep " Attended & participated group  Will continue to monitor

## 2019-09-09 NOTE — PROGRESS NOTES
Pt states she has fleeting depressing thoughts, but denies S/H/I and has contracted for safety  She shared that she felt triggered during group this AM and struggles finding full value with herself  She shared that yoga has been helpful  She also shared that taking Abilify and Cymbalta together have been effective for her and she would like to speak with MD about this  Pt is social and interactive with peers  Compliant with meds and attends groups  Will continue to monitor

## 2019-09-10 PROCEDURE — 99232 SBSQ HOSP IP/OBS MODERATE 35: CPT | Performed by: PSYCHIATRY & NEUROLOGY

## 2019-09-10 RX ADMIN — LITHIUM CARBONATE 450 MG: 450 TABLET, EXTENDED RELEASE ORAL at 22:13

## 2019-09-10 RX ADMIN — GABAPENTIN 600 MG: 300 CAPSULE ORAL at 16:25

## 2019-09-10 RX ADMIN — DULOXETINE 60 MG: 30 CAPSULE, DELAYED RELEASE ORAL at 09:12

## 2019-09-10 RX ADMIN — GABAPENTIN 600 MG: 300 CAPSULE ORAL at 09:12

## 2019-09-10 RX ADMIN — TRAZODONE HYDROCHLORIDE 100 MG: 100 TABLET ORAL at 22:13

## 2019-09-10 RX ADMIN — PRAZOSIN HYDROCHLORIDE 2 MG: 1 CAPSULE ORAL at 22:13

## 2019-09-10 RX ADMIN — LITHIUM CARBONATE 450 MG: 450 TABLET, EXTENDED RELEASE ORAL at 09:12

## 2019-09-10 RX ADMIN — ARIPIPRAZOLE 2 MG: 2 TABLET ORAL at 22:12

## 2019-09-10 RX ADMIN — GABAPENTIN 600 MG: 300 CAPSULE ORAL at 22:13

## 2019-09-10 NOTE — PROGRESS NOTES
Pt reports feeling depress upon waking this morning  Reports thoughts of SI with no plan  Contracts for safety  Pt has some hope that her day will improve  Plans to attend groups

## 2019-09-10 NOTE — PROGRESS NOTES
Pt appeared to have slept throughout the night without difficulty  Pt did wake early at 5am and has been reading since that time

## 2019-09-10 NOTE — PROGRESS NOTES
09/10/19 0744   Team Meeting   Meeting Type Daily Rounds   Team Members Present   Team Members Present Physician;Nurse;;Occupational Therapist   Physician Team Member Dr Duran  Team Member MAXIMILIAN [facility] Management Team Member Jose Rafael/ Donis Abreu   OT Team Member Leidy   Patient/Family Present   Patient Present No   Patient's Family Present No     Pt is denying SI  Social and very close with Oli Barbosa  Reports that group triggered her depression last night  Does not appear to be struggling much  Pt did not require any PRN's last night  Vitals are good  Expected dc mid to late next week  No dispensation planning due to homelessness

## 2019-09-10 NOTE — PROGRESS NOTES
Progress Note - Behavioral Health   Aleyda Reid 38 y o  female MRN: [id number]  Unit/Bed#: -02 Encounter: [id number]    Assessment/Plan   Principal Problem:    Bipolar II disorder ([address] )  Active Problems:    HEAVEN (generalized anxiety disorder)      Behavior over the last 24 hours:  unchanged  Sleep: normal  Appetite: normal  Medication side effects: No  ROS: depressed mood and SI without a plan    Mental Status Evaluation:  Appearance:  age appropriate and casually dressed   Behavior:  cooperative   Speech:  normal pitch and normal volume   Mood:  constricted and depressed   Affect:  constricted   Thought Process:  goal directed and logical   Thought Content:  normal   Perceptual Disturbances: None   Risk Potential: Suicidal Ideations without plan, Homicidal Ideations none and Potential for Aggression No   Sensorium:  person, place and time/date   Cognition:  grossly intact   Consciousness:  alert and awake    Attention: attention span appeared shorter than expected for age   Insight:  limited   Judgment: limited   Gait/Station: normal gait/station and normal balance   Motor Activity: no abnormal movements     Progress Toward Goals: Patient continues to report depressed mood and fleeting SI without a plan  Abilify was started last night and will increase to 5 mg tomorrow night  Continue to monitor for side effects  Patient is feeling anxious about discharge because she is currently homeless  She was upset with  because he has not found a place she could go to  Recommended Treatment: Continue with group therapy, milieu therapy and occupational therapy  Risks, benefits and possible side effects of Medications:   Risks, benefits, and possible side effects of medications explained to patient and patient verbalizes understanding        Medications:   all current active meds have been reviewed, continue current psychiatric medications and current meds:   Current Facility-Administered Medications   Medication Dose Route Frequency    acetaminophen (TYLENOL) tablet 325 mg  325 mg Oral Q6H PRN    acetaminophen (TYLENOL) tablet 650 mg  650 mg Oral Q4H PRN    acetaminophen (TYLENOL) tablet 975 mg  975 mg Oral Q6H PRN    aluminum-magnesium hydroxide-simethicone (MYLANTA) 200-200-20 mg/5 mL oral suspension 30 mL  30 mL Oral Q4H PRN    ARIPiprazole (ABILIFY) tablet 2 mg  2 mg Oral HS    benztropine (COGENTIN) injection 1 mg  1 mg Intramuscular Q6H PRN    benztropine (COGENTIN) tablet 1 mg  1 mg Oral Q6H PRN    DULoxetine (CYMBALTA) delayed release capsule 60 mg  60 mg Oral Daily    gabapentin (NEURONTIN) capsule 600 mg  600 mg Oral TID    haloperidol (HALDOL) tablet 5 mg  5 mg Oral Q6H PRN    haloperidol lactate (HALDOL) injection 5 mg  5 mg Intramuscular Q6H PRN    hydrOXYzine HCL (ATARAX) tablet 25 mg  25 mg Oral Q6H PRN    hydrOXYzine HCL (ATARAX) tablet 50 mg  50 mg Oral Q6H PRN    lithium carbonate (LITHOBID) CR tablet 450 mg  450 mg Oral Q12H [address]    LORazepam (ATIVAN) 2 mg/mL injection 2 mg  2 mg Intramuscular Q6H PRN    LORazepam (ATIVAN) tablet 1 mg  1 mg Oral Q6H PRN    magnesium hydroxide (MILK OF MAGNESIA) 400 mg/5 mL oral suspension 30 mL  30 mL Oral Daily PRN    nicotine polacrilex (NICORETTE) gum 2 mg  2 mg Oral Q2H PRN    OLANZapine (ZyPREXA) IM injection 10 mg  10 mg Intramuscular Q8H PRN    OLANZapine (ZyPREXA) tablet 10 mg  10 mg Oral Q8H PRN    prazosin (MINIPRESS) capsule 2 mg  2 mg Oral HS    risperiDONE (RisperDAL M-TABS) dispersible tablet 1 mg  1 mg Oral Q3H PRN    traZODone (DESYREL) tablet 100 mg  100 mg Oral HS    traZODone (DESYREL) tablet 50 mg  50 mg Oral HS PRN     Labs: reviewed    Counseling / Coordination of Care  Total floor / unit time spent today n/a  minutes  Greater than 50% of total time was spent with the patient and / or family counseling and / or coordination of care   A description of the counseling / coordination of care:

## 2019-09-10 NOTE — PROGRESS NOTES
Pt reports feeling "cranky" today  Contracts for safety  She plans to make calls tomorrow morning to recovery houses  Visible on unit  Social with peers  Her main concern is to secure housing  Pt needs encouragement to complete tasks

## 2019-09-10 NOTE — PLAN OF CARE
Problem: Ineffective Coping  Goal: Participates in unit activities  Description  Interventions:  - Provide therapeutic environment   - Provide required programming   - Redirect inappropriate behaviors   Outcome: Progressing     Problem: Depression  Goal: Verbalize thoughts and feelings  Description  Interventions:  - Assess and re-assess patient's level of risk   - Engage patient in 1:1 interactions, daily, for a minimum of 15 minutes   - Encourage patient to express feelings, fears, frustrations, hopes   Outcome: Progressing     Problem: Anxiety  Goal: Anxiety is at manageable level  Description  Interventions:  - Assess and monitor patient's anxiety level  - Monitor for signs and symptoms (heart palpitations, chest pain, shortness of breath, headaches, nausea, feeling jumpy, restlessness, irritable, apprehensive)  - Collaborate with interdisciplinary team and initiate plan and interventions as ordered    - Alma patient to unit/surroundings  - Explain treatment plan  - Encourage participation in care  - Encourage verbalization of concerns/fears  - Identify coping mechanisms  - Assist in developing anxiety-reducing skills  - Administer/offer alternative therapies  - Limit or eliminate stimulants  Outcome: Progressing     Problem: SELF HARM/SUICIDALITY  Goal: Will have no self-injury during hospital stay  Description  INTERVENTIONS:  - Q 15 MINUTES: Routine safety checks  - Q WAKING SHIFT & PRN: Assess risk to determine if routine checks are adequate to maintain patient safety  - Encourage patient to participate actively in care by formulating a plan to combat response to suicidal ideation, identify supports and resources  Outcome: Progressing     Problem: ANXIETY  Goal: Will report anxiety at manageable levels  Description  INTERVENTIONS:  - Administer medication as ordered  - Teach and encourage coping skills  - Provide emotional support  - Assess patient/family for anxiety and ability to cope  Outcome: Progressing  Goal: By discharge: Patient will verbalize 2 strategies to deal with anxiety  Description  Interventions:  - Identify any obvious source/trigger to anxiety  - Staff will assist patient in applying identified coping technique/skills  - Encourage attendance of scheduled groups and activities  Outcome: Progressing

## 2019-09-10 NOTE — PROGRESS NOTES
Pt provided with contact info for recovery houses in [facility] and [facility] per patient request  Pt also given phone number to call to change her insurance coverage from [employer]  She called her mom at work but her mom was not there  She will try again tomorrow  She also called a friend who is in-patient at [address] [place] [address] to inquire about living with him/her

## 2019-09-11 PROCEDURE — 99232 SBSQ HOSP IP/OBS MODERATE 35: CPT | Performed by: PHYSICIAN ASSISTANT

## 2019-09-11 RX ORDER — ARIPIPRAZOLE 5 MG/1
5 TABLET ORAL
Status: DISCONTINUED | OUTPATIENT
Start: 2019-09-11 | End: 2019-09-13 | Stop reason: HOSPADM

## 2019-09-11 RX ADMIN — PRAZOSIN HYDROCHLORIDE 2 MG: 1 CAPSULE ORAL at 22:36

## 2019-09-11 RX ADMIN — GABAPENTIN 600 MG: 300 CAPSULE ORAL at 22:37

## 2019-09-11 RX ADMIN — LITHIUM CARBONATE 450 MG: 450 TABLET, EXTENDED RELEASE ORAL at 22:36

## 2019-09-11 RX ADMIN — GABAPENTIN 600 MG: 300 CAPSULE ORAL at 09:21

## 2019-09-11 RX ADMIN — TRAZODONE HYDROCHLORIDE 100 MG: 100 TABLET ORAL at 22:36

## 2019-09-11 RX ADMIN — DULOXETINE 60 MG: 30 CAPSULE, DELAYED RELEASE ORAL at 09:21

## 2019-09-11 RX ADMIN — ARIPIPRAZOLE 5 MG: 5 TABLET ORAL at 22:36

## 2019-09-11 RX ADMIN — GABAPENTIN 600 MG: 300 CAPSULE ORAL at 16:06

## 2019-09-11 RX ADMIN — LITHIUM CARBONATE 450 MG: 450 TABLET, EXTENDED RELEASE ORAL at 09:21

## 2019-09-11 NOTE — PROGRESS NOTES
Progress Note - Behavioral Health     Mayra Shabazz 38 y o  female MRN: [id number]  Unit/Bed#: Lauren 258-02 Encounter: [id number]    Mayra Shabazz was seen for continuing care and reviewed with treatment team   Pt was labile and intermittently tearful, but coopertive for interview  She reported "I woke up with immediate depression and suicidal thoughts  But I just did what I needed to do and I'm OK "  She feels her medications "For the most part seem to be helping" but she is still struggling with her depression and has SI but no specific plan, though she thinks about cutting herself, but does not have intent  She is overwhelmed by anxious, angry and frustrated that her  Randolph Roa is not trying hard enough to explore options for outpatient therapy services for her  She perseverates on having psychotherapy in the community  She does not want a Recovery House and has not made phone calls to places suggested by the unit  because she claims to not being given any information  She reports better sleep for the first time last night  Pt presently denies suicidal intent and verbally contracts for safety, denies HI, or psychotic Sxs  She reports struggling with ETOH at times but she presently denies ETOH or drug cravings  Floor team relayed Pt has been medication compliant, but irritable and verbally abusive of staff at times, especially when being redirected from hypersexual behaviors--ie inappropriately revealing clothing, violating physical boundaries/touching another male patient on unit  She has not been physically aggressive  She has been attending groups and is generally social with peers       Sleep:Good   Appetite: Good   Medication side effects: None per Pt    ROS: No complaints per Pt    Labs/EKG: Reviewed    Mental Status Evaluation:  Appearance:  Dresssed, clean, fair eye contact, fair hygiene   Behavior:  Cooperative with interview, but dramatic in expression, a bit vague and guarded when asked pointed questions about what her own intentions are about certain things   Speech:  Clear, normal rate and volume   Mood:  Labile--presently depressed, anxious, and earlier today was  irritable with staff   Affect:  Labile, presently tearful   Thought Process:  Organized, Goal directed, Circumstantial, Perseverative on getting a therapist and "Healing", but blaming of others, and unwilling to take personal responsibility for her life circumstances and her role in her own care   Associations: Intact associations   Thought Content:  No verbalized delusions   Perceptual Disturbances: Pt denies any hallucinations or paranoia and does not appear to be responding to internal stimuli   Risk Potential: Pt having SI but no intent and denies HI at present    Sensorium:  Self, Place, Day of the week, Month, Year   Memory:  short term memory grossly intact   Consciousness:  alert, awake   Attention: Good   Insight:  Limited   Judgment: Limited   Gait/Station: Normal gait/station   Motor Activity: No abnormal movements     Vitals:    09/10/19 0727 09/10/19 1544 09/10/19 2100 09/11/19 0741   BP: 105/61 133/82 124/76 130/72   BP Location: Right arm Left arm Left arm Right arm   Pulse: 71 60 65 70   Resp: 16 17 17 17   Temp: (!) 97 1 °F (36 2 °C) 97 5 °F (36 4 °C)  97 9 °F (36 6 °C)   TempSrc: Tympanic Tympanic  Tympanic   SpO2:    97%   Weight:       Height:           Admission on 09/04/2019   Component Date Value    WBC 09/05/2019 10 64*    RBC 09/05/2019 4 37     Hemoglobin 09/05/2019 13 4     Hematocrit 09/05/2019 41 1     MCV 09/05/2019 94     MCH 09/05/2019 30 7     MCHC 09/05/2019 32 6     RDW 09/05/2019 14 0     MPV 09/05/2019 8 9     Platelets 37/37/2673 495*    nRBC 09/05/2019 0     Neutrophils Relative 09/05/2019 46     Immat GRANS % 09/05/2019 0     Lymphocytes Relative 09/05/2019 41     Monocytes Relative 09/05/2019 6     Eosinophils Relative 09/05/2019 6     Basophils Relative 09/05/2019 1     Neutrophils Absolute 09/05/2019 4 83     Immature Grans Absolute 09/05/2019 0 03     Lymphocytes Absolute 09/05/2019 4 41     Monocytes Absolute 09/05/2019 0 65     Eosinophils Absolute 09/05/2019 0 65*    Basophils Absolute 09/05/2019 0 07     Preg, Serum 09/05/2019 Negative     Cholesterol 09/05/2019 106     Triglycerides 09/05/2019 223*    HDL, Direct 09/05/2019 19*    LDL Calculated 09/05/2019 42     Non-HDL-Chol (CHOL-HDL) 09/05/2019 87     RPR 09/05/2019 Non-Reactive     Hemoglobin A1C 09/05/2019 5 0     EAG 09/05/2019 97     Lithium Lvl 09/09/2019 1 0     Sodium 09/09/2019 139     Potassium 09/09/2019 4 2     Chloride 09/09/2019 106     CO2 09/09/2019 28     ANION GAP 09/09/2019 5     BUN 09/09/2019 18     Creatinine 09/09/2019 0 77     Glucose 09/09/2019 101     Calcium 09/09/2019 9 1     AST 09/09/2019 23     ALT 09/09/2019 53     Alkaline Phosphatase 09/09/2019 76     Total Protein 09/09/2019 6 7     Albumin 09/09/2019 3 1*    Total Bilirubin 09/09/2019 0 50     eGFR 09/09/2019 95        Progress Toward Goals: Based on today's interview and review of prior notes, Pt is not progressing and she accepts an increase in Aripiprazole to 5mg qhs  Continue other medications unchanged  Lithium level is at top level of therapeutic range  Left msg on 's voice mail that Pt is now willing to sign a release for her ICM and is willing to consider going to [facility] for services, but would prefer [facility] and [employer]  Assessment/Plan   Principal Problem:    Bipolar II disorder (HCC)  Active Problems:    HEAVEN (generalized anxiety disorder)      Recommended Treatment: Continue with pharmacotherapy, group therapy, milieu therapy and occupational therapy    The patient will be maintained on the following medications:    Current Facility-Administered Medications:  acetaminophen 325 mg Oral Q6H PRN Cheryle Hsu, PA-C   acetaminophen 650 mg Oral Q4H PRN Viona Prom, PA-C   acetaminophen 975 mg Oral Q6H PRN Viona Prom, PA-C   aluminum-magnesium hydroxide-simethicone 30 mL Oral Q4H PRN Viona Prom, PA-C   ARIPiprazole 5 mg Oral HS Mariela Lymannte, PA-C   benztropine 1 mg Intramuscular Q6H PRN Viona Prom, PA-C   benztropine 1 mg Oral Q6H PRN Viona Prom, PA-C   DULoxetine 60 mg Oral Daily Viona Prom, PA-C   gabapentin 600 mg Oral TID [facility]   haloperidol 5 mg Oral Q6H PRN Viona Prom, PA-C   haloperidol lactate 5 mg Intramuscular Q6H PRN Viona Prom, PA-C   hydrOXYzine HCL 25 mg Oral Q6H PRN Viona Prom, PA-C   hydrOXYzine HCL 50 mg Oral Q6H PRN Viona Prom, PA-C   lithium carbonate 450 mg Oral Q12H [facility] Viona Prom, PA-C   LORazepam 2 mg Intramuscular Q6H PRN Viona Prom, PA-C   LORazepam 1 mg Oral Q6H PRN Viona Prom, PA-C   magnesium hydroxide 30 mL Oral Daily PRN Viona Prom, PA-C   nicotine polacrilex 2 mg Oral Q2H PRN Viona Prom, PA-C   OLANZapine 10 mg Intramuscular Q8H PRN Viona Prom, PA-C   OLANZapine 10 mg Oral Q8H PRN Viona Prom, PA-C   prazosin 2 mg Oral HS Paola Dubois MD   risperiDONE 1 mg Oral Q3H PRN Viona Prom, PA-C   traZODone 100 mg Oral HS [facility]   traZODone 50 mg Oral HS PRN Viona Prom, PA-C       Risks, benefits and possible side effects of Medications:   Risks, benefits, and possible side effects of medications explained to patient and patient verbalizes understanding and Risks of medications explained if female patient   Patient verbalizes understanding and agrees to notify her doctor if she becomes pregnant

## 2019-09-11 NOTE — PLAN OF CARE
Problem: Ineffective Coping  Goal: Participates in unit activities  Description  Interventions:  - Provide therapeutic environment   - Provide required programming   - Redirect inappropriate behaviors   Outcome: Progressing     Problem: Depression  Goal: Verbalize thoughts and feelings  Description  Interventions:  - Assess and re-assess patient's level of risk   - Engage patient in 1:1 interactions, daily, for a minimum of 15 minutes   - Encourage patient to express feelings, fears, frustrations, hopes   Outcome: Progressing     Problem: Anxiety  Goal: Anxiety is at manageable level  Description  Interventions:  - Assess and monitor patient's anxiety level  - Monitor for signs and symptoms (heart palpitations, chest pain, shortness of breath, headaches, nausea, feeling jumpy, restlessness, irritable, apprehensive)  - Collaborate with interdisciplinary team and initiate plan and interventions as ordered    - Benedict patient to unit/surroundings  - Explain treatment plan  - Encourage participation in care  - Encourage verbalization of concerns/fears  - Identify coping mechanisms  - Assist in developing anxiety-reducing skills  - Administer/offer alternative therapies  - Limit or eliminate stimulants  Outcome: Progressing     Problem: SELF HARM/SUICIDALITY  Goal: Will have no self-injury during hospital stay  Description  INTERVENTIONS:  - Q 15 MINUTES: Routine safety checks  - Q WAKING SHIFT & PRN: Assess risk to determine if routine checks are adequate to maintain patient safety  - Encourage patient to participate actively in care by formulating a plan to combat response to suicidal ideation, identify supports and resources  Outcome: Progressing     Problem: ANXIETY  Goal: Will report anxiety at manageable levels  Description  INTERVENTIONS:  - Administer medication as ordered  - Teach and encourage coping skills  - Provide emotional support  - Assess patient/family for anxiety and ability to cope  Outcome: Progressing  Goal: By discharge: Patient will verbalize 2 strategies to deal with anxiety  Description  Interventions:  - Identify any obvious source/trigger to anxiety  - Staff will assist patient in applying identified coping technique/skills  - Encourage attendance of scheduled groups and activities  Outcome: Progressing

## 2019-09-11 NOTE — PROGRESS NOTES
Pt irritable with redirection this AM  Redirected for cursing and wearing revealing clothing  Needs redirection for appropriate boundaries with male peer  Pt reports struggling with SI and depression  Contracts for safety on the unit  Reports that abilify was added 2 nights ago and she has done well on this medication in the past  Reports feeling less irritable after "having some laughs" with peers on the unit  Pt said she is calling some recovery houses and friends today to try to secure housing after discharge  Visible in milieu, social with peers

## 2019-09-11 NOTE — PROGRESS NOTES
09/11/19 0746   Team Meeting   Meeting Type Daily Rounds   Team Members Present   Team Members Present Physician;Nurse;;Occupational Therapist   Physician Team Member Dr Rohan Thurman Team Member MAXIMILIAN [facility] Management Team Member Tracie   OT Team Member Leidy   Patient/Family Present   Patient Present No   Patient's Family Present No     Irritable with staff  Pt did not want to make any phone calls yesterday  Pt does not want to call recovery [facility]  She thinks that her dispensation planning is the sole responsibility of the   Pt is not interested in going to rehab and she does not want to call her mother for support even though she lives locally  Pt has services including outpatient and ICM services in BEHAVIORAL MEDICINE AT [facility] but she is not interested in returning their

## 2019-09-12 RX ADMIN — LORAZEPAM 1 MG: 1 TABLET ORAL at 11:28

## 2019-09-12 RX ADMIN — GABAPENTIN 600 MG: 300 CAPSULE ORAL at 09:38

## 2019-09-12 RX ADMIN — PRAZOSIN HYDROCHLORIDE 2 MG: 1 CAPSULE ORAL at 22:06

## 2019-09-12 RX ADMIN — HYDROXYZINE HYDROCHLORIDE 50 MG: 50 TABLET, FILM COATED ORAL at 15:43

## 2019-09-12 RX ADMIN — GABAPENTIN 600 MG: 300 CAPSULE ORAL at 15:41

## 2019-09-12 RX ADMIN — GABAPENTIN 600 MG: 300 CAPSULE ORAL at 22:07

## 2019-09-12 RX ADMIN — HALOPERIDOL 5 MG: 5 TABLET ORAL at 18:56

## 2019-09-12 RX ADMIN — TRAZODONE HYDROCHLORIDE 100 MG: 100 TABLET ORAL at 22:28

## 2019-09-12 RX ADMIN — DULOXETINE 60 MG: 30 CAPSULE, DELAYED RELEASE ORAL at 09:37

## 2019-09-12 RX ADMIN — LITHIUM CARBONATE 450 MG: 450 TABLET, EXTENDED RELEASE ORAL at 22:06

## 2019-09-12 RX ADMIN — LITHIUM CARBONATE 450 MG: 450 TABLET, EXTENDED RELEASE ORAL at 09:37

## 2019-09-12 RX ADMIN — ARIPIPRAZOLE 5 MG: 5 TABLET ORAL at 22:28

## 2019-09-12 NOTE — PROGRESS NOTES
Followed up with patient reg  phone numbers provided by writer two days ago  Pt is negative in conversation, focused on potential barriers than finding solutions  When asked if she called the insurance change line she replied "no "  Says she cannot get into a recovery house because it costs money to get into them but denies making phone calls  Advised patient to call the two recovery house phone numbers in BEHAVIORAL MEDICINE AT [facility] to find out the cost to move in since her insurance in through BEHAVIORAL MEDICINE AT [facility]

## 2019-09-12 NOTE — PROGRESS NOTES
Progress Note - Behavioral Health   Sherry Newby 38 y o  female MRN: [id number]  Unit/Bed#: U 258-02 Encounter: [id number]    Assessment/Plan   Principal Problem:    Bipolar II disorder ([address] )  Active Problems:    HEAVEN (generalized anxiety disorder)      Subjective:  Patient today entered office cooperative and calm  Stated that she did not have suicidal thoughts today  Also went on to say she had improved sleep last night  Woke up feeling refreshed with more energy  Seen on unit observed to be smiling, social, and happy when with peers  When speaking with staff patient reports that her mood is not good  Also reports from staff that patient is staff splitting, blaming  on current situation, claims she is not being helped, and being troublesome when someone gives her limit setting  Had appropriate conversation with interviewer today until being discussed about the possibility of upcoming discharge  Then patient became tearful, angry, and yelled  She terminated interview early  States she wants long-term inpatient to help with her PTSD  Explained that she will require extensive outpatient therapy 1:1 with therapist or psychologist   Patient was focused on not leaving hospital and appears to have secondary gain to remain in hospital   Does have issue with homelessness  Was given many options by case management for potential disposition plans  According to notes patient has not been utilizing time properly and not looking into additional places for placement  Was given options today of homeless shelter or going to inpatient rehab for alcohol abuse  Focused on not going to rehab because she does not think that her alcohol is her main issue  Does not endorse criteria for amaris  Denies psychotic symptoms  Denied delusional material   Lithium level is 1 0 and deemed therapeutic  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  Medication compliant    Appears to be tolerating medications well without serious side effects  Initiate discharge planning  Current Medications:  Current Facility-Administered Medications   Medication Dose Route Frequency    acetaminophen (TYLENOL) tablet 325 mg  325 mg Oral Q6H PRN    acetaminophen (TYLENOL) tablet 650 mg  650 mg Oral Q4H PRN    acetaminophen (TYLENOL) tablet 975 mg  975 mg Oral Q6H PRN    aluminum-magnesium hydroxide-simethicone (MYLANTA) 200-200-20 mg/5 mL oral suspension 30 mL  30 mL Oral Q4H PRN    ARIPiprazole (ABILIFY) tablet 5 mg  5 mg Oral HS    benztropine (COGENTIN) injection 1 mg  1 mg Intramuscular Q6H PRN    benztropine (COGENTIN) tablet 1 mg  1 mg Oral Q6H PRN    DULoxetine (CYMBALTA) delayed release capsule 60 mg  60 mg Oral Daily    gabapentin (NEURONTIN) capsule 600 mg  600 mg Oral TID    haloperidol (HALDOL) tablet 5 mg  5 mg Oral Q6H PRN    haloperidol lactate (HALDOL) injection 5 mg  5 mg Intramuscular Q6H PRN    hydrOXYzine HCL (ATARAX) tablet 25 mg  25 mg Oral Q6H PRN    hydrOXYzine HCL (ATARAX) tablet 50 mg  50 mg Oral Q6H PRN    hydrOXYzine HCL (ATARAX) tablet 75 mg  75 mg Oral Q6H PRN    lithium carbonate (LITHOBID) CR tablet 450 mg  450 mg Oral Q12H KINA    LORazepam (ATIVAN) 2 mg/mL injection 2 mg  2 mg Intramuscular Q6H PRN    magnesium hydroxide (MILK OF MAGNESIA) 400 mg/5 mL oral suspension 30 mL  30 mL Oral Daily PRN    nicotine polacrilex (NICORETTE) gum 2 mg  2 mg Oral Q2H PRN    OLANZapine (ZyPREXA) IM injection 10 mg  10 mg Intramuscular Q8H PRN    OLANZapine (ZyPREXA) tablet 10 mg  10 mg Oral Q8H PRN    prazosin (MINIPRESS) capsule 2 mg  2 mg Oral HS    risperiDONE (RisperDAL M-TABS) dispersible tablet 1 mg  1 mg Oral Q3H PRN    traZODone (DESYREL) tablet 100 mg  100 mg Oral HS    traZODone (DESYREL) tablet 50 mg  50 mg Oral HS PRN       Behavioral Health Medications: all current active meds have been reviewed and continue current psychiatric medications      Vitals:  Vitals:    09/12/19 0813   BP: 117/67   Pulse: 57   Resp: 16   Temp: 97 7 °F (36 5 °C)   SpO2:        Laboratory results:    I have personally reviewed all pertinent laboratory/tests results  Most Recent Labs:   Lab Results   Component Value Date    WBC 10 64 (H) 09/05/2019    RBC 4 37 09/05/2019    HGB 13 4 09/05/2019    HCT 41 1 09/05/2019     (H) 09/05/2019    RDW 14 0 09/05/2019    NEUTROABS 4 83 09/05/2019    SODIUM 139 09/09/2019    K 4 2 09/09/2019     09/09/2019    CO2 28 09/09/2019    BUN 18 09/09/2019    CREATININE 0 77 09/09/2019    GLUC 101 09/09/2019    CALCIUM 9 1 09/09/2019    AST 23 09/09/2019    ALT 53 09/09/2019    ALKPHOS 76 09/09/2019    TP 6 7 09/09/2019    ALB 3 1 (L) 09/09/2019    TBILI 0 50 09/09/2019    CHOLESTEROL 106 09/05/2019    HDL 19 (L) 09/05/2019    TRIG 223 (H) 09/05/2019    LDLCALC 42 09/05/2019    NONHDLC 87 09/05/2019    LITHIUM 1 0 09/09/2019    QLL7ASGIOBTI 3 230 02/19/2019    PREGSERUM Negative 09/05/2019    RPR Non-Reactive 09/05/2019    HGBA1C 5 0 09/05/2019    EAG 97 09/05/2019       Psychiatric Review of Systems:  Behavior over the last 24 hours:  improved  Sleep: improved  Appetite: normal  Medication side effects: No  ROS: no complaints    Mental Status Evaluation:  Appearance:  casually dressed   Behavior:  staff splitting   Speech:  normal pitch and normal volume, then loud   Mood:  anxious and depressed   Affect:  mood-incongruent   Language appropriate   Thought Process:  goal directed and linear   Thought Content:  obsessions   Perceptual Disturbances: None   Risk Potential: Denied SI/HI    Potential for aggression: no   Sensorium:  person, place and time/date   Cognition:  grossly intact   Consciousness:  alert and awake    Recent and Remote Memory fair   Attention: attention span appeared shorter than expected for age   Insight:  partial   Judgment: partial   Gait/Station: normal gait/station and normal balance   Motor Activity: no abnormal movements     Progress Toward Goals: progressing    Recommended Treatment: Continue with group therapy, milieu therapy and occupational therapy  1   Continue current medications  2  Disposition planning with tentative discharge tomorrow     Risks, benefits and possible side effects of Medications:   Risks, benefits, and possible side effects of medications explained to patient and patient verbalizes understanding        Taylor Kaminski PA-C

## 2019-09-12 NOTE — CASE MANAGEMENT
CM met with pt to further encourage her to call recovery houses  Pt still upset that this writer is unable to refer her to a long term therapeutic residential placement  CM advised pt that treatment looks to the lowest level of care  CM advised pt that she could be referred to a PHP at time of dc  Pt then became very upset and discussed the fact that her main problem is homelessness  CM advised pt that because she has been struggling with alcohol use, it may be in her best interest to go to rehab and then be stepped down to a [facility] house  At this point pt walked away from the conversation stating that she can't cope  Pt is not willing to return to BEHAVIORAL MEDICINE AT [facility] where her services and insurance is  Pt does not want to go to rehab  CM advised pt that pt would have to call recovery houses in the area in which she wants to live and advised pt that a better track would be for pt to go to rehab, [facility] house and recovery house, in that order

## 2019-09-12 NOTE — PROGRESS NOTES
09/12/19 0740   Team Meeting   Meeting Type Daily Rounds   Team Members Present   Team Members Present Physician;Nurse;;Occupational Therapist   Physician Team Member Dr Gem Connolly Team Member [facility] Management Team Member Jose Rafael/ [address]   OT Team Member Leidy   Patient/Family Present   Patient Present No   Patient's Family Present No     Pt states she can contract for safety  201  Needing frequent redirection  Irritable  Not cooperating with planning  No med changes

## 2019-09-12 NOTE — PROGRESS NOTES
Med note: Pt requested medication for anxiety at 1543  PRN atarax-50mg provided Colin Soler anxiety 18)  Pt reported improved anxiety, but became agitated during a phone call with her mother  PRN haldol provided at [facility] [facility])

## 2019-09-12 NOTE — CASE MANAGEMENT
Pt is still not agreeable to rehab and subsequent planning  Pt adamant that she wants to go to a long term residential placement for people who have experienced trauma  CM provided pt with telephone number to call her insurance to discuss possible referrals  Pt reports that she will call this afternoon  Pt still not agreeable to go back to BEHAVIORAL MEDICINE AT [facility]  Pt states she will continue to try and call friend and family which she has started to do today with the likelihood of dc tomorrow  CM will continue to follow

## 2019-09-12 NOTE — PROGRESS NOTES
Pt was heard cursing and raising her voice in PA's office  She came out of office tearful, stating "they're not helping me  I need a med adjustment because I have daily thoughts of wanting to kill myself " "They tell me I'm acting one way towards staff and another way towards my peers " Pt received Ativan for increase in anxiety with good effect  She reported passive death wish earlier this AM, but, denied S/I or plan  She has contracted for safety  Pt has been cooperative with meds and attends groups  Will continue to monitor, provide support and encouragement

## 2019-09-12 NOTE — PROGRESS NOTES
Pt social with peers  Visible on unit throughout shift  Attended group  Appears comfortable  Pt close with male peer, talking frequently in hallway

## 2019-09-12 NOTE — PROGRESS NOTES
Pt making negative, blaming comments  Reports anxiety and perseverating on others helping her with discharge placement  Accepted atarax prn  Socializing with peers, appeared brighter  Able to reach mother by phone, pt appeared to be having difficult conversation, tearful and agitated by mother's comments  Offered and accepted haldol prn for agitation  Denies current SI, currently contracts for safety  Pt sitting quietly in room, then asleep

## 2019-09-12 NOTE — PLAN OF CARE
Problem: Ineffective Coping  Goal: Participates in unit activities  Description  Interventions:  - Provide therapeutic environment   - Provide required programming   - Redirect inappropriate behaviors   Outcome: Progressing     Problem: Depression  Goal: Verbalize thoughts and feelings  Description  Interventions:  - Assess and re-assess patient's level of risk   - Engage patient in 1:1 interactions, daily, for a minimum of 15 minutes   - Encourage patient to express feelings, fears, frustrations, hopes   Outcome: Progressing     Problem: Anxiety  Goal: Anxiety is at manageable level  Description  Interventions:  - Assess and monitor patient's anxiety level  - Monitor for signs and symptoms (heart palpitations, chest pain, shortness of breath, headaches, nausea, feeling jumpy, restlessness, irritable, apprehensive)  - Collaborate with interdisciplinary team and initiate plan and interventions as ordered    - [place] patient to unit/surroundings  - Explain treatment plan  - Encourage participation in care  - Encourage verbalization of concerns/fears  - Identify coping mechanisms  - Assist in developing anxiety-reducing skills  - Administer/offer alternative therapies  - Limit or eliminate stimulants  Outcome: Progressing     Problem: SELF HARM/SUICIDALITY  Goal: Will have no self-injury during hospital stay  Description  INTERVENTIONS:  - Q 15 MINUTES: Routine safety checks  - Q WAKING SHIFT & PRN: Assess risk to determine if routine checks are adequate to maintain patient safety  - Encourage patient to participate actively in care by formulating a plan to combat response to suicidal ideation, identify supports and resources  Outcome: Progressing

## 2019-09-13 VITALS
HEART RATE: 75 BPM | TEMPERATURE: 95.8 F | OXYGEN SATURATION: 97 % | BODY MASS INDEX: 34.57 KG/M2 | RESPIRATION RATE: 15 BRPM | HEIGHT: 62 IN | SYSTOLIC BLOOD PRESSURE: 111 MMHG | WEIGHT: 187.83 LBS | DIASTOLIC BLOOD PRESSURE: 60 MMHG

## 2019-09-13 RX ORDER — LITHIUM CARBONATE 450 MG
450 TABLET, EXTENDED RELEASE ORAL EVERY 12 HOURS SCHEDULED
Qty: 14 TABLET | Refills: 7 | Status: SHIPPED | OUTPATIENT
Start: 2019-09-13

## 2019-09-13 RX ORDER — TRAZODONE HYDROCHLORIDE 100 MG/1
100 TABLET ORAL
Qty: 7 TABLET | Refills: 7 | Status: SHIPPED | OUTPATIENT
Start: 2019-09-13

## 2019-09-13 RX ORDER — PRAZOSIN HYDROCHLORIDE 2 MG/1
2 CAPSULE ORAL
Qty: 7 CAPSULE | Refills: 7 | Status: SHIPPED | OUTPATIENT
Start: 2019-09-13

## 2019-09-13 RX ORDER — DULOXETIN HYDROCHLORIDE 60 MG/1
60 CAPSULE, DELAYED RELEASE ORAL DAILY
Qty: 7 CAPSULE | Refills: 7 | Status: SHIPPED | OUTPATIENT
Start: 2019-09-13

## 2019-09-13 RX ORDER — NICOTINE 21 MG/24HR
1 PATCH, TRANSDERMAL 24 HOURS TRANSDERMAL DAILY
Qty: 28 PATCH | Refills: 0 | Status: SHIPPED | OUTPATIENT
Start: 2019-09-13

## 2019-09-13 RX ORDER — GABAPENTIN 300 MG/1
600 CAPSULE ORAL 3 TIMES DAILY
Qty: 42 CAPSULE | Refills: 7 | Status: SHIPPED | OUTPATIENT
Start: 2019-09-13

## 2019-09-13 RX ORDER — ARIPIPRAZOLE 5 MG/1
5 TABLET ORAL
Qty: 7 TABLET | Refills: 7 | Status: SHIPPED | OUTPATIENT
Start: 2019-09-13

## 2019-09-13 RX ADMIN — DULOXETINE 60 MG: 30 CAPSULE, DELAYED RELEASE ORAL at 09:06

## 2019-09-13 RX ADMIN — GABAPENTIN 600 MG: 300 CAPSULE ORAL at 09:06

## 2019-09-13 RX ADMIN — LITHIUM CARBONATE 450 MG: 450 TABLET, EXTENDED RELEASE ORAL at 09:06

## 2019-09-13 NOTE — DISCHARGE INSTR - APPOINTMENTS
You have been provided with the changeline number as well as the 211 number in case you want to switch your insurance to a different county or call to find a shelter

## 2019-09-13 NOTE — CASE MANAGEMENT
Pt states that she would like to be discharged to [address] [facility], [place], [address]  Pt would not indicate who lives there or who will be her primary support  Pt signed [employer] waiver form and will be discharged  No further needs anticipated at time of dc

## 2019-09-13 NOTE — DISCHARGE INSTR - OTHER ORDERS
[address];  Call: [phone]  [facility] Toll Free:  [phone]  [address] Warm Line - [phone]  [facility] Crisis Text Line: [phone]

## 2019-09-13 NOTE — BH TRANSITION RECORD
Contact Information: If you have any questions, concerns, pended studies, tests and/or procedures, or emergencies regarding your inpatient behavioral health visit  Please contact [place] behavioral [facility] unit [phone] and ask to speak to a , nurse or physician  A contact is available 24 hours/ 7 days a week at this number  Summary of Procedures Performed During your Stay:  Below is a list of major procedures performed during your hospital stay and a summary of results:  - No major procedures performed  Pending Studies (From admission, onward)    None        If studies are pending at discharge, follow up with your PCP and/or referring provider
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
## 2019-09-13 NOTE — DISCHARGE INSTRUCTIONS
Bipolar Disorder   WHAT YOU NEED TO KNOW:   Bipolar disorder is a long-term chemical imbalance that causes rapid changes in mood and behavior  High moods are called amaris  Low moods are called depression  Sometimes you will feel manic and sometimes you will feel depressed  You can have alternating episodes of amaris and depression  This is called a mixed bipolar state  DISCHARGE INSTRUCTIONS:   Call 911 if:   · You think about hurting yourself or someone else  Contact your healthcare provider or psychiatrist if:   · You are having trouble managing your bipolar disorder  · You cannot sleep, or are sleeping all the time  · You cannot eat, or are eating more than usual     · You feel dizzy or your stomach is upset  · You cannot make it to your next meeting  · You have questions or concerns about your condition or care  Medicines:   · Medicines  may be given to help keep your mood stable, or to help you sleep  Changes in medicine are often needed as your bipolar disorder changes  · Take your medicine as directed  Contact your healthcare provider if you think your medicine is not helping or if you have side effects  Tell him or her if you are allergic to any medicine  Keep a list of the medicines, vitamins, and herbs you take  Include the amounts, and when and why you take them  Bring the list or the pill bottles to follow-up visits  Carry your medicine list with you in case of an emergency  Follow up with your healthcare provider or psychiatrist as directed:  Write down your questions so you remember to ask them during your visits  Manage bipolar disorder:  Watch for triggers of bipolar disorder symptoms, such as stress  Learn new ways to relax, such as deep breathing, to manage your stress  Tell someone if you feel a manic or depressive period might be coming on  Ask a friend or family member to help watch you for bipolar symptoms   Work to develop skills that will help you manage bipolar disorder  You may need to make lifestyle changes  Ask your healthcare provider or psychiatrist for resources  For support and more information:   · [address] [facility]), [address] [facility], [address] [facility], [address]  [place] [address]   Phone: [phone]  Phone: [phone]  Web Address: Jessica tn  · Depression and [address] ([employer]BS[employer])  [address] [place] [address], [address] [place] [address] , [address]  Phone: [phone]  Web Address: [employer] no  org   © 2017 [facility] Information is for End User's use only and may not be sold, redistributed or otherwise used for commercial purposes  All illustrations and images included in CareNotes® are the copyrighted property of A D A M , Inc  or Raudel Oliveira  The above information is an  only  It is not intended as medical advice for individual conditions or treatments  Talk to your doctor, nurse or pharmacist before following any medical regimen to see if it is safe and effective for you  Anxiety   WHAT YOU SHOULD KNOW:   Anxiety is a condition that causes you to feel excessive worry, uneasiness, or fear  Family or work stress, smoking, caffeine, and alcohol can increase your risk for anxiety  Certain medicines or health conditions can also increase your risk  Anxiety may begin gradually, and can become a long-term condition if it is not managed or treated  AFTER YOU LEAVE:   Medicines:   · Medicines  can help you feel more calm and relaxed, and decrease your symptoms  · Take your medicine as directed  Contact your healthcare provider if you think your medicine is not helping or if you have side effects  Tell him if you are allergic to any medicine  Keep a list of the medicines, vitamins, and herbs you take  Include the amounts, and when and why you take them   Bring the list or the pill bottles to follow-up visits  Carry your medicine list with you in case of an emergency  Follow up with your healthcare provider within 2 weeks or as directed:  Write down your questions so you remember to ask them during your visits  Manage anxiety:   · Go to counseling as directed  Cognitive behavioral therapy can help you understand and change how you react to events that trigger your symptoms  · Find ways to manage your symptoms  Activities such as exercise, meditation, or listening to music can help you relax  · Practice deep breathing  Breathing can change how your body reacts to stress  Focus on taking slow, deep breaths several times a day, or during an anxiety attack  Breathe in through your nose, and out through your mouth  · Avoid caffeine  Caffeine can make your symptoms worse  Avoid foods or drinks that are meant to increase your energy level  · Limit or avoid alcohol  Ask your healthcare provider if alcohol is safe for you  You may not be able to drink alcohol if you take certain anxiety or depression medicines  Limit alcohol to 1 drink per day if you are a woman  Limit alcohol to 2 drinks per day if you are a man  A drink of alcohol is 12 ounces of beer, 5 ounces of wine, or 1½ ounces of liquor  Contact your healthcare provider if:   · Your symptoms get worse or do not get better with treatment  · You think your medicine may be causing side effects  · Your anxiety keeps you from doing your regular daily activities  · You have new symptoms since your last visit  · You have questions or concerns about your condition or care  Seek care immediately or call 911 if:   · You have chest pain, tightness, or heaviness that may spread to your shoulders, arms, jaw, neck, or back  · You feel like hurting yourself or someone else  · You feel dizzy, lightheaded, or faint    © 2014 0635 Karen Barbosa is for End User's use only and may not be sold, redistributed or otherwise used for commercial purposes  All illustrations and images included in CareNotes® are the copyrighted property of A D A M , Inc  or Raudel Oliveira  The above information is an  only  It is not intended as medical advice for individual conditions or treatments  Talk to your doctor, nurse or pharmacist before following any medical regimen to see if it is safe and effective for you

## 2019-09-13 NOTE — CASE MANAGEMENT
CM met with pt to discuss dispensation planning  Pt is still not realistic about dc planning  Pt is not interested in going to rehab and is declining to call 211  Pt is being very picky about what she would like to be referred to  CM offered rehab options which pt declined  Pt does state that alcohol has been the main reason why she has struggled in life  However, pt states that going to rehab is inconvenient and she has already been down that route before  Pt is moderately agreeable to go to the [place] [address] in [facility] but she prefers not to be in a place that is "rough and dangerous"  CM advised that all the homeless shelters are in inner city environments  CM also mentioned that pt is a resident of [facility] and she could utilize their homeless resources such as [employer]  Pt was offended at the thought that she would be provided with a tent to sleep in  Pt is not interested in going to her ICM's office due to not wanting to live in [facility]  This writer is unable to directly refer her to any outpatient providers because she will not cooperate with any of the dispensation planning that has been offered  Pt was referred to [facility] contracts with [facility] after her last admission to [facility]  CM added this information to her dc instructions so if she is willing to go there she can make an appointment  Pt did not want to sign SAMANTHA's for her [facility] and she did not want this writer to be in contact with her mother  Pt did eventually reach out to her mother yesterday for the first time in 9 days of inpatient care  Mother is not able to support her at time of dc  Pt has no other supports  At this point, the treatment team feels that there is nothing more that pt will gain from being in acute care  Her medications are therapeutic and she is psychiatrically stable and she is able to contract for safety  Her primary stressor is homelessness   Previous admissions indicate that she has made vigorous attempts to try to link up with other peers and live with them at time of dc  Pt has been unable to do the same during this admission and is still unwilling to cooperate with realistic dispensation planning  Pt is blaming this writer and stating that staff have not helped her during this admission  Pt remains tearful and is asking this writer why she can't get housing through [employer]  This writer responded by stating that she is not a resident of [facility] and that their services are targeted towards [employer] and that services take a long time to come to fruition  Pt is still angry that she is not being helped and is unrealistic about the time it takes to get any kind of homeless services  CM advised pt that if she is not going to call 211 she will be discharged to her [facility] in [place]  If pt is not willing to do this then she can be either discharged to her mother's residence or the [facility]  Pt is not willing to make a decision at this time

## 2019-09-13 NOTE — PROGRESS NOTES
Observed asleep in bed at the onset of the shift  Has remained asleep throughout the night, without restlessness or wakefulness

## 2019-09-13 NOTE — PROGRESS NOTES
09/13/19 0738   Team Meeting   Meeting Type Daily Rounds   Team Members Present   Team Members Present Physician;Nurse;;Occupational Therapist   Physician Team Member Dr Opal Noe Team Member Loretta Brady 63 Management Team Member Jose Rafael/ [address]   OT Team Member Leidy   Patient/Family Present   Patient Present No   Patient's Family Present No     Tearful  Dramatic  States she contracts for safety  No more medication changes  She reached out to her mother yesterday  Upset with dc planning

## 2025-05-02 NOTE — NURSING NOTE
AVS reviewed with patient  Pt verbalized understanding  Money and prescriptions returned  Pt picked up by Chrystal  general